# Patient Record
Sex: FEMALE | Race: BLACK OR AFRICAN AMERICAN | NOT HISPANIC OR LATINO | Employment: FULL TIME | URBAN - METROPOLITAN AREA
[De-identification: names, ages, dates, MRNs, and addresses within clinical notes are randomized per-mention and may not be internally consistent; named-entity substitution may affect disease eponyms.]

---

## 2022-10-06 ENCOUNTER — HOSPITAL ENCOUNTER (EMERGENCY)
Facility: HOSPITAL | Age: 52
Discharge: HOME | End: 2022-10-06
Attending: EMERGENCY MEDICINE
Payer: MEDICAID

## 2022-10-06 VITALS
BODY MASS INDEX: 21.71 KG/M2 | RESPIRATION RATE: 19 BRPM | WEIGHT: 115 LBS | DIASTOLIC BLOOD PRESSURE: 102 MMHG | TEMPERATURE: 98 F | HEIGHT: 61 IN | SYSTOLIC BLOOD PRESSURE: 190 MMHG | OXYGEN SATURATION: 100 % | HEART RATE: 64 BPM

## 2022-10-06 DIAGNOSIS — M54.9 BACK PAIN: ICD-10-CM

## 2022-10-06 PROCEDURE — 93010 ELECTROCARDIOGRAM REPORT: CPT | Mod: ,,, | Performed by: INTERNAL MEDICINE

## 2022-10-06 PROCEDURE — 63600175 PHARM REV CODE 636 W HCPCS: Performed by: EMERGENCY MEDICINE

## 2022-10-06 PROCEDURE — 93010 EKG 12-LEAD: ICD-10-PCS | Mod: ,,, | Performed by: INTERNAL MEDICINE

## 2022-10-06 PROCEDURE — 96372 THER/PROPH/DIAG INJ SC/IM: CPT | Performed by: EMERGENCY MEDICINE

## 2022-10-06 PROCEDURE — 99284 EMERGENCY DEPT VISIT MOD MDM: CPT | Mod: 25

## 2022-10-06 PROCEDURE — 25000003 PHARM REV CODE 250: Performed by: EMERGENCY MEDICINE

## 2022-10-06 PROCEDURE — 93005 ELECTROCARDIOGRAM TRACING: CPT

## 2022-10-06 RX ORDER — IBUPROFEN 600 MG/1
600 TABLET ORAL EVERY 6 HOURS PRN
Qty: 20 TABLET | Refills: 0 | OUTPATIENT
Start: 2022-10-06 | End: 2023-05-12

## 2022-10-06 RX ORDER — OXYCODONE AND ACETAMINOPHEN 7.5; 325 MG/1; MG/1
1 TABLET ORAL ONCE
Status: COMPLETED | OUTPATIENT
Start: 2022-10-06 | End: 2022-10-06

## 2022-10-06 RX ORDER — KETOROLAC TROMETHAMINE 30 MG/ML
30 INJECTION, SOLUTION INTRAMUSCULAR; INTRAVENOUS
Status: COMPLETED | OUTPATIENT
Start: 2022-10-06 | End: 2022-10-06

## 2022-10-06 RX ORDER — ASPIRIN/CAFFEINE 500-32.5MG
TABLET ORAL
COMMUNITY

## 2022-10-06 RX ORDER — ONDANSETRON 4 MG/1
4 TABLET, ORALLY DISINTEGRATING ORAL
Status: COMPLETED | OUTPATIENT
Start: 2022-10-06 | End: 2022-10-06

## 2022-10-06 RX ORDER — METHOCARBAMOL 750 MG/1
1500 TABLET, FILM COATED ORAL 3 TIMES DAILY
Qty: 30 TABLET | Refills: 0 | Status: SHIPPED | OUTPATIENT
Start: 2022-10-06 | End: 2022-10-11

## 2022-10-06 RX ORDER — LIDOCAINE 50 MG/G
1 PATCH TOPICAL ONCE
Status: COMPLETED | OUTPATIENT
Start: 2022-10-06 | End: 2022-10-06

## 2022-10-06 RX ADMIN — KETOROLAC TROMETHAMINE 30 MG: 30 INJECTION, SOLUTION INTRAMUSCULAR at 10:10

## 2022-10-06 RX ADMIN — LIDOCAINE 1 PATCH: 50 PATCH CUTANEOUS at 10:10

## 2022-10-06 RX ADMIN — ONDANSETRON 4 MG: 4 TABLET, ORALLY DISINTEGRATING ORAL at 10:10

## 2022-10-06 RX ADMIN — OXYCODONE AND ACETAMINOPHEN 1 TABLET: 7.5; 325 TABLET ORAL at 10:10

## 2022-10-06 NOTE — Clinical Note
"Rosette Bustamanteth"Israel was seen and treated in our emergency department on 10/6/2022.  She may return to work on 10/10/2022.       If you have any questions or concerns, please don't hesitate to call.      Blank Vallecillo RN RN    "

## 2022-10-06 NOTE — ED PROVIDER NOTES
"Encounter Date: 10/6/2022    SCRIBE #1 NOTE: I, Stephen Naik Jr, am scribing for, and in the presence of,  Artem Bautista MD. I have scribed the following portions of the note - Other sections scribed: HPI, ROS, PE, MDM.     History     Chief Complaint   Patient presents with    Back Pain     Back pain under left scapulae area that radiates down her left arm, +interminet tingling sensation. Patient noted pain started one week ago while at work lifting objects and noted a popping sesnation. +burning and stabbing pain in the back. No n/v, skin warm and dry. Hx of htn and did take bp medicaiton this am     Rosette Wood is a 51 y.o. female who has no past medical history on file.The patient presents to the emergency department due to back pain; onset one week. There are no associated symptoms.The patient reported developing left, upper-back pain that has worsened since onset; back pain described as "burning". Patient believes heavy lifting at work is the main reason for symptom development. She denies nausea and vomiting.  She has no chest pain or shortness of breath.  No fever or cough.  Patient has no known allergies to medication.     The history is provided by the patient. No  was used.   Review of patient's allergies indicates:  No Known Allergies  No past medical history on file.  No past surgical history on file.  No family history on file.     Review of Systems   Constitutional:  Negative for chills and fever.   Respiratory:  Negative for cough and shortness of breath.    Cardiovascular:  Negative for chest pain and leg swelling.   Gastrointestinal:  Negative for nausea and vomiting.   Musculoskeletal:  Positive for back pain.   Neurological:  Negative for dizziness.   All other systems reviewed and are negative.    Physical Exam     Initial Vitals [10/06/22 0914]   BP Pulse Resp Temp SpO2   (!) 201/104 90 20 98 °F (36.7 °C) 99 %      MAP       --         Physical Exam    Nursing " note and vitals reviewed.  Constitutional: No distress.   HENT:   Head: Normocephalic and atraumatic.   Eyes: Conjunctivae and EOM are normal.   Neck: Neck supple.   Normal range of motion.  Cardiovascular:  Normal rate, regular rhythm and normal heart sounds.           Pulmonary/Chest: Breath sounds normal.   Lungs clear.   Abdominal: Abdomen is soft. There is no abdominal tenderness.   Musculoskeletal:      Cervical back: Normal range of motion and neck supple.      Comments: Tenderness to the left, mid-thoracic paraspinal muscle that extends to the left scapula. Left upper-back pain exacerbated with movement of the left shoulder. No edema present.     Neurological: She is alert and oriented to person, place, and time.   Skin: Skin is warm and dry. No rash noted.   Psychiatric: Thought content normal.       ED Course   Procedures  Labs Reviewed - No data to display     ECG Results              EKG 12-lead (In process)  Result time 10/06/22 11:19:35      In process by Interface, Lab In [facility] (10/06/22 11:19:35)                   Narrative:    Test Reason : M54.9,    Vent. Rate : 088 BPM     Atrial Rate : 088 BPM     P-R Int : 144 ms          QRS Dur : 076 ms      QT Int : 360 ms       P-R-T Axes : 077 068 089 degrees     QTc Int : 435 ms    Normal sinus rhythm  Possible Left atrial enlargement  T wave abnormality, consider inferior ischemia  Abnormal ECG  No previous ECGs available    Referred By: [email or web address]   SELF           Confirmed By:                                   Imaging Results    None          Medications   LIDOcaine 5 % patch 1 patch (1 patch Transdermal Patch Applied 10/6/22 1009)   ketorolac injection 30 mg (30 mg Intramuscular Given 10/6/22 1009)   oxyCODONE-acetaminophen 7.5-325 mg per tablet 1 tablet (1 tablet Oral Given 10/6/22 1057)   ondansetron disintegrating tablet 4 mg (4 mg Oral Given 10/6/22 1057)     Medical Decision Making:   History:   Old Medical Records: I decided to obtain old  medical records.  Initial Assessment:   Rosette Wood is a 51 y.o. female who has no past medical history on file.The patient presents to the emergency department due to back pain; onset one week. There are no associated symptoms.    Differential Diagnosis:   Differential Diagnosis includes, but is not limited to:  Cauda equina syndrome, diskitis/osteomyelitis, epidural/paraspinal abscess, AAA, aortic dissection, post-op/hardware infection, trauma/vertebral fracture, spinal cord injury, disc herniation, spinal stenosis, sciatica, radiculopathy, neoplasm, lumbar muscle strain, muscle spasm, neuropathic pain, UTI/pyelonephritis, nephrolithiasis.   Clinical Tests:   Medical Tests: Ordered and Reviewed  ED Management:  Lidocaine patch was applied to the left upper back.  Patient was given an injection of Toradol as well as 1 Percocet.  Etiology of her pain seems to be muscular.  Patient reports a history of hypertension and did not take her medications this morning.  I have urged her to take these upon arrival home.  I will prescribe ibuprofen and Robaxin for her back pain.  She should follow up with her primary physician when able for recheck but also may return to the emergency department for any possible worsening.        Scribe Attestation:   Scribe #1: I performed the above scribed service and the documentation accurately describes the services I performed. I attest to the accuracy of the note.                 I, Dr. Artem Thakur, personally performed the services described in this documentation. All medical record entries made by the scribe were at my direction and in my presence. I have reviewed the chart and agree that the record reflects my personal performance and is accurate and complete. Artem Thakur MD.  6:34 AM 10/07/2022    Clinical Impression:   Final diagnoses:  [M54.9] Back pain        ED Disposition Condition    Discharge Stable          ED Prescriptions       Medication Sig Dispense Start Date  End Date Auth. Provider    ibuprofen (ADVIL,MOTRIN) 600 MG tablet Take 1 tablet (600 mg total) by mouth every 6 (six) hours as needed (pain). 20 tablet 10/6/2022 -- Artem Bautista MD    methocarbamoL (ROBAXIN) 750 MG Tab Take 2 tablets (1,500 mg total) by mouth 3 (three) times daily. for 5 days 30 tablet 10/6/2022 10/11/2022 Artem Bautista MD          Follow-up Information       Follow up With Specialties Details Why Contact Info    Your primary doctor   As needed     [place] - Emergency Dept Emergency Medicine  If symptoms worsen [address] [facility] [address] [phone]             Artem Bautista MD  10/07/22 0669

## 2022-10-06 NOTE — PHARMACY MED REC
"  [facility]           Pharmacy  Admission Medication History     The home medication history was taken by Unique Wolff.      Medication history obtained from Medications listed below were obtained from: Patient/family    Based on information gathered for medication list, you may go to "Admission" then "Reconcile Home Medications" tabs to review and/or act upon those items.     The home medication list has been updated by the Pharmacy department.   Please read ALL comments highlighted in yellow.   Please address this information as you see fit.    Feel free to contact us if you have any questions or require assistance.        No current facility-administered medications on file prior to encounter.     Current Outpatient Medications on File Prior to Encounter   Medication Sig Dispense Refill    aspirin-caffeine (DESTINEY BACK AND BODY) 500-32.5 mg Tab Take by mouth as needed.         Please address this information as you see fit.  Feel free to contact us if you have any questions or require assistance.    Unique Wolff  [phone]                .        "

## 2023-03-29 ENCOUNTER — HOSPITAL ENCOUNTER (EMERGENCY)
Facility: HOSPITAL | Age: 53
Discharge: HOME | End: 2023-03-29
Attending: EMERGENCY MEDICINE
Payer: MEDICAID

## 2023-03-29 VITALS
SYSTOLIC BLOOD PRESSURE: 174 MMHG | HEART RATE: 79 BPM | BODY MASS INDEX: 21.16 KG/M2 | RESPIRATION RATE: 18 BRPM | DIASTOLIC BLOOD PRESSURE: 84 MMHG | OXYGEN SATURATION: 100 % | WEIGHT: 112 LBS | TEMPERATURE: 98 F

## 2023-03-29 DIAGNOSIS — M25.551 RIGHT HIP PAIN: Primary | ICD-10-CM

## 2023-03-29 LAB
BILIRUB UR QL STRIP: NEGATIVE
CLARITY UR: CLEAR
COLOR UR: YELLOW
GLUCOSE UR QL STRIP: ABNORMAL
HGB UR QL STRIP: NEGATIVE
KETONES UR QL STRIP: NEGATIVE
LEUKOCYTE ESTERASE UR QL STRIP: NEGATIVE
NITRITE UR QL STRIP: NEGATIVE
PH UR STRIP: 7 [PH] (ref 5–8)
PROT UR QL STRIP: NEGATIVE
SP GR UR STRIP: 1.02 (ref 1–1.03)
URN SPEC COLLECT METH UR: ABNORMAL
UROBILINOGEN UR STRIP-ACNC: NEGATIVE EU/DL

## 2023-03-29 PROCEDURE — 96372 THER/PROPH/DIAG INJ SC/IM: CPT | Performed by: PHYSICIAN ASSISTANT

## 2023-03-29 PROCEDURE — 99284 EMERGENCY DEPT VISIT MOD MDM: CPT

## 2023-03-29 PROCEDURE — 63600175 PHARM REV CODE 636 W HCPCS: Performed by: PHYSICIAN ASSISTANT

## 2023-03-29 PROCEDURE — 81003 URINALYSIS AUTO W/O SCOPE: CPT | Performed by: PHYSICIAN ASSISTANT

## 2023-03-29 RX ORDER — LIDOCAINE 50 MG/G
1 PATCH TOPICAL DAILY
Qty: 10 PATCH | Refills: 0 | OUTPATIENT
Start: 2023-03-29 | End: 2023-12-11

## 2023-03-29 RX ORDER — KETOROLAC TROMETHAMINE 30 MG/ML
15 INJECTION, SOLUTION INTRAMUSCULAR; INTRAVENOUS
Status: COMPLETED | OUTPATIENT
Start: 2023-03-29 | End: 2023-03-29

## 2023-03-29 RX ORDER — NAPROXEN 500 MG/1
500 TABLET ORAL 2 TIMES DAILY WITH MEALS
Qty: 30 TABLET | Refills: 0 | OUTPATIENT
Start: 2023-03-29 | End: 2023-10-31

## 2023-03-29 RX ADMIN — KETOROLAC TROMETHAMINE 15 MG: 30 INJECTION, SOLUTION INTRAMUSCULAR; INTRAVENOUS at 09:03
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
## 2023-03-29 NOTE — DISCHARGE INSTRUCTIONS

## 2023-03-29 NOTE — Clinical Note
"Rosette Bustamanteth" Wood was seen and treated in our emergency department on 3/29/2023.  She may return to work on 03/30/2023.       If you have any questions or concerns, please don't hesitate to call.      Kieran Vernon PA-C"

## 2023-05-11 ENCOUNTER — HOSPITAL ENCOUNTER (EMERGENCY)
Facility: HOSPITAL | Age: 53
Discharge: HOME | End: 2023-05-12
Attending: STUDENT IN AN ORGANIZED HEALTH CARE EDUCATION/TRAINING PROGRAM
Payer: MEDICAID

## 2023-05-11 DIAGNOSIS — R07.9 CHEST PAIN: ICD-10-CM

## 2023-05-11 DIAGNOSIS — B34.9 VIRAL INFECTION: Primary | ICD-10-CM

## 2023-05-11 DIAGNOSIS — D64.9 ANEMIA, UNSPECIFIED TYPE: ICD-10-CM

## 2023-05-11 LAB
INFLUENZA A, MOLECULAR: NEGATIVE
INFLUENZA B, MOLECULAR: NEGATIVE
SARS-COV-2 RDRP RESP QL NAA+PROBE: NEGATIVE
SPECIMEN SOURCE: NORMAL

## 2023-05-11 PROCEDURE — 93005 ELECTROCARDIOGRAM TRACING: CPT

## 2023-05-11 PROCEDURE — 93010 ELECTROCARDIOGRAM REPORT: CPT | Mod: ,,, | Performed by: INTERNAL MEDICINE

## 2023-05-11 PROCEDURE — 83880 ASSAY OF NATRIURETIC PEPTIDE: CPT | Performed by: STUDENT IN AN ORGANIZED HEALTH CARE EDUCATION/TRAINING PROGRAM

## 2023-05-11 PROCEDURE — U0002 COVID-19 LAB TEST NON-CDC: HCPCS | Performed by: NURSE PRACTITIONER

## 2023-05-11 PROCEDURE — 83735 ASSAY OF MAGNESIUM: CPT | Performed by: STUDENT IN AN ORGANIZED HEALTH CARE EDUCATION/TRAINING PROGRAM

## 2023-05-11 PROCEDURE — 80053 COMPREHEN METABOLIC PANEL: CPT | Performed by: STUDENT IN AN ORGANIZED HEALTH CARE EDUCATION/TRAINING PROGRAM

## 2023-05-11 PROCEDURE — 93010 EKG 12-LEAD: ICD-10-PCS | Mod: ,,, | Performed by: INTERNAL MEDICINE

## 2023-05-11 PROCEDURE — 87502 INFLUENZA DNA AMP PROBE: CPT | Performed by: NURSE PRACTITIONER

## 2023-05-11 PROCEDURE — 84484 ASSAY OF TROPONIN QUANT: CPT | Performed by: STUDENT IN AN ORGANIZED HEALTH CARE EDUCATION/TRAINING PROGRAM

## 2023-05-11 PROCEDURE — 99285 EMERGENCY DEPT VISIT HI MDM: CPT | Mod: 25

## 2023-05-11 PROCEDURE — 85025 COMPLETE CBC W/AUTO DIFF WBC: CPT | Performed by: STUDENT IN AN ORGANIZED HEALTH CARE EDUCATION/TRAINING PROGRAM

## 2023-05-11 NOTE — Clinical Note
"Rosette Bustamanteth"Israel was seen and treated in our emergency department on 5/11/2023.  She may return to work on 05/13/2023.       If you have any questions or concerns, please don't hesitate to call.      Yury De Souza RN    "

## 2023-05-12 VITALS
DIASTOLIC BLOOD PRESSURE: 87 MMHG | TEMPERATURE: 98 F | HEART RATE: 72 BPM | SYSTOLIC BLOOD PRESSURE: 172 MMHG | OXYGEN SATURATION: 100 % | RESPIRATION RATE: 19 BRPM | HEIGHT: 61 IN | WEIGHT: 115 LBS | BODY MASS INDEX: 21.71 KG/M2

## 2023-05-12 LAB
ALBUMIN SERPL BCP-MCNC: 3.3 G/DL (ref 3.5–5.2)
ALP SERPL-CCNC: 78 U/L (ref 55–135)
ALT SERPL W/O P-5'-P-CCNC: 21 U/L (ref 10–44)
ANION GAP SERPL CALC-SCNC: 8 MMOL/L (ref 8–16)
AST SERPL-CCNC: 20 U/L (ref 10–40)
BASOPHILS # BLD AUTO: 0.11 K/UL (ref 0–0.2)
BASOPHILS NFR BLD: 1.6 % (ref 0–1.9)
BILIRUB SERPL-MCNC: 0.2 MG/DL (ref 0.1–1)
BNP SERPL-MCNC: 45 PG/ML (ref 0–99)
BUN SERPL-MCNC: 10 MG/DL (ref 6–20)
CALCIUM SERPL-MCNC: 8.4 MG/DL (ref 8.7–10.5)
CHLORIDE SERPL-SCNC: 107 MMOL/L (ref 95–110)
CO2 SERPL-SCNC: 26 MMOL/L (ref 23–29)
CREAT SERPL-MCNC: 0.7 MG/DL (ref 0.5–1.4)
DIFFERENTIAL METHOD: ABNORMAL
EOSINOPHIL # BLD AUTO: 0.3 K/UL (ref 0–0.5)
EOSINOPHIL NFR BLD: 3.9 % (ref 0–8)
ERYTHROCYTE [DISTWIDTH] IN BLOOD BY AUTOMATED COUNT: 21.1 % (ref 11.5–14.5)
EST. GFR  (NO RACE VARIABLE): >60 ML/MIN/1.73 M^2
GLUCOSE SERPL-MCNC: 101 MG/DL (ref 70–110)
HCT VFR BLD AUTO: 25.6 % (ref 37–48.5)
HGB BLD-MCNC: 7.1 G/DL (ref 12–16)
IMM GRANULOCYTES # BLD AUTO: 0.01 K/UL (ref 0–0.04)
IMM GRANULOCYTES NFR BLD AUTO: 0.1 % (ref 0–0.5)
LYMPHOCYTES # BLD AUTO: 1.9 K/UL (ref 1–4.8)
LYMPHOCYTES NFR BLD: 27.8 % (ref 18–48)
MAGNESIUM SERPL-MCNC: 1.9 MG/DL (ref 1.6–2.6)
MCH RBC QN AUTO: 16.1 PG (ref 27–31)
MCHC RBC AUTO-ENTMCNC: 27.7 G/DL (ref 32–36)
MCV RBC AUTO: 58 FL (ref 82–98)
MONOCYTES # BLD AUTO: 0.9 K/UL (ref 0.3–1)
MONOCYTES NFR BLD: 12.7 % (ref 4–15)
NEUTROPHILS # BLD AUTO: 3.6 K/UL (ref 1.8–7.7)
NEUTROPHILS NFR BLD: 53.9 % (ref 38–73)
NRBC BLD-RTO: 0 /100 WBC
PLATELET # BLD AUTO: 404 K/UL (ref 150–450)
PMV BLD AUTO: 9.4 FL (ref 9.2–12.9)
POTASSIUM SERPL-SCNC: 3.3 MMOL/L (ref 3.5–5.1)
PROT SERPL-MCNC: 6.8 G/DL (ref 6–8.4)
RBC # BLD AUTO: 4.42 M/UL (ref 4–5.4)
SODIUM SERPL-SCNC: 141 MMOL/L (ref 136–145)
TROPONIN I SERPL DL<=0.01 NG/ML-MCNC: 0.01 NG/ML (ref 0–0.03)
WBC # BLD AUTO: 6.68 K/UL (ref 3.9–12.7)

## 2023-05-12 PROCEDURE — 25000003 PHARM REV CODE 250: Performed by: STUDENT IN AN ORGANIZED HEALTH CARE EDUCATION/TRAINING PROGRAM

## 2023-05-12 RX ORDER — ACETAMINOPHEN 500 MG
500 TABLET ORAL EVERY 6 HOURS PRN
Qty: 20 TABLET | Refills: 0 | Status: SHIPPED | OUTPATIENT
Start: 2023-05-12

## 2023-05-12 RX ORDER — FERROUS SULFATE 325(65) MG
325 TABLET, DELAYED RELEASE (ENTERIC COATED) ORAL
Qty: 30 TABLET | Refills: 0 | Status: SHIPPED | OUTPATIENT
Start: 2023-05-12

## 2023-05-12 RX ADMIN — POTASSIUM BICARBONATE 25 MEQ: 978 TABLET, EFFERVESCENT ORAL at 12:05

## 2023-05-12 NOTE — DISCHARGE INSTRUCTIONS
Thank you for coming to our Emergency Department today. It is important to remember that some problems are difficult to diagnose and may not be found during your first visit. Be sure to follow up with your primary care doctor and review any labs/imaging that was performed with them. If you do not have a primary care doctor, you may contact the one listed on your discharge paperwork or you may also call the [facility] Appointment Desk at [phone] to schedule an appointment with one.     All medications may potentially have side effects and it is impossible to predict which medications may give you side effects. If you feel that you are having a negative effect of any medication you should immediately stop taking them and seek medical attention.    Return to the ER with any questions/concerns, new/concerning symptoms, worsening or failure to improve. Do not drive or make any important decisions for 24 hours if you have received any pain medications, sedatives or mood altering drugs during your ER visit.

## 2023-05-12 NOTE — ED PROVIDER NOTES
Encounter Date: 5/11/2023       History     Chief Complaint   Patient presents with    Headache    Cough    Fatigue    Chest Pain    Shortness of Breath    Chills     Headache, loss of appetite, SOB w/ exertion, cough that worsens at night, chills, fatigue and chest pain that worsens w/ coughing fits x 3 days. States boss diagnosed w/ flu recently. Taking Theraflu and warming Tylenol medicine at home without relief.     52 year old female with no significant past medical history presents with eyes fatigue, headache, cough, shortness of breath and chills that has been ongoing for the past 3 days.  Patient describes his headache as dull and diffuse in nature.  Denies any visual disturbances numbness or focal weakness.  Patient reports 3 days ago being exposed to her boss tested positive for the flu and was concerned she may have contracted the same.  She states taking Tylenol and TheraFlu without any significant improvement of her symptoms.  Chest pain described as diffuse and worsened with coughing episodes.  Cough at times productive of clear sputum.  Does reports smoking a pack every 3 days presents her onset of her symptoms has not has the desire to smoke.  Denies any abdominal pain nausea or vomiting.  Denies any lower extremity swelling.      Review of patient's allergies indicates:  No Known Allergies  No past medical history on file.  No past surgical history on file.  No family history on file.     Review of Systems   Constitutional:  Positive for chills and fatigue. Negative for fever.   HENT:  Negative for congestion and rhinorrhea.    Eyes:  Negative for pain.   Respiratory:  Positive for cough and shortness of breath.    Cardiovascular:  Positive for chest pain. Negative for leg swelling.   Gastrointestinal:  Negative for abdominal pain, nausea and vomiting.   Endocrine: Negative for polyuria.   Genitourinary:  Negative for dysuria and hematuria.   Musculoskeletal:  Negative for gait problem and neck  No protocol    Ok to fill?   Quantity? Refills?    Last filled: 12/22/22  Last seen: 9/22/22   pain.   Skin:  Negative for rash.   Allergic/Immunologic: Negative for immunocompromised state.   Neurological:  Positive for headaches. Negative for weakness.     Physical Exam     Initial Vitals [05/11/23 1935]   BP Pulse Resp Temp SpO2   (!) 191/102 99 20 98.1 °F (36.7 °C) 100 %      MAP       --         Physical Exam    Nursing note and vitals reviewed.  Constitutional: She appears well-developed and well-nourished. She is not diaphoretic. No distress.   HENT:   Head: Normocephalic and atraumatic.   Eyes: Conjunctivae and EOM are normal. Pupils are equal, round, and reactive to light.   Neck:   Normal range of motion.  Cardiovascular:  Regular rhythm.           Pulmonary/Chest: Breath sounds normal. No respiratory distress.   Abdominal: Abdomen is soft. Bowel sounds are normal. She exhibits no distension. There is no abdominal tenderness. There is no rebound and no guarding.   Musculoskeletal:         General: No tenderness. Normal range of motion.      Cervical back: Normal range of motion.     Lymphadenopathy:     She has no cervical adenopathy.   Neurological: She is alert and oriented to person, place, and time.   Moves all extremities and carries on conversation. CN- II: PERRL; III/IV/VI: EOMI w/out evidence of nystagmus; V: no deficits appreciated to light touch bilateral face; VII: no facial weakness, no facial asymmetry. Eyebrow raise symmetric. Smile symmetric; IX/X: palate midline, and raises symmetrically; XI: shoulder shrug 5/5 bilaterally; XII: tongue is midline w/out asymmetry. Strength 5/5 to bilateral upper and lower extremities, sensation intact to light touch,   Skin: Skin is warm. Capillary refill takes less than 2 seconds.   Psychiatric: She has a normal mood and affect. Her behavior is normal.       ED Course   Procedures  Labs Reviewed   CBC W/ AUTO DIFFERENTIAL - Abnormal; Notable for the following components:       Result Value    Hemoglobin 7.1 (*)     Hematocrit 25.6 (*)     MCV 58 (*)      MCH 16.1 (*)     MCHC 27.7 (*)     RDW 21.1 (*)     All other components within normal limits   COMPREHENSIVE METABOLIC PANEL - Abnormal; Notable for the following components:    Potassium 3.3 (*)     Calcium 8.4 (*)     Albumin 3.3 (*)     All other components within normal limits   INFLUENZA A & B BY MOLECULAR   SARS-COV-2 RNA AMPLIFICATION, QUAL   TROPONIN I   B-TYPE NATRIURETIC PEPTIDE   MAGNESIUM     EKG Readings: (Independently Interpreted)   Independent Interpretation of EKG:  Rhythm: Sinus   Rate: 84  QTC: 479, prolonged   No STEMI  Nonspecific ST and T-wave abnormalities.       Imaging Results              X-Ray Chest AP Portable (Final result)  Result time 05/12/23 00:21:13      Final result by Francisco Patrick MD (05/12/23 00:21:13)                   Impression:      No acute abnormality.      Electronically signed by: Francisco Patrick  Date:    05/12/2023  Time:    00:21               Narrative:    EXAMINATION:  XR CHEST AP PORTABLE    CLINICAL HISTORY:  Chest pain, unspecified    TECHNIQUE:  Single frontal view of the chest was performed.    COMPARISON:  None    FINDINGS:  The lungs are clear, with normal appearance of pulmonary vasculature and no pleural effusion or pneumothorax.    The cardiac silhouette is normal in size. The hilar and mediastinal contours are unremarkable.    Bones are intact.                                       Medications   potassium bicarbonate disintegrating tablet 25 mEq (25 mEq Oral Given 5/12/23 0044)     Medical Decision Making:   History:   Old Medical Records: I decided to obtain old medical records.  Initial Assessment:   This 52-year-old female patient presents with symptoms suspicious for likely viral upper respiratory infection. Differential includes bacterial pneumonia, sinusitis, allergic rhinitis. Do not suspect underlying cardiopulmonary process. I considered, but think unlikely, dangerous causes of this patient's symptoms to include ACS, CHF or COPD  exacerbations, pneumonia, pneumothorax. Patient is nontoxic appearing.  EKG with no STEMI.  Given patient's smoking history will obtain chest x-ray to assess for PNA. Will  obtain labs to assess for any acute cardiopulmonary pathology.  Currently patient with no focal neurological deficits.  No indication for CT imaging of the head.    Clinical Tests:   Lab Tests: Ordered and Reviewed  Radiological Study: Ordered and Reviewed  Medical Tests: Ordered and Reviewed           ED Course as of 05/12/23 0145   Fri May 12, 2023   0028 Hemoglobin(!): 7.1 [AS]   0033 Chem 14 negative for hypo-or hyper natremia, kalemia, chloridemia, or other electrolyte abnormalities; BUN and creatinine were within normal limits indicating normal kidney function, ALT and AST were within normal limits indicating normal liver function.  CBC notable for microcytic anemia with hemoglobin of 7.1.  Suspect possible iron deficiency anemia.  Discussed finding with patient and need to follow-up with primary care doctor.  Will discharge home with iron supplementation. [AS]   0035 The patient was reassessed and on subsequent re-evaluation, they were subjectively feeling better. They were resting comfortably and in no acute distress. I discussed the laboratory and diagnostic findings with the patient. Pt is currently stable for discharge. I see no indication of an emergent process beyond that addressed during our encounter but have duly counseled the patient/family regarding the need for prompt follow-up as well as the indications that should prompt immediate return to the emergency room should new or worrisome developments occur. The patient/family has been provided with verbal and printed direction regarding our final diagnosis(es) as well as instructions regarding use of OTC and/or Rx medications intended to manage the patient's aforementioned conditions. The patient/family verbalized an understanding. The patient/family is asked if there are any  questions or concerns. We discuss the case, until all issues are addressed to the patient/family's satisfaction. Patient/family understands and is agreeable to the plan.  [AS]      ED Course User Index  [AS] Chaya Fitzpatrick MD          DISCLAIMER: This note was prepared with [employer] voice recognition transcription software. Garbled syntax, mangled pronouns, and other bizarre constructions may be attributed to that software system.        Clinical Impression:   Final diagnoses:  [R07.9] Chest pain  [B34.9] Viral infection (Primary)  [D64.9] Anemia, unspecified type        ED Disposition Condition    Discharge Stable          ED Prescriptions       Medication Sig Dispense Start Date End Date Auth. Provider    ferrous sulfate 325 (65 FE) MG EC tablet Take 1 tablet (325 mg total) by mouth 3 (three) times daily with meals. 30 tablet 5/12/2023 -- Chaya Fitzpatrick MD    acetaminophen (TYLENOL) 500 MG tablet Take 1 tablet (500 mg total) by mouth every 6 (six) hours as needed for Pain. 20 tablet 5/12/2023 -- Chaya Fitzpatrick MD          Follow-up Information    None          Chaya Fitzpatrick MD  05/12/23 0145

## 2023-05-12 NOTE — FIRST PROVIDER EVALUATION
Emergency Department TeleTriage Encounter Note      CHIEF COMPLAINT    Chief Complaint   Patient presents with    Headache    Cough    Fatigue    Chest Pain    Shortness of Breath    Chills     Headache, loss of appetite, SOB w/ exertion, cough that worsens at night, chills, fatigue and chest pain that worsens w/ coughing fits x 3 days. States boss diagnosed w/ flu recently. Taking Theraflu and warming Tylenol medicine at home without relief.       VITAL SIGNS   Initial Vitals [05/11/23 1935]   BP Pulse Resp Temp SpO2   (!) 191/102 99 20 98.1 °F (36.7 °C) 100 %      MAP       --            ALLERGIES    Review of patient's allergies indicates:  No Known Allergies    PROVIDER TRIAGE NOTE  TeleTriage Note: Rosette Wood, a nontoxic/well appearing, 52 y.o. female, presented to the ED with c/o chest pain from coughing at night with associated fatigue. The smell of food is making her nauseous. Recently exposed to the flu at work.     All ED beds are full at present; patient notified of this status.  Patient seen and medically screened by Nurse Practitioner via teletriage. Orders initiated at triage to expedite care.  Patient is stable to return to the waiting room and will be placed in an ED bed when available.  Care will be transferred to an alternate provider when patient has been placed in an Exam Room from the [facility] for physical exam, additional orders, and disposition.  7:45 PM Ligia Kingston DNP, FNP-C        ORDERS  Labs Reviewed   INFLUENZA A & B BY MOLECULAR   SARS-COV-2 RNA AMPLIFICATION, QUAL       ED Orders (720h ago, onward)      Start Ordered     Status Ordering Provider    05/11/23 1946 05/11/23 1946  Influenza A & B by Molecular  STAT         Ordered LIGIA KINGSTON    05/11/23 1946 05/11/23 1946  COVID-19 Rapid Screening  STAT         Ordered LIGIA KINGSTON              Virtual Visit Note: The provider triage portion of this emergency department evaluation and documentation was performed via  [employer], a HIPAA-compliant telemedicine application, in concert with a tele-presenter in the room. A face to face patient evaluation with one of my colleagues will occur once the patient is placed in an emergency department room.      DISCLAIMER: This note was prepared with [employer] voice recognition transcription software. Garbled syntax, mangled pronouns, and other bizarre constructions may be attributed to that software system.

## 2023-08-13 ENCOUNTER — HOSPITAL ENCOUNTER (EMERGENCY)
Facility: HOSPITAL | Age: 53
Discharge: HOME | End: 2023-08-14
Attending: EMERGENCY MEDICINE
Payer: MEDICAID

## 2023-08-13 VITALS
SYSTOLIC BLOOD PRESSURE: 142 MMHG | DIASTOLIC BLOOD PRESSURE: 85 MMHG | BODY MASS INDEX: 21.73 KG/M2 | HEART RATE: 77 BPM | RESPIRATION RATE: 18 BRPM | TEMPERATURE: 99 F | OXYGEN SATURATION: 99 % | WEIGHT: 115 LBS

## 2023-08-13 DIAGNOSIS — S83.91XA SPRAIN OF RIGHT KNEE, UNSPECIFIED LIGAMENT, INITIAL ENCOUNTER: Primary | ICD-10-CM

## 2023-08-13 DIAGNOSIS — M25.562 LEFT KNEE PAIN: ICD-10-CM

## 2023-08-13 PROCEDURE — 99283 EMERGENCY DEPT VISIT LOW MDM: CPT

## 2023-08-13 RX ORDER — IBUPROFEN 600 MG/1
600 TABLET ORAL
Status: COMPLETED | OUTPATIENT
Start: 2023-08-14 | End: 2023-08-14

## 2023-08-14 PROCEDURE — 25000003 PHARM REV CODE 250: Performed by: EMERGENCY MEDICINE

## 2023-08-14 RX ORDER — IBUPROFEN 600 MG/1
600 TABLET ORAL EVERY 6 HOURS PRN
Qty: 40 TABLET | Refills: 0 | OUTPATIENT
Start: 2023-08-14 | End: 2023-12-11

## 2023-08-14 RX ADMIN — IBUPROFEN 600 MG: 600 TABLET, FILM COATED ORAL at 12:08

## 2023-08-14 NOTE — DISCHARGE INSTRUCTIONS
Recommend ibuprofen as needed for pain control.  If pain persists beyond 1 week recommend re-evaluation with primary care provider further management and possible further imaging.

## 2023-08-14 NOTE — ED PROVIDER NOTES
Encounter Date: 8/13/2023       History     Chief Complaint   Patient presents with    Ankle Pain    Knee Pain     Patient states she was walking to work yesterday morning and she fell into a hole and went forward onto the ground. She is complaining of right knee and right lateral ankle pain. She denies head injury or loss of consciousness. There is no obvious trauma or swelling to the ankle or knee.      52-year-old female presents with right knee and right ankle pain following ground level fall.  Incident occurred yesterday.  Patient reports while walking she tripped causing her to twist her right leg.  Patient reports striking her knee on the ground.  Patient reports pain from the knee to the ankle.  Patient is ambulatory.  No analgesia taken prior to arrival.  Reports the pain as constant worse with movement and palpation.      Review of patient's allergies indicates:  No Known Allergies  No past medical history on file.  No past surgical history on file.  No family history on file.     Review of Systems   Constitutional:  Negative for chills and fever.   Respiratory:  Negative for shortness of breath.    Cardiovascular:  Negative for chest pain.   Genitourinary:  Negative for dysuria.   Musculoskeletal:  Negative for back pain.        Right knee pain.   Skin:  Negative for rash and wound.   Neurological:  Negative for weakness, numbness and headaches.       Physical Exam     Initial Vitals [08/13/23 2102]   BP Pulse Resp Temp SpO2   131/80 98 18 98.8 °F (37.1 °C) 99 %      MAP       --         Physical Exam    Nursing note and vitals reviewed.  Constitutional: She appears well-developed and well-nourished. She does not appear ill. No distress.   HENT:   Head: Normocephalic and atraumatic.   Mouth/Throat: Oropharynx is clear and moist.   Eyes: Conjunctivae and EOM are normal.   Neck:   Normal range of motion.  Cardiovascular:  Regular rhythm and normal heart sounds.   Tachycardia present.         No murmur  heard.  Pulmonary/Chest: Breath sounds normal. No respiratory distress. She has no wheezes.   Abdominal: Abdomen is soft. There is no abdominal tenderness.   Musculoskeletal:         General: No edema.      Cervical back: Normal range of motion.      Comments: No deformity to the right lower extremity.  Reported tenderness over the patella.  No tenderness over the medial or lateral aspect of the knee.  No ankle tenderness.  No edema to the ankle.  Full range of motion right ankle and foot.     Neurological: She is alert. GCS score is 15.   Skin: Skin is warm.   No abrasion noted to the right leg.   Psychiatric: She has a normal mood and affect.         ED Course   Procedures  Labs Reviewed - No data to display       Imaging Results              X-Ray Knee 1 or 2 View Left (Final result)  Result time 08/14/23 01:04:54      Final result by Ernesto Ramirez DO (08/14/23 01:04:54)                   Impression:      No acute osseous abnormality.      Electronically signed by: Ernesto Ramirez  Date:    08/14/2023  Time:    01:04               Narrative:    EXAMINATION:  XR KNEE 1 OR 2 VIEW LEFT    CLINICAL HISTORY:  Pain in left knee    TECHNIQUE:  One or two views of the left knee were performed.    COMPARISON:  None    FINDINGS:  There is no acute fracture or dislocation. Alignment is normal. Joint spaces are preserved. No joint effusion.                                       Medications   ibuprofen tablet 600 mg (600 mg Oral Given 8/14/23 0008)     Medical Decision Making:   Initial Assessment:   52-year-old female presenting following ground level fall.  Patient appears to have twisted her right knee.  No open wounds noted.  Patient has been ambulatory and is ambulatory in the emergency room.  Discussed using NSAIDs as needed for pain control.  X-ray shows no fracture.  Low suspicion for ankle injury based on Zahl ankle formula.  Differential Diagnosis:   Fracture, contusion, sprain  Clinical Tests:   Radiological  Study: Ordered and Reviewed                          Clinical Impression:   Final diagnoses:  [M25.562] Left knee pain  [S83.91XA] Sprain of right knee, unspecified ligament, initial encounter (Primary)        ED Disposition Condition    Discharge Stable          ED Prescriptions       Medication Sig Dispense Start Date End Date Auth. Provider    ibuprofen (ADVIL,MOTRIN) 600 MG tablet Take 1 tablet (600 mg total) by mouth every 6 (six) hours as needed for Pain. 40 tablet 8/14/2023 -- Sam Morrow MD          Follow-up Information       Follow up With Specialties Details Why Contact Info [facility]  Schedule an appointment as soon as possible for a visit in 3 days Primary care [address] [facility] [id number]    [place] - Emergency Dept Emergency Medicine  If symptoms worsen [address], [place] [address] [phone]             Sam Morrow MD  08/14/23 0489

## 2023-08-14 NOTE — ED NOTES
Patient reports falling to the ground from a standing position on Saturday morning.  Patient states it was dark and she may have stepped in a pot hole.  Pain present to right lateral knee region, radiating down to right ankle.  Pain increases with movement and ambulation.  Minimal swelling present to lateral knee.  No obvious deformity.  CSM with cap refill < 3 seconds present to distal of RLE.  No redness.  No heat.     Pain:  Rated 8/10.     Psychosocial:  Patient is calm and cooperative.  Patients insight and judgement are appropriate to situation.  Appears clean, well maintained, with clothing appropriate to environment.  No evidence of delusions, hallucinations, or psychosis.     Neuro:  Eyes open spontaneously.  Awake, alert, oriented x 4.  Speech clear and appropriate.  Tolerating saliva secretions well.  Able to follow commands, demonstrating ability to actively and appropriately communicate within context of current conversation.  Symmetrical facial muscles.  Moving all extremities well with no noted weakness.  Adequate muscle tone present.    Movement is purposeful.        Airway:  Bilateral chest rise and fall.  RR regular and non-labored.        Circulatory:  Skin warm, dry, and pink.       Extremities:  See above notes.     Skin:  Intact.

## 2023-10-31 ENCOUNTER — HOSPITAL ENCOUNTER (EMERGENCY)
Facility: HOSPITAL | Age: 53
Discharge: HOME | End: 2023-10-31
Attending: STUDENT IN AN ORGANIZED HEALTH CARE EDUCATION/TRAINING PROGRAM
Payer: MEDICAID

## 2023-10-31 VITALS
TEMPERATURE: 98 F | HEIGHT: 62 IN | BODY MASS INDEX: 22.08 KG/M2 | OXYGEN SATURATION: 99 % | RESPIRATION RATE: 16 BRPM | SYSTOLIC BLOOD PRESSURE: 176 MMHG | WEIGHT: 120 LBS | HEART RATE: 96 BPM | DIASTOLIC BLOOD PRESSURE: 98 MMHG

## 2023-10-31 DIAGNOSIS — M79.641 RIGHT HAND PAIN: Primary | ICD-10-CM

## 2023-10-31 PROCEDURE — 25000003 PHARM REV CODE 250: Performed by: STUDENT IN AN ORGANIZED HEALTH CARE EDUCATION/TRAINING PROGRAM

## 2023-10-31 PROCEDURE — 99284 EMERGENCY DEPT VISIT MOD MDM: CPT

## 2023-10-31 RX ORDER — NAPROXEN 500 MG/1
500 TABLET ORAL 2 TIMES DAILY WITH MEALS
Qty: 60 TABLET | Refills: 0 | OUTPATIENT
Start: 2023-10-31 | End: 2024-02-08

## 2023-10-31 RX ORDER — CEPHALEXIN 500 MG/1
500 CAPSULE ORAL
Status: COMPLETED | OUTPATIENT
Start: 2023-10-31 | End: 2023-10-31

## 2023-10-31 RX ORDER — OXYCODONE HYDROCHLORIDE 5 MG/1
5 TABLET ORAL
Status: COMPLETED | OUTPATIENT
Start: 2023-10-31 | End: 2023-10-31

## 2023-10-31 RX ORDER — NAPROXEN 500 MG/1
500 TABLET ORAL
Status: COMPLETED | OUTPATIENT
Start: 2023-10-31 | End: 2023-10-31

## 2023-10-31 RX ORDER — CEPHALEXIN 500 MG/1
500 CAPSULE ORAL 4 TIMES DAILY
Qty: 20 CAPSULE | Refills: 0 | Status: SHIPPED | OUTPATIENT
Start: 2023-10-31 | End: 2023-11-05

## 2023-10-31 RX ADMIN — CEPHALEXIN 500 MG: 500 CAPSULE ORAL at 09:10

## 2023-10-31 RX ADMIN — NAPROXEN 500 MG: 500 TABLET ORAL at 09:10

## 2023-10-31 RX ADMIN — OXYCODONE HYDROCHLORIDE 5 MG: 5 TABLET ORAL at 09:10

## 2023-10-31 NOTE — DISCHARGE INSTRUCTIONS

## 2023-10-31 NOTE — ED NOTES
"Presents to ER with c/o right hand pain and swelling states having a hx of carpule tunnel in the past although has not had issues for "years". Swelling is noted to right head and is warm to tough. Radial pulse is WNL. States using right hand often due to work with increased movement of right wrist. Decreased ROM noted to right wrist and hand. Pt been to xray prior to this RN arriving at [facility]. Updated pt on progress of results.   "

## 2023-10-31 NOTE — Clinical Note
"Rosette MILLANTH" Israel was seen and treated in our emergency department on 10/31/2023.  She may return to work on 11/03/2023.       If you have any questions or concerns, please don't hesitate to call.      Watson Curtis MD"

## 2023-10-31 NOTE — ED PROVIDER NOTES
Encounter Date: 10/31/2023       History     Chief Complaint   Patient presents with    Hand Injury     Pain and swelling to the right hand. Pain started while at work yesterday around 0630. Denies recent injury.      52 year old female who presents with right hand pain for the last day. 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 She says the pain worsened to the point brushing her teeth was difficult this morning. No nausea, vomiting or fevers. No trauma, IVDA, ESRD or other instrumentation. No fevers. No redness. No prior history of an arthropathy, e.g. crystalline/RA.       Review of patient's allergies indicates:  No Known Allergies  No past medical history on file.  No past surgical history on file.  No family history on file.     Review of Systems    Physical Exam     Initial Vitals [10/31/23 0800]   BP Pulse Resp Temp SpO2   (!) 176/98 96 16 98.4 °F (36.9 °C) 99 %      MAP       --         Physical Exam    Nursing note and vitals reviewed.  Constitutional: She appears well-developed and well-nourished.   HENT:   Head: Normocephalic and atraumatic.   Eyes: EOM are normal. Pupils are equal, round, and reactive to light.   Neck: Neck supple. No crepitus.   Normal range of motion.  Cardiovascular:  Normal rate, regular rhythm, normal heart sounds and intact distal pulses.     Exam reveals no S3.       No murmur heard.  Pulmonary/Chest: Breath sounds normal. No respiratory distress.   Abdominal: Abdomen is soft. Bowel sounds are normal. She exhibits no pulsatile midline mass.   Musculoskeletal:         General: Tenderness and edema present.      Cervical back: Normal, normal range of motion and neck supple. No deformity, tenderness, bony tenderness or crepitus.      Thoracic back: Normal. No deformity, tenderness or bony tenderness.      Lumbar back: Normal. No deformity, tenderness or bony tenderness. Negative right straight leg raise test and negative left straight  leg raise test.      Comments: R hand: no obvious deformity. Hand is globally mildly swollen when compared to L. Tenderness appreciated at the dorsal wrist and towards the dorsal thumb. Able to make fist but it is painful. ROM limited 2/2 pain. No redness. No fluctuant areas.     Neurological: She is alert and oriented to person, place, and time. She has normal strength and normal reflexes. She displays normal reflexes. GCS score is 15. GCS eye subscore is 4. GCS verbal subscore is 5. GCS motor subscore is 6.   Skin: Skin is warm and dry. Capillary refill takes less than 2 seconds.   Psychiatric: She has a normal mood and affect. Thought content normal.         ED Course   Procedures  Labs Reviewed - No data to display       Imaging Results              X-Ray Hand 3 view Right (Final result)  Result time 10/31/23 08:43:14      Final result by Nelly Katz MD (10/31/23 08:43:14)                   Impression:      Age-indeterminate fracture of the distal 5th metacarpal head with mild volar angulation.      Electronically signed by: Nelly Katz MD  Date:    10/31/2023  Time:    08:43               Narrative:    EXAMINATION:  XR HAND COMPLETE 3 VIEW RIGHT    CLINICAL HISTORY:  injury;    TECHNIQUE:  PA, lateral, and oblique views of the right hand were performed.    COMPARISON:  None    FINDINGS:  The alignment and mineralization is normal.    Mild volar deformity at the distal 5th metacarpal bone suggestive of a subacute or chronic fracture.    No significant degenerative change.    The radiocarpal joints and carpal bones appear normal.    The soft tissues appear normal.                                       Medications   oxyCODONE immediate release tablet 5 mg (has no administration in time range)   naproxen tablet 500 mg (has no administration in time range)   cephALEXin capsule 500 mg (has no administration in time range)     Medical Decision Making  Hemodynamically stable. Afebrile. Phonating and  protecting the airway spontaneously. No clinical evidence for cardiovascular instability or impending airway compromise. Examination as above. Prior medical records reviewed.    Plan:  Unclear presentation. Lack of fever, redness, warmth, trauma, or instrumentation/etiology for hematogenous spread - do not clinically suspect pyogenic arthritis. Possible crystalline arthropathy however will forgo wrist tap due to minimal swelling and doubtful for a good pocket. XR reviewed - fx is old from prior boxers per pt.     Will empirically with oxycodone acutely, naproxen and keflex. Will refer to hand surgery. Very strict return precautions discussed including fever, worsening pain, redness, etc.       Amount and/or Complexity of Data Reviewed  Radiology: ordered.                               Clinical Impression:   Final diagnoses:  [M79.641] Right hand pain (Primary)        ED Disposition Condition    Discharge Stable          ED Prescriptions       Medication Sig Dispense Start Date End Date Auth. Provider    cephALEXin (KEFLEX) 500 MG capsule Take 1 capsule (500 mg total) by mouth 4 (four) times daily. for 5 days 20 capsule 10/31/2023 11/5/2023 Watson Curtis MD    naproxen (NAPROSYN) 500 MG tablet Take 1 tablet (500 mg total) by mouth 2 (two) times daily with meals. 60 tablet 10/31/2023 -- Watson Curtis MD          Follow-up Information       Follow up With Specialties Details Why Contact [facility] - Emergency Dept Emergency Medicine Go to  As needed [address] [facility] [address] [phone]             Watson Curtis MD  10/31/23 7148

## 2023-12-11 ENCOUNTER — HOSPITAL ENCOUNTER (EMERGENCY)
Facility: HOSPITAL | Age: 53
Discharge: HOME | End: 2023-12-11
Attending: EMERGENCY MEDICINE
Payer: MEDICAID

## 2023-12-11 VITALS
DIASTOLIC BLOOD PRESSURE: 96 MMHG | TEMPERATURE: 99 F | SYSTOLIC BLOOD PRESSURE: 194 MMHG | BODY MASS INDEX: 21.95 KG/M2 | OXYGEN SATURATION: 98 % | HEART RATE: 67 BPM | WEIGHT: 120 LBS | RESPIRATION RATE: 18 BRPM

## 2023-12-11 DIAGNOSIS — M54.9 BACK PAIN, UNSPECIFIED BACK LOCATION, UNSPECIFIED BACK PAIN LATERALITY, UNSPECIFIED CHRONICITY: Primary | ICD-10-CM

## 2023-12-11 DIAGNOSIS — R10.9 FLANK PAIN: ICD-10-CM

## 2023-12-11 LAB
ALBUMIN SERPL BCP-MCNC: 3.5 G/DL (ref 3.5–5.2)
ALP SERPL-CCNC: 75 U/L (ref 55–135)
ALT SERPL W/O P-5'-P-CCNC: 18 U/L (ref 10–44)
ANION GAP SERPL CALC-SCNC: 7 MMOL/L (ref 8–16)
AST SERPL-CCNC: 18 U/L (ref 10–40)
B-HCG UR QL: NEGATIVE
BASOPHILS # BLD AUTO: 0.2 K/UL (ref 0–0.2)
BASOPHILS NFR BLD: 2.1 % (ref 0–1.9)
BILIRUB SERPL-MCNC: 0.2 MG/DL (ref 0.1–1)
BILIRUB UR QL STRIP: NEGATIVE
BUN SERPL-MCNC: 10 MG/DL (ref 6–20)
CALCIUM SERPL-MCNC: 9 MG/DL (ref 8.7–10.5)
CHLORIDE SERPL-SCNC: 107 MMOL/L (ref 95–110)
CLARITY UR: CLEAR
CO2 SERPL-SCNC: 26 MMOL/L (ref 23–29)
COLOR UR: YELLOW
CREAT SERPL-MCNC: 0.8 MG/DL (ref 0.5–1.4)
CTP QC/QA: YES
DIFFERENTIAL METHOD: ABNORMAL
EOSINOPHIL # BLD AUTO: 0.4 K/UL (ref 0–0.5)
EOSINOPHIL NFR BLD: 4 % (ref 0–8)
ERYTHROCYTE [DISTWIDTH] IN BLOOD BY AUTOMATED COUNT: 21.5 % (ref 11.5–14.5)
EST. GFR  (NO RACE VARIABLE): >60 ML/MIN/1.73 M^2
GLUCOSE SERPL-MCNC: 115 MG/DL (ref 70–110)
GLUCOSE UR QL STRIP: NEGATIVE
HCT VFR BLD AUTO: 30 % (ref 37–48.5)
HGB BLD-MCNC: 9.3 G/DL (ref 12–16)
HGB UR QL STRIP: NEGATIVE
IMM GRANULOCYTES # BLD AUTO: 0.03 K/UL (ref 0–0.04)
IMM GRANULOCYTES NFR BLD AUTO: 0.3 % (ref 0–0.5)
KETONES UR QL STRIP: NEGATIVE
LEUKOCYTE ESTERASE UR QL STRIP: NEGATIVE
LYMPHOCYTES # BLD AUTO: 2.5 K/UL (ref 1–4.8)
LYMPHOCYTES NFR BLD: 26.5 % (ref 18–48)
MCH RBC QN AUTO: 19.7 PG (ref 27–31)
MCHC RBC AUTO-ENTMCNC: 31 G/DL (ref 32–36)
MCV RBC AUTO: 63 FL (ref 82–98)
MONOCYTES # BLD AUTO: 1 K/UL (ref 0.3–1)
MONOCYTES NFR BLD: 10.9 % (ref 4–15)
NEUTROPHILS # BLD AUTO: 5.3 K/UL (ref 1.8–7.7)
NEUTROPHILS NFR BLD: 56.2 % (ref 38–73)
NITRITE UR QL STRIP: NEGATIVE
NRBC BLD-RTO: 0 /100 WBC
PH UR STRIP: 7 [PH] (ref 5–8)
PLATELET # BLD AUTO: 491 K/UL (ref 150–450)
PMV BLD AUTO: 10.1 FL (ref 9.2–12.9)
POTASSIUM SERPL-SCNC: 3.7 MMOL/L (ref 3.5–5.1)
PROT SERPL-MCNC: 7.4 G/DL (ref 6–8.4)
PROT UR QL STRIP: NEGATIVE
RBC # BLD AUTO: 4.73 M/UL (ref 4–5.4)
SODIUM SERPL-SCNC: 140 MMOL/L (ref 136–145)
SP GR UR STRIP: 1.01 (ref 1–1.03)
URN SPEC COLLECT METH UR: NORMAL
UROBILINOGEN UR STRIP-ACNC: NEGATIVE EU/DL
WBC # BLD AUTO: 9.43 K/UL (ref 3.9–12.7)

## 2023-12-11 PROCEDURE — 96374 THER/PROPH/DIAG INJ IV PUSH: CPT

## 2023-12-11 PROCEDURE — 99285 EMERGENCY DEPT VISIT HI MDM: CPT | Mod: 25

## 2023-12-11 PROCEDURE — 80053 COMPREHEN METABOLIC PANEL: CPT | Performed by: NURSE PRACTITIONER

## 2023-12-11 PROCEDURE — 63600175 PHARM REV CODE 636 W HCPCS: Performed by: NURSE PRACTITIONER

## 2023-12-11 PROCEDURE — 81003 URINALYSIS AUTO W/O SCOPE: CPT | Performed by: NURSE PRACTITIONER

## 2023-12-11 PROCEDURE — 25000003 PHARM REV CODE 250: Performed by: NURSE PRACTITIONER

## 2023-12-11 PROCEDURE — 81025 URINE PREGNANCY TEST: CPT | Performed by: NURSE PRACTITIONER

## 2023-12-11 PROCEDURE — 85025 COMPLETE CBC W/AUTO DIFF WBC: CPT | Performed by: NURSE PRACTITIONER

## 2023-12-11 RX ORDER — MORPHINE SULFATE 4 MG/ML
4 INJECTION, SOLUTION INTRAMUSCULAR; INTRAVENOUS
Status: COMPLETED | OUTPATIENT
Start: 2023-12-11 | End: 2023-12-11

## 2023-12-11 RX ORDER — MORPHINE SULFATE 4 MG/ML
4 INJECTION, SOLUTION INTRAMUSCULAR; INTRAVENOUS
Status: DISCONTINUED | OUTPATIENT
Start: 2023-12-11 | End: 2023-12-11

## 2023-12-11 RX ORDER — LIDOCAINE 50 MG/G
1 PATCH TOPICAL
Status: DISCONTINUED | OUTPATIENT
Start: 2023-12-11 | End: 2023-12-11 | Stop reason: HOSPADM

## 2023-12-11 RX ORDER — LIDOCAINE 50 MG/G
1 PATCH TOPICAL DAILY
Qty: 7 PATCH | Refills: 0 | Status: SHIPPED | OUTPATIENT
Start: 2023-12-11 | End: 2023-12-18

## 2023-12-11 RX ORDER — KETOROLAC TROMETHAMINE 10 MG/1
10 TABLET, FILM COATED ORAL
Status: COMPLETED | OUTPATIENT
Start: 2023-12-11 | End: 2023-12-11

## 2023-12-11 RX ORDER — METHOCARBAMOL 750 MG/1
750 TABLET, FILM COATED ORAL
Status: COMPLETED | OUTPATIENT
Start: 2023-12-11 | End: 2023-12-11

## 2023-12-11 RX ORDER — IBUPROFEN 800 MG/1
800 TABLET ORAL EVERY 6 HOURS PRN
Qty: 20 TABLET | Refills: 0 | Status: SHIPPED | OUTPATIENT
Start: 2023-12-11

## 2023-12-11 RX ORDER — METHOCARBAMOL 500 MG/1
500 TABLET, FILM COATED ORAL 3 TIMES DAILY
Qty: 15 TABLET | Refills: 0 | Status: SHIPPED | OUTPATIENT
Start: 2023-12-11 | End: 2023-12-16

## 2023-12-11 RX ADMIN — KETOROLAC TROMETHAMINE 10 MG: 10 TABLET, FILM COATED ORAL at 03:12

## 2023-12-11 RX ADMIN — METHOCARBAMOL TABLETS 750 MG: 750 TABLET, COATED ORAL at 03:12

## 2023-12-11 RX ADMIN — LIDOCAINE 1 PATCH: 50 PATCH CUTANEOUS at 03:12

## 2023-12-11 RX ADMIN — MORPHINE SULFATE 4 MG: 4 INJECTION INTRAVENOUS at 04:12

## 2023-12-11 NOTE — Clinical Note
"Rosette MILLANTH"Israel was seen and treated in our emergency department on 12/11/2023.  She may return to work on 12/14/2023.       If you have any questions or concerns, please don't hesitate to call.      Tammy KENNEY"

## 2023-12-11 NOTE — ED TRIAGE NOTES
Left lower back pain since Saturday. States pain worse with movement and unrelieved with Naproxen and Percocet. Denies N/V, urinary symptoms, fever. Presents in no distress.

## 2023-12-11 NOTE — Clinical Note
"Rosette MILLANTH" Wood was seen and treated in our emergency department on 12/11/2023.  She may return to work on 12/13/2023.       If you have any questions or concerns, please don't hesitate to call.      Roopa Alston NP"

## 2023-12-11 NOTE — ED PROVIDER NOTES
Encounter Date: 12/11/2023       History     Chief Complaint   Patient presents with    Back Pain     Pt bent over to pick something up at work on Saturday and has had left lower back pain since then. Pt heard a pop. Pt denies dysuria as well as incontinence. Pt has a steady gait but slow to sit and stand. Pt tried naproxen and percocet.      53 yr old female presents to the ER with reports of lower back pain. No bowel or bladder loss. No numbness or tingling. No urinary symptoms. No PMH reported. Took Aleve with no improvement in condition. Denies injury. Pt states she took otc meds with no improvement.     The history is provided by the patient. No  was used.     Review of patient's allergies indicates:  No Known Allergies  No past medical history on file.  No past surgical history on file.  No family history on file.     Review of Systems   Musculoskeletal:  Positive for back pain.   All other systems reviewed and are negative.      Physical Exam     Initial Vitals [12/11/23 1347]   BP Pulse Resp Temp SpO2   (!) 195/105 90 18 98.6 °F (37 °C) 100 %      MAP       --         Physical Exam    Constitutional: She appears well-developed and well-nourished.   HENT:   Head: Normocephalic.   Right Ear: Hearing and tympanic membrane normal.   Left Ear: Hearing and tympanic membrane normal.   Nose: Nose normal.   Mouth/Throat: Oropharynx is clear and moist.   Eyes: Lids are normal. Pupils are equal, round, and reactive to light.   Neck:   Normal range of motion.  Cardiovascular:  Normal rate.           Pulmonary/Chest: Breath sounds normal. No respiratory distress. She has no wheezes. She has no rhonchi.   Abdominal: Abdomen is soft. There is no abdominal tenderness.   There is right CVA tenderness.  Musculoskeletal:         General: Normal range of motion.      Cervical back: Normal range of motion. No rigidity.      Lumbar back: Negative right straight leg raise test and negative left straight leg  raise test.      Comments: Mild lumbar paraspinal tenderness, no step offs. NO edema or erythema noted.      Neurological: She is alert and oriented to person, place, and time.   Skin: Skin is warm and dry. No rash noted.   Psychiatric: She has a normal mood and affect. Her behavior is normal. Judgment and thought content normal.         ED Course   Procedures  Labs Reviewed   CBC W/ AUTO DIFFERENTIAL - Abnormal; Notable for the following components:       Result Value    Hemoglobin 9.3 (*)     Hematocrit 30.0 (*)     MCV 63 (*)     MCH 19.7 (*)     MCHC 31.0 (*)     RDW 21.5 (*)     Platelets 491 (*)     Basophil % 2.1 (*)     All other components within normal limits   COMPREHENSIVE METABOLIC PANEL - Abnormal; Notable for the following components:    Glucose 115 (*)     Anion Gap 7 (*)     All other components within normal limits   URINALYSIS, REFLEX TO URINE CULTURE    Narrative:     Specimen Source->Urine   POCT URINE PREGNANCY          Imaging Results              CT Renal Stone Study ABD Pelvis WO (Final result)  Result time 12/11/23 17:46:16      Final result by Francisco Patrick MD (12/11/23 17:46:16)                   Impression:      No evidence of  tract stone or obstruction.      Electronically signed by: Francisco Patrick  Date:    12/11/2023  Time:    17:46               Narrative:    EXAMINATION:  CT RENAL STONE STUDY ABD PELVIS WO    CLINICAL HISTORY:  Flank pain, kidney stone suspected;    TECHNIQUE:  Low dose axial images, sagittal and coronal reformations were obtained from the lung bases to the pubic symphysis.  Contrast was not administered.    COMPARISON:  None    FINDINGS:  Heart: Normal in size. No pericardial effusion.    Lung Bases: Well aerated, without consolidation or pleural fluid.    Liver: Normal in size and attenuation, with no focal hepatic lesions.    Gallbladder: No calcified gallstones.    Bile Ducts: No evidence of dilated ducts.    Pancreas: No mass or peripancreatic fat  stranding.    Spleen: Unremarkable.    Adrenals: Unremarkable.    Kidneys/ Ureters: Unremarkable.    Bladder: No evidence of wall thickening.    Reproductive organs: Unremarkable.    GI Tract/Mesentery: No evidence of bowel obstruction or inflammation.    Peritoneal Space: No ascites. No free air.    Retroperitoneum: No significant adenopathy.    Abdominal wall: Unremarkable.    Vasculature: No significant atherosclerosis or aneurysm.    Bones: No acute fracture.                                       Medications   LIDOcaine 5 % patch 1 patch (1 patch Transdermal Patch Applied 12/11/23 1511)   ketorolac tablet 10 mg (10 mg Oral Given 12/11/23 1511)   methocarbamoL tablet 750 mg (750 mg Oral Given 12/11/23 1511)   morphine injection 4 mg (4 mg Intravenous Given 12/11/23 1604)     Medical Decision Making  Differential Diagnosis includes, but is not limited to:  Cauda equina syndrome, diskitis/osteomyelitis, epidural/paraspinal abscess, AAA, aortic dissection, post-op/hardware infection, trauma/vertebral fracture, spinal cord injury, disc herniation, spinal stenosis, sciatica, radiculopathy, neoplasm, lumbar muscle strain, muscle spasm, neuropathic pain, UTI/pyelonephritis, nephrolithiasis.     Amount and/or Complexity of Data Reviewed  Labs: ordered. Decision-making details documented in ED Course.  Radiology: ordered.    Risk  Prescription drug management.               ED Course as of 12/11/23 1803   Mon Dec 11, 2023   1546 Pt states the meds have not helped much with pain control. UA to be obtained and additional meds ordered.  [DT]   1610 POCT urine pregnancy [DT]   1610 Urinalysis, Reflex to Urine Culture Urine, Clean Catch [DT]   1610 CBC auto differential(!) [DT]   1630 Comprehensive metabolic panel(!) [DT]   1658 CT pending.  [DT]   1756 FINDINGS:  Heart: Normal in size. No pericardial effusion.     Lung Bases: Well aerated, without consolidation or pleural fluid.     Liver: Normal in size and attenuation, with  no focal hepatic lesions.     Gallbladder: No calcified gallstones.     Bile Ducts: No evidence of dilated ducts.     Pancreas: No mass or peripancreatic fat stranding.     Spleen: Unremarkable.     Adrenals: Unremarkable.     Kidneys/ Ureters: Unremarkable.     Bladder: No evidence of wall thickening.     Reproductive organs: Unremarkable.     GI Tract/Mesentery: No evidence of bowel obstruction or inflammation.     Peritoneal Space: No ascites. No free air.     Retroperitoneum: No significant adenopathy.     Abdominal wall: Unremarkable.     Vasculature: No significant atherosclerosis or aneurysm.     Bones: No acute fracture.     Impression:     No evidence of  tract stone or obstruction.      [DT]   1756 PT reported to me she is feeling better at this time. She will need close follow up with pcp for additional imaging if back pain persists/flank pain. Pt will be dcd with Robaxin, lidoderm and ibuprofen if needed. Stable for dc with strict return precautions given.  [DT]      ED Course User Index  [DT] Roopa Alston NP                             Clinical Impression:  Final diagnoses:  [M54.9] Back pain, unspecified back location, unspecified back pain laterality, unspecified chronicity (Primary)  [R10.9] Flank pain          ED Disposition Condition    Discharge Stable          ED Prescriptions       Medication Sig Dispense Start Date End Date Auth. Provider    ibuprofen (ADVIL,MOTRIN) 800 MG tablet Take 1 tablet (800 mg total) by mouth every 6 (six) hours as needed for Pain. 20 tablet 12/11/2023 -- Roopa Alston NP    methocarbamoL (ROBAXIN) 500 MG Tab Take 1 tablet (500 mg total) by mouth 3 (three) times daily. for 5 days 15 tablet 12/11/2023 12/16/2023 Roopa Alston NP    LIDOcaine (LIDODERM) 5 % Place 1 patch onto the skin once daily. Remove & Discard patch within 12 hours or as directed by MD for 7 days 7 patch 12/11/2023 12/18/2023 Roopa Alston NP          Follow-up Information        Follow up With Specialties Details Why Contact Info Additional Information    [facility] Family Medicine Schedule an appointment as soon as possible for a visit in 2 days  [address] [facility], [address]  [facility] [address] [phone] Please park in [address] or [address] and use [address]. Take Medical Office Bldg. elevators.             Roopa Alston NP  12/11/23 1802       Roopa Alston NP  12/11/23 1801

## 2023-12-12 NOTE — DISCHARGE INSTRUCTIONS

## 2024-02-08 ENCOUNTER — HOSPITAL ENCOUNTER (EMERGENCY)
Facility: HOSPITAL | Age: 54
Discharge: HOME | End: 2024-02-08
Attending: EMERGENCY MEDICINE
Payer: MEDICAID

## 2024-02-08 VITALS
OXYGEN SATURATION: 99 % | WEIGHT: 120 LBS | HEART RATE: 88 BPM | DIASTOLIC BLOOD PRESSURE: 94 MMHG | RESPIRATION RATE: 18 BRPM | TEMPERATURE: 98 F | HEIGHT: 62 IN | SYSTOLIC BLOOD PRESSURE: 181 MMHG | BODY MASS INDEX: 22.08 KG/M2

## 2024-02-08 DIAGNOSIS — L03.011 PARONYCHIA OF FINGER OF RIGHT HAND: Primary | ICD-10-CM

## 2024-02-08 PROCEDURE — 99284 EMERGENCY DEPT VISIT MOD MDM: CPT | Mod: 25

## 2024-02-08 PROCEDURE — 10060 I&D ABSCESS SIMPLE/SINGLE: CPT

## 2024-02-08 PROCEDURE — 25000003 PHARM REV CODE 250

## 2024-02-08 PROCEDURE — 25000003 PHARM REV CODE 250: Performed by: PHYSICIAN ASSISTANT

## 2024-02-08 RX ORDER — NAPROXEN 500 MG/1
500 TABLET ORAL 2 TIMES DAILY WITH MEALS
Qty: 30 TABLET | Refills: 0 | Status: SHIPPED | OUTPATIENT
Start: 2024-02-08

## 2024-02-08 RX ORDER — SULFAMETHOXAZOLE AND TRIMETHOPRIM 800; 160 MG/1; MG/1
1 TABLET ORAL 2 TIMES DAILY
Qty: 14 TABLET | Refills: 0 | Status: SHIPPED | OUTPATIENT
Start: 2024-02-08 | End: 2024-02-15

## 2024-02-08 RX ORDER — LIDOCAINE HYDROCHLORIDE 10 MG/ML
5 INJECTION, SOLUTION EPIDURAL; INFILTRATION; INTRACAUDAL; PERINEURAL
Status: DISCONTINUED | OUTPATIENT
Start: 2024-02-08 | End: 2024-02-08

## 2024-02-08 RX ORDER — ACETAMINOPHEN 500 MG
1000 TABLET ORAL
Status: COMPLETED | OUTPATIENT
Start: 2024-02-08 | End: 2024-02-08

## 2024-02-08 RX ORDER — LIDOCAINE HYDROCHLORIDE 10 MG/ML
10 INJECTION, SOLUTION EPIDURAL; INFILTRATION; INTRACAUDAL; PERINEURAL
Status: COMPLETED | OUTPATIENT
Start: 2024-02-08 | End: 2024-02-08

## 2024-02-08 RX ORDER — BACITRACIN ZINC 500 UNIT/G
OINTMENT (GRAM) TOPICAL 2 TIMES DAILY
Qty: 30 G | Refills: 0 | Status: SHIPPED | OUTPATIENT
Start: 2024-02-08

## 2024-02-08 RX ADMIN — LIDOCAINE HYDROCHLORIDE 100 MG: 10 INJECTION, SOLUTION EPIDURAL; INFILTRATION; INTRACAUDAL at 03:02

## 2024-02-08 RX ADMIN — ACETAMINOPHEN 1000 MG: 500 TABLET ORAL at 03:02

## 2024-02-08 NOTE — ED PROVIDER NOTES
"Encounter Date: 2/8/2024       History     Chief Complaint   Patient presents with    Hand Pain     Pain and swelling to the right thumb X2 weeks. Drained it herself several days ago but it got worse.      53-year-old female presents to ED with concern of pain swelling to right thumb that began 2 weeks ago.  Denies any specific injury or trauma, stating swelling began after she removed a "hangnail".  She does admit to expressing large amount of pus from wound site 3-4 days ago but states swelling has returned.  No numbness or focal weakness, fevers or chills.  No other acute complaints at this time.    The history is provided by the patient.     Review of patient's allergies indicates:  No Known Allergies  No past medical history on file.  No past surgical history on file.  No family history on file.     Review of Systems   Constitutional:  Negative for chills and fever.   Skin:  Positive for wound.   Neurological:  Negative for weakness and numbness.       Physical Exam     Initial Vitals [02/08/24 1409]   BP Pulse Resp Temp SpO2   (!) 181/94 88 18 98.2 °F (36.8 °C) 99 %      MAP       --         Physical Exam    Nursing note and vitals reviewed.  Constitutional: She appears well-developed and well-nourished. She is active. She does not have a sickly appearance. She does not appear ill. No distress.   HENT:   Head: Normocephalic and atraumatic.   Neck:   Normal range of motion.  Pulmonary/Chest: Effort normal.   Musculoskeletal:      Cervical back: Normal range of motion.      Comments: Swelling to right thumb to radial aspect of thumb nail.  Swelling does extend towards nail bed.  Denying pain to volar aspect with no obvious felon development at this time.  Distal sensation intact.  ROM of IP joint intact.  Brisk capillary refill.     Neurological: She is alert. GCS eye subscore is 4. GCS verbal subscore is 5. GCS motor subscore is 6.   Skin: Skin is warm and dry.   Psychiatric: She has a normal mood and affect. " Her speech is normal and behavior is normal.         ED Course   I & D - Incision and Drainage    Date/Time: 2/8/2024 4:52 PM  Location procedure was performed: [facility] EMERGENCY DEPARTMENT    Performed by: Kieran Vernon PA-C  Authorized by: Pablo Leong MD  Consent Done: Yes  Consent: Verbal consent obtained.  Patient identity confirmed: name  Type: abscess (Right thumb paronychia)  Body area: upper extremity  Location details: right thumb  Anesthesia: digital block    Anesthesia:  Local Anesthetic: lidocaine 1% without epinephrine    Patient sedated: no  Scalpel size: 18 gauge.  Complexity: simple  Drainage: pus  Drainage amount: scant  Wound treatment: wound left open  Patient tolerance: Patient tolerated the procedure well with no immediate complications        Labs Reviewed - No data to display       Imaging Results    None          Medications   acetaminophen tablet 1,000 mg (1,000 mg Oral Given 2/8/24 1536)   LIDOcaine (PF) 10 mg/ml (1%) injection 100 mg (100 mg Infiltration Given 2/8/24 1536)     Medical Decision Making  Patient presents with concern of pain swelling to right thumb that began 2 weeks ago.  Afebrile.  Paronychia formation noted to radial aspect of right thumb nail.  Appearing neurovascularly intact.    DDx:  Including but not limited to cellulitis, abscess, felon, paronychia    Does not appear to have felon development at this time.  Exam finding does appear consistent with paronychia.  Bedside I&D performed with purulent drainage.  Sterile dressings were applied.  Prescription written for topical and oral antibiotics.  Encouraged Epsom salt soaking, monitoring closely with close PCP follow-up.  ED return precautions were discussed.  Patient states her understanding and agrees with plan.    Risk  Prescription drug management.                                      Clinical Impression:  Final diagnoses:  [L03.011] Paronychia of finger of right hand (Primary)          ED Disposition Condition     Discharge Stable          ED Prescriptions       Medication Sig Dispense Start Date End Date Auth. Provider    naproxen (NAPROSYN) 500 MG tablet Take 1 tablet (500 mg total) by mouth 2 (two) times daily with meals. 30 tablet 2/8/2024 -- Kieran Vernon PA-C    bacitracin 500 unit/gram Oint Apply topically 2 (two) times daily. 30 g 2/8/2024 -- Kieran Vernon PA-C    sulfamethoxazole-trimethoprim 800-160mg (BACTRIM DS) 800-160 mg Tab Take 1 tablet by mouth 2 (two) times daily. for 7 days 14 tablet 2/8/2024 2/15/2024 Kieran Vernon PA-C          Follow-up Information       Follow up With Specialties Details Why Contact Info    Your Doctor                 Kieran Vernon PA-C  02/08/24 3256

## 2024-02-08 NOTE — ED TRIAGE NOTES
Paronychia right thumb x 2 weeks. Patient states she had a hangnail a couple of weeks ago that she clipped. Soon after developed pain and redness along nailbed. She drained it herself at home and expressed a lot of purulent drainage but it redeveloped a few days later. Denies fever. Presents in no distress.

## 2024-02-08 NOTE — Clinical Note
"Rosette MILLANTH" Wood was seen and treated in our emergency department on 2/8/2024.  She may return to work on 02/10/2024.       If you have any questions or concerns, please don't hesitate to call.      Kieran Vernon PA-C"

## 2024-06-17 ENCOUNTER — HOSPITAL ENCOUNTER (EMERGENCY)
Facility: HOSPITAL | Age: 54
Discharge: HOME | End: 2024-06-17
Attending: EMERGENCY MEDICINE
Payer: MEDICAID

## 2024-06-17 VITALS
RESPIRATION RATE: 17 BRPM | BODY MASS INDEX: 19.2 KG/M2 | DIASTOLIC BLOOD PRESSURE: 86 MMHG | SYSTOLIC BLOOD PRESSURE: 140 MMHG | WEIGHT: 105 LBS | OXYGEN SATURATION: 100 % | TEMPERATURE: 98 F | HEART RATE: 92 BPM

## 2024-06-17 DIAGNOSIS — R10.9 ABDOMINAL PAIN, UNSPECIFIED ABDOMINAL LOCATION: Primary | ICD-10-CM

## 2024-06-17 DIAGNOSIS — R19.7 DIARRHEA, UNSPECIFIED TYPE: ICD-10-CM

## 2024-06-17 LAB
ALBUMIN SERPL BCP-MCNC: 3.5 G/DL (ref 3.5–5.2)
ALP SERPL-CCNC: 90 U/L (ref 55–135)
ALT SERPL W/O P-5'-P-CCNC: 6 U/L (ref 10–44)
ANION GAP SERPL CALC-SCNC: 11 MMOL/L (ref 8–16)
AST SERPL-CCNC: 10 U/L (ref 10–40)
BASOPHILS # BLD AUTO: 0.16 K/UL (ref 0–0.2)
BASOPHILS NFR BLD: 1.5 % (ref 0–1.9)
BILIRUB SERPL-MCNC: 0.1 MG/DL (ref 0.1–1)
BILIRUB UR QL STRIP: NEGATIVE
BUN SERPL-MCNC: 7 MG/DL (ref 6–20)
CALCIUM SERPL-MCNC: 9.4 MG/DL (ref 8.7–10.5)
CAOX CRY URNS QL MICRO: NORMAL
CHLORIDE SERPL-SCNC: 104 MMOL/L (ref 95–110)
CK SERPL-CCNC: 67 U/L (ref 20–180)
CLARITY UR: CLEAR
CO2 SERPL-SCNC: 26 MMOL/L (ref 23–29)
COLOR UR: YELLOW
CREAT SERPL-MCNC: 0.7 MG/DL (ref 0.5–1.4)
DIFFERENTIAL METHOD BLD: ABNORMAL
EOSINOPHIL # BLD AUTO: 0.2 K/UL (ref 0–0.5)
EOSINOPHIL NFR BLD: 1.9 % (ref 0–8)
ERYTHROCYTE [DISTWIDTH] IN BLOOD BY AUTOMATED COUNT: 21.2 % (ref 11.5–14.5)
EST. GFR  (NO RACE VARIABLE): >60 ML/MIN/1.73 M^2
GLUCOSE SERPL-MCNC: 103 MG/DL (ref 70–110)
GLUCOSE UR QL STRIP: NEGATIVE
HCT VFR BLD AUTO: 26.3 % (ref 37–48.5)
HGB BLD-MCNC: 7.7 G/DL (ref 12–16)
HGB UR QL STRIP: NEGATIVE
IMM GRANULOCYTES # BLD AUTO: 0.03 K/UL (ref 0–0.04)
IMM GRANULOCYTES NFR BLD AUTO: 0.3 % (ref 0–0.5)
KETONES UR QL STRIP: NEGATIVE
LEUKOCYTE ESTERASE UR QL STRIP: ABNORMAL
LYMPHOCYTES # BLD AUTO: 1.9 K/UL (ref 1–4.8)
LYMPHOCYTES NFR BLD: 17.8 % (ref 18–48)
MAGNESIUM SERPL-MCNC: 2 MG/DL (ref 1.6–2.6)
MCH RBC QN AUTO: 17.4 PG (ref 27–31)
MCHC RBC AUTO-ENTMCNC: 29.3 G/DL (ref 32–36)
MCV RBC AUTO: 59 FL (ref 82–98)
MICROSCOPIC COMMENT: NORMAL
MONOCYTES # BLD AUTO: 1 K/UL (ref 0.3–1)
MONOCYTES NFR BLD: 9.1 % (ref 4–15)
NEUTROPHILS # BLD AUTO: 7.5 K/UL (ref 1.8–7.7)
NEUTROPHILS NFR BLD: 69.4 % (ref 38–73)
NITRITE UR QL STRIP: NEGATIVE
NRBC BLD-RTO: 0 /100 WBC
PH UR STRIP: 6 [PH] (ref 5–8)
PLATELET # BLD AUTO: 651 K/UL (ref 150–450)
PMV BLD AUTO: 9.6 FL (ref 9.2–12.9)
POTASSIUM SERPL-SCNC: 3.6 MMOL/L (ref 3.5–5.1)
PROT SERPL-MCNC: 7.4 G/DL (ref 6–8.4)
PROT UR QL STRIP: ABNORMAL
RBC # BLD AUTO: 4.43 M/UL (ref 4–5.4)
RBC #/AREA URNS HPF: 2 /HPF (ref 0–4)
SODIUM SERPL-SCNC: 141 MMOL/L (ref 136–145)
SP GR UR STRIP: 1.02 (ref 1–1.03)
SQUAMOUS #/AREA URNS HPF: 4 /HPF
URN SPEC COLLECT METH UR: ABNORMAL
UROBILINOGEN UR STRIP-ACNC: NEGATIVE EU/DL
WBC # BLD AUTO: 10.74 K/UL (ref 3.9–12.7)
WBC #/AREA URNS HPF: 4 /HPF (ref 0–5)

## 2024-06-17 PROCEDURE — 25000003 PHARM REV CODE 250

## 2024-06-17 PROCEDURE — 83735 ASSAY OF MAGNESIUM: CPT

## 2024-06-17 PROCEDURE — 81000 URINALYSIS NONAUTO W/SCOPE: CPT

## 2024-06-17 PROCEDURE — 96374 THER/PROPH/DIAG INJ IV PUSH: CPT

## 2024-06-17 PROCEDURE — 63600175 PHARM REV CODE 636 W HCPCS

## 2024-06-17 PROCEDURE — 82550 ASSAY OF CK (CPK): CPT

## 2024-06-17 PROCEDURE — 80053 COMPREHEN METABOLIC PANEL: CPT

## 2024-06-17 PROCEDURE — 85025 COMPLETE CBC W/AUTO DIFF WBC: CPT

## 2024-06-17 PROCEDURE — 99284 EMERGENCY DEPT VISIT MOD MDM: CPT | Mod: 25

## 2024-06-17 RX ORDER — ONDANSETRON HYDROCHLORIDE 2 MG/ML
4 INJECTION, SOLUTION INTRAVENOUS
Status: COMPLETED | OUTPATIENT
Start: 2024-06-17 | End: 2024-06-17

## 2024-06-17 RX ORDER — DICYCLOMINE HYDROCHLORIDE 10 MG/1
20 CAPSULE ORAL
Status: COMPLETED | OUTPATIENT
Start: 2024-06-17 | End: 2024-06-17

## 2024-06-17 RX ORDER — ONDANSETRON 4 MG/1
4 TABLET, ORALLY DISINTEGRATING ORAL EVERY 8 HOURS PRN
Qty: 15 TABLET | Refills: 0 | Status: SHIPPED | OUTPATIENT
Start: 2024-06-17 | End: 2024-06-18 | Stop reason: SDUPTHER

## 2024-06-17 RX ORDER — ORPHENADRINE CITRATE 100 MG/1
100 TABLET, EXTENDED RELEASE ORAL NIGHTLY PRN
Qty: 10 TABLET | Refills: 0 | Status: SHIPPED | OUTPATIENT
Start: 2024-06-17 | End: 2024-06-18 | Stop reason: SDUPTHER

## 2024-06-17 RX ORDER — DICYCLOMINE HYDROCHLORIDE 20 MG/1
20 TABLET ORAL 4 TIMES DAILY
Qty: 28 TABLET | Refills: 0 | Status: SHIPPED | OUTPATIENT
Start: 2024-06-17 | End: 2024-06-18 | Stop reason: SDUPTHER

## 2024-06-17 RX ADMIN — DICYCLOMINE HYDROCHLORIDE 20 MG: 10 CAPSULE ORAL at 05:06

## 2024-06-17 RX ADMIN — ONDANSETRON 4 MG: 2 INJECTION INTRAMUSCULAR; INTRAVENOUS at 05:06

## 2024-06-18 RX ORDER — ONDANSETRON 4 MG/1
4 TABLET, ORALLY DISINTEGRATING ORAL EVERY 8 HOURS PRN
Qty: 15 TABLET | Refills: 0 | Status: SHIPPED | OUTPATIENT
Start: 2024-06-18

## 2024-06-18 RX ORDER — ORPHENADRINE CITRATE 100 MG/1
100 TABLET, EXTENDED RELEASE ORAL NIGHTLY PRN
Qty: 10 TABLET | Refills: 0 | Status: SHIPPED | OUTPATIENT
Start: 2024-06-18

## 2024-06-18 RX ORDER — DICYCLOMINE HYDROCHLORIDE 20 MG/1
20 TABLET ORAL 4 TIMES DAILY
Qty: 28 TABLET | Refills: 0 | Status: SHIPPED | OUTPATIENT
Start: 2024-06-18 | End: 2024-06-25

## 2024-06-18 NOTE — ED PROVIDER NOTES
Encounter Date: 6/17/2024       History     Chief Complaint   Patient presents with    Abdominal Pain     Abdominal pain x 3 weeks, worse right after eating with diarrhea and nausea. Tried multiple medications without relief. Decreased appetite.      Patient presents generalized abdominal cramping along with diarrhea for the past 3 days.  She denies any nausea or vomiting.  She denies any hematochezia or melena.  She denies any fever or chills.  Denies any dysuria or urinary frequency.    The history is provided by the patient.     Review of patient's allergies indicates:  No Known Allergies  No past medical history on file.  No past surgical history on file.  No family history on file.     Review of Systems   Gastrointestinal:  Positive for diarrhea.        Abdominal cramping       Physical Exam     Initial Vitals [06/17/24 1359]   BP Pulse Resp Temp SpO2   (!) 163/82 104 18 98.6 °F (37 °C) 100 %      MAP       --         Physical Exam    Vitals reviewed.  Constitutional: She appears well-developed. No distress.   Cardiovascular:  Normal rate and regular rhythm.           No murmur heard.  Pulmonary/Chest: Breath sounds normal. She has no wheezes.   Abdominal:   Abdomen is soft, there is no tenderness to palpation with no rigidity and no distention   Musculoskeletal:         General: Normal range of motion.     Neurological: She is alert and oriented to person, place, and time. She has normal strength. No sensory deficit.   Skin: Skin is warm and dry. No rash noted.         ED Course   Procedures  Labs Reviewed   CBC W/ AUTO DIFFERENTIAL - Abnormal; Notable for the following components:       Result Value    Hemoglobin 7.7 (*)     Hematocrit 26.3 (*)     MCV 59 (*)     MCH 17.4 (*)     MCHC 29.3 (*)     RDW 21.2 (*)     Platelets 651 (*)     Lymph % 17.8 (*)     All other components within normal limits   COMPREHENSIVE METABOLIC PANEL - Abnormal; Notable for the following components:    ALT 6 (*)     All other  components within normal limits   URINALYSIS, REFLEX TO URINE CULTURE - Abnormal; Notable for the following components:    Protein, UA Trace (*)     Leukocytes, UA 1+ (*)     All other components within normal limits    Narrative:     Specimen Source->Urine   MAGNESIUM   CK   URINALYSIS MICROSCOPIC    Narrative:     Specimen Source->Urine          Imaging Results    None          Medications   ondansetron injection 4 mg (4 mg Intravenous Given 6/17/24 1732)   dicyclomine capsule 20 mg (20 mg Oral Given 6/17/24 1709)     Medical Decision Making  After the administration of Bentyl the patient states that she is feeling much better is hungry and wants to go home.  She is able to tolerate fluids while in the department.  She is instructed to return immediately for any new or worsening symptoms and she verbalized understanding.    Amount and/or Complexity of Data Reviewed  Labs:  Decision-making details documented in ED Course.    Risk  Prescription drug management.  Risk Details: Differential diagnosis includes but is not limited to:  GERD, gastritis, pancreatitis, gastroenteritis, electrolyte abnormality, UTI               ED Course as of 06/19/24 1119   Mon Jun 17, 2024 1818 CBC auto differential(!)  CBC relatively unremarkable.  No increase in white blood cells.  Hemoglobin decreased to 7.7.  MCV decreased to 59.  Microcytic anemia.  Platelet count elevated to 651. [BJ]   1818 Comprehensive metabolic panel(!)  CMP relatively unremarkable.  Electrolytes within normal limits.  BUN and creatinine within normal limits.  LFTs grossly unremarkable. [BJ]   1819 Magnesium  Magnesium within normal limits. [BJ]   1819 CPK  CPK within normal limits. [BJ]   1819 Urinalysis, Reflex to Urine Culture Urine, Clean Catch(!)  Urinalysis with 1+ leukocytes.  No occult blood.  No ketones. [BJ]   1819 Urinalysis Microscopic  Microscopic UA with no bacteria. No increase in red or white blood cells. [BJ]   1945 Urinalysis, Reflex to  Urine Culture Urine, Clean Catch(!)  No UTI [CD]    CBC auto differential(!)  Chronic anemia [CD]    Comprehensive metabolic panel(!)  Nonspecific findings [CD]    Magnesium  Within normal limits [CD]    CPK  Within normal limits [CD]    Patient states that after Zofran and Bentyl administration she is feeling much better.  She is tolerating p.o. solids and liquids and is ready to go home. [CD]      ED Course User Index  [BJ] Pam Queen PA-C  [CD] Tim Osorio DO                           Clinical Impression:  Final diagnoses:  [R10.9] Abdominal pain, unspecified abdominal location (Primary)  [R19.7] Diarrhea, unspecified type          ED Disposition Condition    Discharge Stable          ED Prescriptions       Medication Sig Dispense Start Date End Date Auth. Provider    dicyclomine (BENTYL) 20 mg tablet () Take 1 tablet (20 mg total) by mouth 4 (four) times daily. for 7 days 28 tablet 2024 Tim Osorio DO    ondansetron (ZOFRAN-ODT) 4 MG TbDL () Take 1 tablet (4 mg total) by mouth every 8 (eight) hours as needed. 15 tablet 2024 Tim Osorio DO    orphenadrine (NORFLEX) 100 mg tablet () Take 1 tablet (100 mg total) by mouth nightly as needed (muscle cramps). 10 tablet 2024 Tim Osorio DO          Follow-up Information       Follow up With Specialties Details Why Contact Info Additional Information    [facility] Family Medicine Schedule an appointment as soon as possible for a visit   [address], [address]  [facility] [address] [phone] Please park in [address] or [address] and use [address]. Take Medical Office Bldg. elevators.             Tim Osorio DO  [phone]

## 2024-07-19 ENCOUNTER — LAB VISIT (OUTPATIENT)
Dept: LAB | Facility: HOSPITAL | Age: 54
End: 2024-07-19
Attending: EMERGENCY MEDICINE
Payer: MEDICAID

## 2024-07-19 ENCOUNTER — OFFICE VISIT (OUTPATIENT)
Dept: FAMILY MEDICINE | Facility: HOSPITAL | Age: 54
End: 2024-07-19
Attending: EMERGENCY MEDICINE
Payer: MEDICAID

## 2024-07-19 VITALS
DIASTOLIC BLOOD PRESSURE: 90 MMHG | BODY MASS INDEX: 17.93 KG/M2 | WEIGHT: 97.44 LBS | SYSTOLIC BLOOD PRESSURE: 135 MMHG | HEART RATE: 105 BPM | HEIGHT: 62 IN

## 2024-07-19 DIAGNOSIS — R10.9 ABDOMINAL PAIN, UNSPECIFIED ABDOMINAL LOCATION: ICD-10-CM

## 2024-07-19 DIAGNOSIS — Z11.3 ROUTINE SCREENING FOR STI (SEXUALLY TRANSMITTED INFECTION): ICD-10-CM

## 2024-07-19 DIAGNOSIS — A63.0 GENITAL WARTS DUE TO HPV (HUMAN PAPILLOMAVIRUS): ICD-10-CM

## 2024-07-19 DIAGNOSIS — R10.13 EPIGASTRIC PAIN: ICD-10-CM

## 2024-07-19 DIAGNOSIS — D64.9 ANEMIA, UNSPECIFIED TYPE: ICD-10-CM

## 2024-07-19 DIAGNOSIS — R19.7 DIARRHEA, UNSPECIFIED TYPE: ICD-10-CM

## 2024-07-19 DIAGNOSIS — Z12.4 CERVICAL CANCER SCREENING: ICD-10-CM

## 2024-07-19 DIAGNOSIS — R63.4 UNINTENDED WEIGHT LOSS: ICD-10-CM

## 2024-07-19 DIAGNOSIS — K62.5 RECTAL BLEEDING: Primary | ICD-10-CM

## 2024-07-19 LAB
ALBUMIN SERPL BCP-MCNC: 3.5 G/DL (ref 3.5–5.2)
ALP SERPL-CCNC: 113 U/L (ref 55–135)
ALT SERPL W/O P-5'-P-CCNC: <5 U/L (ref 10–44)
ANION GAP SERPL CALC-SCNC: 12 MMOL/L (ref 8–16)
AST SERPL-CCNC: 10 U/L (ref 10–40)
BASOPHILS # BLD AUTO: 0.12 K/UL (ref 0–0.2)
BASOPHILS NFR BLD: 1 % (ref 0–1.9)
BILIRUB SERPL-MCNC: 0.2 MG/DL (ref 0.1–1)
BUN SERPL-MCNC: 7 MG/DL (ref 6–20)
CALCIUM SERPL-MCNC: 9.8 MG/DL (ref 8.7–10.5)
CHLORIDE SERPL-SCNC: 99 MMOL/L (ref 95–110)
CHOLEST SERPL-MCNC: 249 MG/DL (ref 120–199)
CHOLEST/HDLC SERPL: 5.9 {RATIO} (ref 2–5)
CO2 SERPL-SCNC: 29 MMOL/L (ref 23–29)
CREAT SERPL-MCNC: 0.7 MG/DL (ref 0.5–1.4)
CRP SERPL-MCNC: 16.6 MG/L (ref 0–8.2)
DIFFERENTIAL METHOD BLD: ABNORMAL
EOSINOPHIL # BLD AUTO: 0.2 K/UL (ref 0–0.5)
EOSINOPHIL NFR BLD: 1.3 % (ref 0–8)
ERYTHROCYTE [DISTWIDTH] IN BLOOD BY AUTOMATED COUNT: 22.4 % (ref 11.5–14.5)
EST. GFR  (NO RACE VARIABLE): >60 ML/MIN/1.73 M^2
ESTIMATED AVG GLUCOSE: 111 MG/DL (ref 68–131)
GLUCOSE SERPL-MCNC: 104 MG/DL (ref 70–110)
HBA1C MFR BLD: 5.5 % (ref 4–5.6)
HCT VFR BLD AUTO: 29.6 % (ref 37–48.5)
HCV AB SERPL QL IA: NORMAL
HDLC SERPL-MCNC: 42 MG/DL (ref 40–75)
HDLC SERPL: 16.9 % (ref 20–50)
HGB BLD-MCNC: 8.3 G/DL (ref 12–16)
HIV 1+2 AB+HIV1 P24 AG SERPL QL IA: NORMAL
IMM GRANULOCYTES # BLD AUTO: 0.05 K/UL (ref 0–0.04)
IMM GRANULOCYTES NFR BLD AUTO: 0.4 % (ref 0–0.5)
LDLC SERPL CALC-MCNC: 175.6 MG/DL (ref 63–159)
LYMPHOCYTES # BLD AUTO: 1.7 K/UL (ref 1–4.8)
LYMPHOCYTES NFR BLD: 13.9 % (ref 18–48)
MCH RBC QN AUTO: 16.2 PG (ref 27–31)
MCHC RBC AUTO-ENTMCNC: 28 G/DL (ref 32–36)
MCV RBC AUTO: 58 FL (ref 82–98)
MONOCYTES # BLD AUTO: 1 K/UL (ref 0.3–1)
MONOCYTES NFR BLD: 8.2 % (ref 4–15)
NEUTROPHILS # BLD AUTO: 9.1 K/UL (ref 1.8–7.7)
NEUTROPHILS NFR BLD: 75.2 % (ref 38–73)
NONHDLC SERPL-MCNC: 207 MG/DL
NRBC BLD-RTO: 0 /100 WBC
PLATELET # BLD AUTO: 681 K/UL (ref 150–450)
PMV BLD AUTO: 9.4 FL (ref 9.2–12.9)
POTASSIUM SERPL-SCNC: 3.3 MMOL/L (ref 3.5–5.1)
PROT SERPL-MCNC: 7.8 G/DL (ref 6–8.4)
RBC # BLD AUTO: 5.12 M/UL (ref 4–5.4)
SODIUM SERPL-SCNC: 140 MMOL/L (ref 136–145)
TREPONEMA PALLIDUM IGG+IGM AB [PRESENCE] IN SERUM OR PLASMA BY IMMUNOASSAY: NONREACTIVE
TRIGL SERPL-MCNC: 157 MG/DL (ref 30–150)
TSH SERPL DL<=0.005 MIU/L-ACNC: 0.63 UIU/ML (ref 0.4–4)
WBC # BLD AUTO: 12.1 K/UL (ref 3.9–12.7)

## 2024-07-19 PROCEDURE — 86140 C-REACTIVE PROTEIN: CPT

## 2024-07-19 PROCEDURE — 80061 LIPID PANEL: CPT

## 2024-07-19 PROCEDURE — 86803 HEPATITIS C AB TEST: CPT

## 2024-07-19 PROCEDURE — 36415 COLL VENOUS BLD VENIPUNCTURE: CPT

## 2024-07-19 PROCEDURE — 80053 COMPREHEN METABOLIC PANEL: CPT

## 2024-07-19 PROCEDURE — 85025 COMPLETE CBC W/AUTO DIFF WBC: CPT

## 2024-07-19 PROCEDURE — 84443 ASSAY THYROID STIM HORMONE: CPT

## 2024-07-19 PROCEDURE — 83036 HEMOGLOBIN GLYCOSYLATED A1C: CPT

## 2024-07-19 PROCEDURE — 99215 OFFICE O/P EST HI 40 MIN: CPT

## 2024-07-19 PROCEDURE — 87389 HIV-1 AG W/HIV-1&-2 AB AG IA: CPT

## 2024-07-19 PROCEDURE — 86593 SYPHILIS TEST NON-TREP QUANT: CPT
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
## 2024-07-24 ENCOUNTER — HOSPITAL ENCOUNTER (OUTPATIENT)
Dept: RADIOLOGY | Facility: HOSPITAL | Age: 54
Discharge: HOME | End: 2024-07-24
Payer: MEDICAID

## 2024-07-24 DIAGNOSIS — R10.13 EPIGASTRIC PAIN: ICD-10-CM

## 2024-07-24 PROCEDURE — 74150 CT ABDOMEN W/O CONTRAST: CPT | Mod: 26,,, | Performed by: STUDENT IN AN ORGANIZED HEALTH CARE EDUCATION/TRAINING PROGRAM

## 2024-07-24 PROCEDURE — 74150 CT ABDOMEN W/O CONTRAST: CPT | Mod: TC

## 2024-07-29 ENCOUNTER — OFFICE VISIT (OUTPATIENT)
Dept: FAMILY MEDICINE | Facility: HOSPITAL | Age: 54
End: 2024-07-29
Payer: MEDICAID

## 2024-07-29 VITALS
HEART RATE: 111 BPM | WEIGHT: 97.88 LBS | BODY MASS INDEX: 18.01 KG/M2 | HEIGHT: 62 IN | SYSTOLIC BLOOD PRESSURE: 135 MMHG | DIASTOLIC BLOOD PRESSURE: 84 MMHG

## 2024-07-29 DIAGNOSIS — R10.9 ABDOMINAL PAIN, UNSPECIFIED ABDOMINAL LOCATION: Primary | ICD-10-CM

## 2024-07-29 DIAGNOSIS — A59.9 TRICHOMONAS VAGINALIS INFECTION: ICD-10-CM

## 2024-07-29 PROCEDURE — 99213 OFFICE O/P EST LOW 20 MIN: CPT

## 2024-07-29 RX ORDER — DICYCLOMINE HYDROCHLORIDE 10 MG/1
10 CAPSULE ORAL
Qty: 120 CAPSULE | Refills: 0 | Status: ON HOLD | OUTPATIENT
Start: 2024-07-29 | End: 2024-08-28

## 2024-07-29 RX ORDER — METRONIDAZOLE 500 MG/1
500 TABLET ORAL 2 TIMES DAILY
Qty: 60 TABLET | Refills: 0 | Status: ON HOLD | OUTPATIENT
Start: 2024-07-29

## 2024-07-30 ENCOUNTER — TELEPHONE (OUTPATIENT)
Dept: SURGERY | Facility: CLINIC | Age: 54
End: 2024-07-30
Payer: MEDICAID

## 2024-07-30 ENCOUNTER — TELEPHONE (OUTPATIENT)
Dept: ENDOSCOPY | Facility: HOSPITAL | Age: 54
End: 2024-07-30
Payer: MEDICAID

## 2024-07-30 VITALS — BODY MASS INDEX: 18.31 KG/M2 | WEIGHT: 97 LBS | HEIGHT: 61 IN

## 2024-07-30 DIAGNOSIS — Z12.11 SCREEN FOR COLON CANCER: Primary | ICD-10-CM

## 2024-07-30 DIAGNOSIS — R93.3 ABNORMAL FINDING ON GI TRACT IMAGING: ICD-10-CM

## 2024-07-30 DIAGNOSIS — R63.4 WEIGHT LOSS: Primary | ICD-10-CM

## 2024-07-30 RX ORDER — SODIUM, POTASSIUM,MAG SULFATES 17.5-3.13G
1 SOLUTION, RECONSTITUTED, ORAL ORAL DAILY
Qty: 1 KIT | Refills: 0 | Status: SHIPPED | OUTPATIENT
Start: 2024-07-30 | End: 2024-08-01

## 2024-07-30 NOTE — TELEPHONE ENCOUNTER
Colonoscopy Procedure Prep Instructions      Date of procedure: 8/1/24 Arrive at: 8:00am    Location of Department:   [facility] [place] [address]   1st Floor Same Day Surgery    As soon as possible:   your prep from pharmacy and over the counter DULCOLAX LAXATIVE TABLETS     On the day before your procedure   What You CAN do:   You may have clear liquids ONLY -see below for list.     Liquids That Are OK to Drink:   Water  Sports drinks (Gatorade, Power-Aid)  Coffee or tea (no cream or nondairy creamer)  Clear juices without pulp (apple, white grape)  Gelatin desserts (no fruit or toppings)  Clear soda (sprite, coke, ginger ale)  Chicken broth (until 12 midnight the night before procedure)      What You CANNOT do:   Do not EAT solid food, drink milk or anything   colored red.  Do not drink alcohol.  Do not take oral medications within 1 hour of starting   each dose of SUPREP.  No gum chewing or candy morning of procedure.      Note:   (Please disregard the insert instructions from pharmacy).  SUPREP Bowel Prep Kit is indicated for cleansing of the colon as a preparation for colonoscopy in adults.   Be sure to tell your doctor about all the medicines you take, including prescription and non-prescription medicines, vitamins, and herbal supplements. SUPREP Bowel Prep Kit may affect how other medicines work.  Medication taken by mouth may not be absorbed properly when taken within 1 hour before the start of each dose of SUPREP Bowel Prep Kit.    It is not uncommon to experience some abdominal cramping, nausea and/or vomiting when taking the prep. If you have nausea and/or vomiting while taking the prep, stop drinking for 20 to 30 minutes then continue.    How to take prep:    SUPREP Bowel Prep Kit is a (2-day) prep.   Both 6-ounce bottles are required for a complete preparation for colonoscopy. Dilute the solution concentrate as directed prior to use. You must drink water  with each dose of SUPREP, and additional water after each dose.    DOSE 1--Day Before Colonoscopy 7/31/24    Drink at least 6 to 8 glasses of clear liquids from time you wake up until you begin your prep and then continue until bedtime to avoid dehydration.     12:00 pm (NOON) Take four (4) Dulcolax (Bisacodyl) tablets with at least 8 ounces or more of clear liquids.      6:00 pm:    You must complete Steps 1 through 4 using one (1) 6-ounce bottle before going to bed as shown below:    Step 1-Pour ONE (1) 6-ounce bottle of SUPREP liquid into the mixing container.  Step 2-Add cool drinking water to the 16-ounce line on the container and mix.  Step 3-Drink ALL the liquid in the container.  Step 4-You must drink two (2) more 16-ounce containers of water over the next 1 hour.  IMPORTANT: If you experience preparation-related symptoms (for example, nausea, bloating, or cramping), stop, or slow the rate of drinking the additional water until your symptoms decrease.    DOSE 2--Day of the Colonoscopy 8/1/24 at 2-3 AM.    For this dose, repeat Steps 1 through 4 shown above using the other 6-ounce bottle.   You may continue drinking water/clear liquids until   4 hours before your colonoscopy or as directed by the scheduling nurse  5:00am   For information about your procedure, two (2) things to view prior to colonoscopy:  Please watch this informational video. It is important to watch this animated consent video prior to your arrival. If you haven't watched the video prior to arriving, you are required to watch it during admission which can causes delays.    Options for viewing:   Using a keyboard:  press and hold the control tab (Ctrl) and left mouse click to follow links.           Colonoscopy Instructional Video                                                                                   OR    Type link address into your web browser's address bar:  [email or web address]/[email or web address]      Educational Booklet  "with pictures:      Colonoscopy Prep - Liquid      Comments:           IMPORTANT INFORMATION TO KNOW BEFORE YOUR PROCEDURE    [facility] 1st Floor     When you arrive:  1. You will see an American flag flying in the front of the hospital from urban Call as close as you can in that lot.  2. Behind the flag, you will see a covered [address], enter through those automatic double doors.  3. Immediately when you enter, you should be greeted by a team member who can assist you.  4. If not, follow the hallway immediately in front of you to the right towards the cafeteria.  5. Once you reach the cafeteria, there are only two options. You can either enter the cafeteria or continue down the hallway to your left. You will want to continue down the hallway all the way down to your left until you absolutely cannot go anymore. You will then find yourself in our Same Day Surgery lobby where you will check in behind the glass window."         If your procedure requires the administration of anesthesia, it is necessary for a responsible adult to drive you home. (Medical Transportation, Uber, Lyft, Taxi, etc. may ONLY be used if a responsible adult is present to accompany you home.  The responsible adult CAN'T be the  of the service).      person must be available to return to pick you up within 15 minutes of being notified of discharge.       Please bring a picture ID, insurance card, & copayment      Take Medications as directed below:        If you begin taking any blood thinning medications, injectable weight loss/diabetes medications (other than insulin) , or Adipex (Phentermine) please contact the endoscopy scheduling department listed below as soon as possible.    If you are diabetic see the attached instruction sheet regarding your medication.     If you take HEART, BLOOD PRESSURE, SEIZURE, PAIN, LUNG (including inhalers/nebulizers), ANTI-REJECTION (transplant patients), or " PSYCHIATRIC medications, please take at your regular times with a sip of water or as directed by the scheduling nurse.     Important contact information:    Endoscopy [phone] Hours of operation Monday-Friday 8:00-4:30pm.    Questions about insurance or financial obligations call [phone] or [phone].    If you have questions regarding the prep or need to reschedule, please call [phone]. After hours questions requiring immediate assistance, contact [facility] nurse line at [phone] or [phone].   NOTE:     On occasion, unforeseen circumstances may cause a delay in your procedure start time. We respect your time and appreciate your patience during these circumstances.      Comments:            Are you ready for your Colonoscopy?      __ If you take blood thinners,weight loss or diabetic injectable medications, have you stopped taking them according to your doctor's instructions before your procedures?  __ Have you stopped eating solid foods and followed a clear liquid diet a full day before your procedure or followed the diet       guidelines indicated in your instructions?       REMINDER: NO BROTH AFTER MIDNIGHT THE DAY BEFORE PROCEDURE.   __ Have you completed all your prep solution? PLEASE DO NOT FOLLOW the insert/or box instructions from pharmacy)  __ Have you taken your blood pressure, heart, seizure, or other essential medications the morning of your procedure?  __ Have you planned for a ride to and from procedure?  (Medical Transportation, Uber, Lyft, Taxi, etc. may ONLY be used if a responsible adult is present to accompany you home). The responsible adult CANNOT be the  of the service.  person must be available to return and pick you up within 15 minutes of being notified of discharge.           Questions or Concerns?  Please call us!  [phone] (M-F) [phone] (Nights and weekends)    Listed below are some helpful tips:    Please bring  protective cases for eyewear and hearing aids-wear comfortable clothing/shoes.  Follow prep instructions closely so you don't have to do it twice.  Bowel prep is prescription used to clean out the colon before a colonoscopy. The prep increases movement of your colon by causing you to have diarrhea (loose stools). Cleaning out stool from the colon helps your doctor to see in your colon clearly during this procedure.   It is important to stay hydrated before, during and after bowel prep to prevent loss of fluid (dehydration). You can have water and your choice of clear liquids. Reminder: these liquids you will drink in addition to the bowel prep.     Preparing the mixture:    First, mix the prep with water.  Make the taste better by adding a sugar free drink mix (Crystal Light) can improve the taste of your prep.   Use a large bore (opening) straw. Place towards the back of mouth (throat) as tolerated.  Prepare a prep mixture that is lightly chilled, but not ice-cold. Drinking a large amount of ice-cold liquid can make you feel very ill.  Avoid drinking any RED beverages or eating popsicles with this color for the 24 hours leading up to your procedure. This color can look like blood in the colon.    Consuming the prep:    Take your time. If you feel ill, take a 15-minute break from drinking the prep mixture  Combat hunger and dehydration with clear liquids. Options like JELL-O, or popsicles will help.  Settle in with good reading material. The goal is to clean out 6 feet of colon, so you can plan on spending a good deal of time in the bathroom. Have some good reading material on-hand or an iPad ready to keep yourself entertained!  Keep yourself comfortable. We recommend applying personal hygiene wipes, Tucks pads and a soothing ointment, like A&D ointment, Desitin, or Vaseline to your bottom before starting and as needed to protect your skin.   Comments:  [facility] Map for Endoscopy Procedures:

## 2024-07-30 NOTE — TELEPHONE ENCOUNTER
----- Message from XIN Adams MD sent at 7/29/2024  5:21 PM CDT -----  Regarding: RE: Appt request   I will put her in for a colonoscopy ASAP with first available.  She does not need to see me in clinic.      [place]  ----- Message -----  From: Christen Farley RN  Sent: 7/29/2024   3:02 PM CDT  To: XIN Adams MD  Subject: FW: Appt request                                 Please advise if she needs to see you or another provider.    Thanks!  ----- Message -----  From: Arabella Mills  Sent: 7/29/2024   2:19 PM CDT  To: Bryan Hunt Staff  Subject: Appt request                                     Good afternoon,     The patient has an appt scheduled with Dr. Hunt on September 26, 2024. However, her PCP would like her to be seen sooner if possible, please.     CT reading available in Epic:    Narrative & Impression  EXAMINATION:  CT ABDOMEN WITHOUT CONTRAST     CLINICAL HISTORY:  Epigastric pain;     TECHNIQUE:  Routine axial CT images of the abdomen were obtained without the use of IV contrast.   Sagittal and coronal reformatted images were also acquired.     COMPARISON:  CT renal stone study 12/11/2023     FINDINGS:  Heart is normal in size with no pericardial effusion.     4 mm right lung base nodule (axial series 2, image 23).  No consolidation, pleural effusion, or pneumothorax.     Liver is normal in size and attenuation with with a large geographic region of hypoattenuation about the left hepatic lobe measuring approximately 4.9 x 2.6 x 5.8 cm (series 2, image 34; series 601, image 22).  Evaluation is limited in the absence of contrast administration.     Gallbladder and biliary ducts are unremarkable.     The spleen, adrenal glands, and pancreas are unremarkable.     Kidneys are normal in size and location.  Few punctate densities, which may represent vascular calcifications or nonobstructing renal stones.  No hydronephrosis.  Visualized ureters are normal in course and  caliber.     Stomach and duodenum are unremarkable.  There is a prominent loop of small bowel in the right hemiabdomen without obvious evidence for obstructive or inflammatory process (axial series 2, image 88).     No abdominal ascites or free intra-abdominal air.  No obvious mesenteric or retroperitoneal lymphadenopathy.     Moderate calcific atherosclerosis about the visualized aorta.     Osseous structures demonstrate no evidence for acute fracture or osseous destructive lesion.     Impression:     Large focal region hypoattenuation about the left hepatic lobe measuring up to 5.8 cm in cranial caudal dimension.  This region is incompletely evaluated given noncontrast examination.  Further evaluation with dedicated CT or MR liver protocol is recommended.     4 mm right lung base nodule.  For a solid nodule <6 mm, Fleischner Society 2017 guidelines recommend no routine follow up for a low risk patient, or follow-up with non-contrast chest CT at 12 months in a high risk patient.     Few punctate densities about the kidneys, which may represent vascular calcifications or tiny nonobstructing renal stones.  No hydronephrosis.     Prominent loop of small bowel in the right hemiabdomen is partially visualized.  No definite evidence for obstructive or inflammatory process.  Correlation is advised.     Aortic atherosclerosis.

## 2024-07-30 NOTE — TELEPHONE ENCOUNTER
Spoke to Rosette to schedule procedure(s) Colonoscopy       Physician to perform procedure(s) Dr. XIN Hunt [place]  Date of Procedure (s) 8/1/24  Arrival Time 8:00 AM  Time of Procedure(s) 9:00 AM   Location of Procedure(s) [address]   Type of Rx Prep sent to patient: Suprep  Instructions provided to patient via Email    Patient was informed on the following information and verbalized understanding. Screening questionnaire reviewed with patient and complete. If procedure requires anesthesia, a responsible adult needs to be present to accompany the patient home, patient cannot drive after receiving anesthesia. Appointment details are tentative, especially check-in time. Patient will receive a prep-op call 7 days prior to confirm check-in time for procedure. If applicable the patient should contact their pharmacy to verify Rx for procedure prep is ready for pick-up. Patient was advised to call the scheduling department at [phone] if pharmacy states no Rx is available. Patient was advised to call the endoscopy scheduling department if any questions or concerns arise.      [facility] Endoscopy Scheduling Department

## 2024-07-30 NOTE — TELEPHONE ENCOUNTER
"----- Message from Rose Rutherford sent at 7/30/2024  8:33 AM CDT -----    ----- Message -----  From: XIN Adams MD  Sent: 7/29/2024   5:23 PM CDT  To: [facility] Patients    Procedure: Colonoscopy    Diagnosis: Abnormal finding on GI tract imaging and Weight loss    Procedure Timing: Within 4 weeks (Urgent)    #If within 4 weeks selected, please gina as high priority#    #If greater than 12 weeks, please select "5-12 weeks" and delay sending until 3 months prior to requested date#     Location: Any Site.  Any provider.  THANK YOU!    Additional Scheduling Information: No scheduling concerns    Prep Specifications:Standard prep    Is the patient taking a GLP-1 Agonist:no    Have you attached a patient to this message: yes  "

## 2024-07-30 NOTE — TELEPHONE ENCOUNTER
Spoke with patient and explained that appointment cancelled per Dr. Adams's recommendation. Explained to patient that he has placed an order for colonoscopy and that someone will be in touch with her soon to schedule with first available provider. Patient verbalizes understanding. Message sent to PCP to make aware.

## 2024-07-31 ENCOUNTER — TELEPHONE (OUTPATIENT)
Dept: ENDOSCOPY | Facility: HOSPITAL | Age: 54
End: 2024-07-31
Payer: MEDICAID

## 2024-07-31 NOTE — TELEPHONE ENCOUNTER
Pt returned call to confirm Colonoscopy. Pt has prep and instructions and confirmed ride. Verified if any GLP1's and blood thinners. Pre-call complete, Pt does not have any further questions. Arrival time:  8 AM

## 2024-07-31 NOTE — TELEPHONE ENCOUNTER
Contacted Pt for Endoscopy precall to confirm scheduled procedure(s) Colonoscopy on 8/1/24. Pt did not answer. Left voicemail for pt to call Endoscopy Scheduling to confirm.

## 2024-07-31 NOTE — TELEPHONE ENCOUNTER
Received voicemail to [facility] Endoscopy scheduling main line.     11:20 AM My name is Rosette Wood [phone]. You can call me back at anytime.

## 2024-07-31 NOTE — TELEPHONE ENCOUNTER
Returning patient's voicemail. The patient did not answer the call. Unable to leave voicemail. No option to leave voice message.

## 2024-08-01 ENCOUNTER — NURSE TRIAGE (OUTPATIENT)
Dept: ADMINISTRATIVE | Facility: CLINIC | Age: 54
End: 2024-08-01
Payer: MEDICAID

## 2024-08-01 NOTE — TELEPHONE ENCOUNTER
Pt states scheduled for colonoscopy at 8am, pt states has vomited prep all night. During call, pt vomits multiple times. Difficult to triage. Pt states too weak to stand. Per protocol, call  now. Offered to call ambulance for pt, pt declines, states she will call. Advised pt to lie down with feet elevated. Pt verbalizes understanding.    Reason for Disposition   Shock suspected (e.g., cold/pale/clammy skin, too weak to stand, low BP, rapid pulse)    Protocols used: Colonoscopy Symptoms and Asmvjeoap-J-IL    
Former smoker

## 2024-08-05 ENCOUNTER — TELEPHONE (OUTPATIENT)
Dept: SURGERY | Facility: CLINIC | Age: 54
End: 2024-08-05
Payer: MEDICAID

## 2024-08-05 ENCOUNTER — NURSE TRIAGE (OUTPATIENT)
Dept: ADMINISTRATIVE | Facility: CLINIC | Age: 54
End: 2024-08-05
Payer: MEDICAID

## 2024-08-05 ENCOUNTER — HOSPITAL ENCOUNTER (INPATIENT)
Facility: HOSPITAL | Age: 54
LOS: 7 days | Discharge: HOME | DRG: 331 | End: 2024-08-13
Attending: EMERGENCY MEDICINE | Admitting: STUDENT IN AN ORGANIZED HEALTH CARE EDUCATION/TRAINING PROGRAM
Payer: MEDICAID

## 2024-08-05 DIAGNOSIS — K63.89 COLONIC MASS: Primary | ICD-10-CM

## 2024-08-05 DIAGNOSIS — K56.1 COLONIC INTUSSUSCEPTION: ICD-10-CM

## 2024-08-05 DIAGNOSIS — K56.1 INTUSSUSCEPTION: ICD-10-CM

## 2024-08-05 LAB
ALBUMIN SERPL BCP-MCNC: 3.4 G/DL (ref 3.5–5.2)
ALP SERPL-CCNC: 105 U/L (ref 55–135)
ALT SERPL W/O P-5'-P-CCNC: 7 U/L (ref 10–44)
ANION GAP SERPL CALC-SCNC: 16 MMOL/L (ref 8–16)
AST SERPL-CCNC: 11 U/L (ref 10–40)
BACTERIA #/AREA URNS HPF: ABNORMAL /HPF
BASOPHILS # BLD AUTO: 0.14 K/UL (ref 0–0.2)
BASOPHILS NFR BLD: 0.9 % (ref 0–1.9)
BILIRUB SERPL-MCNC: 0.1 MG/DL (ref 0.1–1)
BILIRUB UR QL STRIP: NEGATIVE
BUN SERPL-MCNC: 10 MG/DL (ref 6–20)
CALCIUM SERPL-MCNC: 9.6 MG/DL (ref 8.7–10.5)
CHLORIDE SERPL-SCNC: 92 MMOL/L (ref 95–110)
CLARITY UR: ABNORMAL
CO2 SERPL-SCNC: 28 MMOL/L (ref 23–29)
COLOR UR: YELLOW
CREAT SERPL-MCNC: 0.7 MG/DL (ref 0.5–1.4)
DIFFERENTIAL METHOD BLD: ABNORMAL
EOSINOPHIL # BLD AUTO: 0.1 K/UL (ref 0–0.5)
EOSINOPHIL NFR BLD: 0.6 % (ref 0–8)
ERYTHROCYTE [DISTWIDTH] IN BLOOD BY AUTOMATED COUNT: 23.3 % (ref 11.5–14.5)
EST. GFR  (NO RACE VARIABLE): >60 ML/MIN/1.73 M^2
GLUCOSE SERPL-MCNC: 130 MG/DL (ref 70–110)
GLUCOSE UR QL STRIP: ABNORMAL
HCT VFR BLD AUTO: 29.1 % (ref 37–48.5)
HGB BLD-MCNC: 8.4 G/DL (ref 12–16)
HGB UR QL STRIP: NEGATIVE
HYALINE CASTS #/AREA URNS LPF: 0 /LPF
IMM GRANULOCYTES # BLD AUTO: 0.08 K/UL (ref 0–0.04)
IMM GRANULOCYTES NFR BLD AUTO: 0.5 % (ref 0–0.5)
KETONES UR QL STRIP: NEGATIVE
LEUKOCYTE ESTERASE UR QL STRIP: ABNORMAL
LIPASE SERPL-CCNC: 6 U/L (ref 4–60)
LYMPHOCYTES # BLD AUTO: 2 K/UL (ref 1–4.8)
LYMPHOCYTES NFR BLD: 13.2 % (ref 18–48)
MCH RBC QN AUTO: 16.2 PG (ref 27–31)
MCHC RBC AUTO-ENTMCNC: 28.9 G/DL (ref 32–36)
MCV RBC AUTO: 56 FL (ref 82–98)
MICROSCOPIC COMMENT: ABNORMAL
MONOCYTES # BLD AUTO: 1.4 K/UL (ref 0.3–1)
MONOCYTES NFR BLD: 9.2 % (ref 4–15)
NEUTROPHILS # BLD AUTO: 11.4 K/UL (ref 1.8–7.7)
NEUTROPHILS NFR BLD: 75.6 % (ref 38–73)
NITRITE UR QL STRIP: NEGATIVE
NRBC BLD-RTO: 0 /100 WBC
PH UR STRIP: 7 [PH] (ref 5–8)
PLATELET # BLD AUTO: 860 K/UL (ref 150–450)
PMV BLD AUTO: 9.3 FL (ref 9.2–12.9)
POTASSIUM SERPL-SCNC: 3.2 MMOL/L (ref 3.5–5.1)
PROT SERPL-MCNC: 7.6 G/DL (ref 6–8.4)
PROT UR QL STRIP: ABNORMAL
RBC # BLD AUTO: 5.2 M/UL (ref 4–5.4)
RBC #/AREA URNS HPF: 4 /HPF (ref 0–4)
SODIUM SERPL-SCNC: 136 MMOL/L (ref 136–145)
SP GR UR STRIP: 1.03 (ref 1–1.03)
URN SPEC COLLECT METH UR: ABNORMAL
UROBILINOGEN UR STRIP-ACNC: ABNORMAL EU/DL
WBC # BLD AUTO: 15.05 K/UL (ref 3.9–12.7)
WBC #/AREA URNS HPF: 12 /HPF (ref 0–5)

## 2024-08-05 PROCEDURE — 63600175 PHARM REV CODE 636 W HCPCS

## 2024-08-05 PROCEDURE — 25000003 PHARM REV CODE 250: Performed by: PHYSICIAN ASSISTANT

## 2024-08-05 PROCEDURE — 87086 URINE CULTURE/COLONY COUNT: CPT | Performed by: PHYSICIAN ASSISTANT

## 2024-08-05 PROCEDURE — 25000003 PHARM REV CODE 250: Performed by: EMERGENCY MEDICINE

## 2024-08-05 PROCEDURE — G0378 HOSPITAL OBSERVATION PER HR: HCPCS

## 2024-08-05 PROCEDURE — 96374 THER/PROPH/DIAG INJ IV PUSH: CPT

## 2024-08-05 PROCEDURE — 96361 HYDRATE IV INFUSION ADD-ON: CPT

## 2024-08-05 PROCEDURE — 83690 ASSAY OF LIPASE: CPT | Performed by: PHYSICIAN ASSISTANT

## 2024-08-05 PROCEDURE — 96375 TX/PRO/DX INJ NEW DRUG ADDON: CPT

## 2024-08-05 PROCEDURE — 63600175 PHARM REV CODE 636 W HCPCS: Performed by: PHYSICIAN ASSISTANT

## 2024-08-05 PROCEDURE — 81000 URINALYSIS NONAUTO W/SCOPE: CPT | Performed by: PHYSICIAN ASSISTANT

## 2024-08-05 PROCEDURE — 25500020 PHARM REV CODE 255: Performed by: EMERGENCY MEDICINE

## 2024-08-05 PROCEDURE — 85025 COMPLETE CBC W/AUTO DIFF WBC: CPT | Performed by: PHYSICIAN ASSISTANT

## 2024-08-05 PROCEDURE — 80053 COMPREHEN METABOLIC PANEL: CPT | Performed by: PHYSICIAN ASSISTANT

## 2024-08-05 PROCEDURE — 99285 EMERGENCY DEPT VISIT HI MDM: CPT | Mod: 25

## 2024-08-05 PROCEDURE — 63600175 PHARM REV CODE 636 W HCPCS: Performed by: EMERGENCY MEDICINE

## 2024-08-05 RX ORDER — METOCLOPRAMIDE HYDROCHLORIDE 5 MG/ML
10 INJECTION INTRAMUSCULAR; INTRAVENOUS
Status: COMPLETED | OUTPATIENT
Start: 2024-08-05 | End: 2024-08-05

## 2024-08-05 RX ORDER — POTASSIUM CHLORIDE 7.45 MG/ML
10 INJECTION INTRAVENOUS
Status: COMPLETED | OUTPATIENT
Start: 2024-08-05 | End: 2024-08-06

## 2024-08-05 RX ORDER — TALC
6 POWDER (GRAM) TOPICAL NIGHTLY PRN
Status: DISCONTINUED | OUTPATIENT
Start: 2024-08-05 | End: 2024-08-13 | Stop reason: HOSPADM

## 2024-08-05 RX ORDER — LIDOCAINE HYDROCHLORIDE 20 MG/ML
15 SOLUTION OROPHARYNGEAL ONCE
Status: COMPLETED | OUTPATIENT
Start: 2024-08-05 | End: 2024-08-05

## 2024-08-05 RX ORDER — ONDANSETRON HYDROCHLORIDE 2 MG/ML
4 INJECTION, SOLUTION INTRAVENOUS EVERY 6 HOURS PRN
Status: DISCONTINUED | OUTPATIENT
Start: 2024-08-05 | End: 2024-08-13 | Stop reason: HOSPADM

## 2024-08-05 RX ORDER — SODIUM CHLORIDE 0.9 % (FLUSH) 0.9 %
10 SYRINGE (ML) INJECTION
Status: DISCONTINUED | OUTPATIENT
Start: 2024-08-05 | End: 2024-08-13 | Stop reason: HOSPADM

## 2024-08-05 RX ORDER — MORPHINE SULFATE 2 MG/ML
2 INJECTION, SOLUTION INTRAMUSCULAR; INTRAVENOUS EVERY 6 HOURS PRN
Status: DISCONTINUED | OUTPATIENT
Start: 2024-08-05 | End: 2024-08-06

## 2024-08-05 RX ORDER — DEXTROSE MONOHYDRATE AND SODIUM CHLORIDE 5; .9 G/100ML; G/100ML
INJECTION, SOLUTION INTRAVENOUS CONTINUOUS
Status: DISCONTINUED | OUTPATIENT
Start: 2024-08-05 | End: 2024-08-05

## 2024-08-05 RX ORDER — FAMOTIDINE 10 MG/ML
40 INJECTION INTRAVENOUS
Status: COMPLETED | OUTPATIENT
Start: 2024-08-05 | End: 2024-08-05

## 2024-08-05 RX ORDER — DEXTROSE MONOHYDRATE, SODIUM CHLORIDE, AND POTASSIUM CHLORIDE 50; 1.49; 4.5 G/1000ML; G/1000ML; G/1000ML
INJECTION, SOLUTION INTRAVENOUS CONTINUOUS
Status: DISCONTINUED | OUTPATIENT
Start: 2024-08-05 | End: 2024-08-08

## 2024-08-05 RX ORDER — ALUMINUM HYDROXIDE, MAGNESIUM HYDROXIDE, AND SIMETHICONE 1200; 120; 1200 MG/30ML; MG/30ML; MG/30ML
30 SUSPENSION ORAL ONCE
Status: COMPLETED | OUTPATIENT
Start: 2024-08-05 | End: 2024-08-05

## 2024-08-05 RX ORDER — ONDANSETRON HYDROCHLORIDE 2 MG/ML
4 INJECTION, SOLUTION INTRAVENOUS
Status: COMPLETED | OUTPATIENT
Start: 2024-08-05 | End: 2024-08-05

## 2024-08-05 RX ADMIN — DEXTROSE, SODIUM CHLORIDE, AND POTASSIUM CHLORIDE: 5; .45; .15 INJECTION INTRAVENOUS at 11:08

## 2024-08-05 RX ADMIN — ALUMINUM HYDROXIDE, MAGNESIUM HYDROXIDE, AND SIMETHICONE 30 ML: 1200; 120; 1200 SUSPENSION ORAL at 05:08

## 2024-08-05 RX ADMIN — METOCLOPRAMIDE 10 MG: 5 INJECTION, SOLUTION INTRAMUSCULAR; INTRAVENOUS at 05:08

## 2024-08-05 RX ADMIN — ONDANSETRON 4 MG: 2 INJECTION INTRAMUSCULAR; INTRAVENOUS at 05:08

## 2024-08-05 RX ADMIN — SODIUM CHLORIDE 1000 ML: 9 INJECTION, SOLUTION INTRAVENOUS at 03:08

## 2024-08-05 RX ADMIN — FAMOTIDINE 40 MG: 10 INJECTION, SOLUTION INTRAVENOUS at 05:08

## 2024-08-05 RX ADMIN — POTASSIUM CHLORIDE 10 MEQ: 7.46 INJECTION, SOLUTION INTRAVENOUS at 11:08

## 2024-08-05 RX ADMIN — POTASSIUM CHLORIDE 10 MEQ: 7.46 INJECTION, SOLUTION INTRAVENOUS at 10:08

## 2024-08-05 RX ADMIN — LIDOCAINE HYDROCHLORIDE 15 ML: 20 SOLUTION ORAL at 05:08

## 2024-08-05 RX ADMIN — MELATONIN TAB 3 MG 6 MG: 3 TAB at 11:08

## 2024-08-05 RX ADMIN — MORPHINE SULFATE 2 MG: 2 INJECTION, SOLUTION INTRAMUSCULAR; INTRAVENOUS at 11:08

## 2024-08-05 RX ADMIN — IOHEXOL 75 ML: 350 INJECTION, SOLUTION INTRAVENOUS at 06:08

## 2024-08-05 NOTE — ED NOTES
"Pt arrived from home w/ C/O abd pain for >1M. Pt states that she has intermittent nausea, vomiting, and constant diarrhea. Pt states that she "can not sleep lying down" and has to "sit straight up" d/t the pain. Pt states that she feels like "something is moving around inside her belly." Pt endorses trying to drink soup, but has been unable to fully keep anything down. Pt denies vomiting today, but also states she has not tried to eat anything today.  Pt appears uncomfortable and unwell.   "

## 2024-08-05 NOTE — ED PROVIDER NOTES
Encounter Date: 8/5/2024       History     Chief Complaint   Patient presents with    Fatigue     Pt c/o fatigue w/ N/VD. Pt states had recent CT and found spots on liver. Pt had colonoscopy scheduled for last Thursday and was unable to make due to sx. Pt denies other complaints.      Rosette Wood is a 53 y.o. female who  has no past medical history on file.    The patient presents to the ED due to increased fatigue over the past couple of days.  Patient has been having intermittent abdominal pain for the past couple of months which she reports is present today.  She was meant to get a colonoscopy a couple of days ago but due to transportation issues in inability to tolerate the bowel regimen she did not get her colonoscopy.  She on a previous CT scan was found to have a liver mass.  And is followed by family medicine.  She denies any fever chest pain shortness of breath or new symptoms.  She reports diarrhea today but no vomiting.    The history is provided by the patient.     Review of patient's allergies indicates:  No Known Allergies  No past medical history on file.  No past surgical history on file.  No family history on file.  Social History     Tobacco Use    Smoking status: Never     Review of Systems   Constitutional:  Positive for fatigue. Negative for chills and fever.   HENT:  Negative for sore throat.    Respiratory:  Negative for cough and shortness of breath.    Cardiovascular:  Negative for chest pain.   Gastrointestinal:  Positive for abdominal pain and diarrhea. Negative for nausea and vomiting.   Genitourinary:  Negative for dysuria, frequency and urgency.   Musculoskeletal:  Negative for back pain, neck pain and neck stiffness.   Skin:  Negative for rash and wound.   Neurological:  Negative for syncope and weakness.   Hematological:  Does not bruise/bleed easily.   Psychiatric/Behavioral:  Negative for agitation, behavioral problems and confusion.        Physical Exam     Initial Vitals  [08/05/24 1353]   BP Pulse Resp Temp SpO2   138/84 (!) 123 18 98.2 °F (36.8 °C) 98 %      MAP       --         Physical Exam    Nursing note and vitals reviewed.  Constitutional: She appears well-developed and well-nourished. She is not diaphoretic. No distress.   HENT:   Head: Normocephalic and atraumatic.   Mouth/Throat: Oropharynx is clear and moist.   Eyes: EOM are normal. Pupils are equal, round, and reactive to light.   Neck: No tracheal deviation present.   Cardiovascular:  Normal rate, regular rhythm, normal heart sounds and intact distal pulses.           Pulmonary/Chest: Breath sounds normal. No stridor. No respiratory distress. She has no wheezes.   Abdominal: Abdomen is soft. Bowel sounds are normal. She exhibits no distension and no mass. There is no abdominal tenderness.   Musculoskeletal:         General: No edema. Normal range of motion.     Neurological: She is alert and oriented to person, place, and time. No cranial nerve deficit or sensory deficit.   Skin: Skin is warm and dry. Capillary refill takes less than 2 seconds. No rash noted.   Psychiatric: She has a normal mood and affect.         ED Course   Procedures  Labs Reviewed   CBC W/ AUTO DIFFERENTIAL - Abnormal       Result Value    WBC 15.05 (*)     RBC 5.20      Hemoglobin 8.4 (*)     Hematocrit 29.1 (*)     MCV 56 (*)     MCH 16.2 (*)     MCHC 28.9 (*)     RDW 23.3 (*)     Platelets 860 (*)     MPV 9.3      Immature Granulocytes 0.5      Gran # (ANC) 11.4 (*)     Immature Grans (Abs) 0.08 (*)     Lymph # 2.0      Mono # 1.4 (*)     Eos # 0.1      Baso # 0.14      nRBC 0      Gran % 75.6 (*)     Lymph % 13.2 (*)     Mono % 9.2      Eosinophil % 0.6      Basophil % 0.9      Differential Method Automated     COMPREHENSIVE METABOLIC PANEL - Abnormal    Sodium 136      Potassium 3.2 (*)     Chloride 92 (*)     CO2 28      Glucose 130 (*)     BUN 10      Creatinine 0.7      Calcium 9.6      Total Protein 7.6      Albumin 3.4 (*)     Total  Bilirubin 0.1      Alkaline Phosphatase 105      AST 11      ALT 7 (*)     eGFR >60      Anion Gap 16     URINALYSIS, REFLEX TO URINE CULTURE - Abnormal    Specimen UA Urine, Clean Catch      Color, UA Yellow      Appearance, UA Hazy (*)     pH, UA 7.0      Specific Gravity, UA 1.030      Protein, UA 1+ (*)     Glucose, UA Trace (*)     Ketones, UA Negative      Bilirubin (UA) Negative      Occult Blood UA Negative      Nitrite, UA Negative      Urobilinogen, UA 2.0-3.0 (*)     Leukocytes, UA Trace (*)     Narrative:     Specimen Source->Urine   URINALYSIS MICROSCOPIC - Abnormal    RBC, UA 4      WBC, UA 12 (*)     Bacteria None      Hyaline Casts, UA 0      Microscopic Comment SEE COMMENT      Narrative:     Specimen Source->Urine   CULTURE, URINE   LIPASE    Lipase 6            Imaging Results               CT Abdomen Pelvis With IV Contrast NO Oral Contrast (Final result)  Result time 08/05/24 19:55:08      Final result by Gianna Patrick MD (08/05/24 19:55:08)                   Impression:      There is a large colon-colon intussusception in the right upper quadrant involving the distal ascending and transverse colon.  There is nodular thickening of the central intussusception concerning for a colonic malignancy.  There is no evidence of extraluminal free air however note is made of the presence of acute ascites on this exam moderate in degree.  Prior CT was through the abdomen only with limited visualization of the bowel noting there is no evidence of significant bowel obstruction presently several small bowel loops of borderline normal caliber and are fluid-filled.  No convincing evidence of ischemic bowel.  Surgical consultation and metastatic workup with PET CT is recommended.    Additionally there is an enlarged left lobe of the liver mass compared to the prior exam 07/24/2024 concerning for metastatic disease now measuring 4.9 x 3.2 x 7 cm previously 4.9 x 2.6 x 5.8 cm on 07/24/2024 noting lack of IV  contrast limited evaluation.    A 2 x 2 cm right adrenal mass with heterogeneous enhancement is also noted which may represent metastatic lesion.    Enlarged right lower lobe pulmonary nodule now measuring 7.2 mm previously 4 mm on 07/24/2024 concerning for metastatic lesion    This report was flagged in Epic as abnormal.      Electronically signed by: Gianna Krishnatri  Date:    08/05/2024  Time:    19:55               Narrative:    EXAMINATION:  CT ABDOMEN PELVIS WITH IV CONTRAST    CLINICAL HISTORY:  Abdominal abscess/infection suspected;    TECHNIQUE:  Low dose axial images, sagittal and coronal reformations were obtained from the lung bases to the pubic symphysis following the IV administration of 75 mL of Omnipaque 350 .  Oral contrast was not administered.    COMPARISON:  07/24/2024 and 12/11/2023 CT abdomen pelvis    FINDINGS:  Abdomen:    - Lower thorax:Imaged heart is not significantly enlarged.    - Lung bases: There is a enlarged right lower lobe nodule now measuring 7.2 mm previously 4.2 mm on 07/24/2024.    - Liver: There is a 4.9 x 3.2 x 7 cm left lobe of the liver mass (axial image 33 series 3, coronal image 18 series 601) suspected to be increased in size as compared to the prior exam 7/24 (4.9 x 2.6 x 5.8 cm) noting this mass is better defined on this exam with contrast.    - Gallbladder: There is no evidence of cholelithiasis or cholecystitis by CT.    - Bile Ducts: No evidence of intra or extra hepatic biliary ductal dilation.    - Spleen: Negative.    - Kidneys: Renal cortices enhance symmetrically and homogeneously.  There is no evidence of renal calculi or hydronephrosis.    - Adrenals: There is a heterogeneously enhancing 2 0.1 x 2.1 cm right adrenal mass possibly metastatic lesion.  No associated calcification.  Left adrenal is unremarkable.    - Pancreas: No mass or peripancreatic fat stranding.    - Retroperitoneum:  No convincing adenopathy noting lack of intra-abdominal fat, ascites and  hazy appearance of the mesentery limits evaluation of mesentery for adenopathy.    - Vascular: No abdominal aortic aneurysm.  Mild scattered atherosclerotic calcifications noted    - Abdominal wall:  Unremarkable.    Bowel/Mesentery:    There is a large ascending colon into colon intussusception with thickened walls of the intussusception and enhancing nodularity compatible with malignancy.  The intussusception loop and vascular pedicle demonstrated enhancement.  The hepatic flexure appears involved.  There is note of several small bowel loops filled with fluid in the pelvis and several air-fluid levels.  No convincing significant obstruction is seen at this time noting ascites is now present and distal colon is unremarkable as imaged.  Appendix is normal.    Pelvis:    There is fluid in the pelvis.  Uterus and adnexa are unremarkable as imaged.  The bladder is relatively collapsed and unremarkable as imaged.    Bones:  No acute osseous abnormality and no suspicious lytic or blastic lesion.  There are degenerative changes involving the imaged spine without subluxation or compression fracture.  No convincing lytic or sclerotic osseous lesions are seen involving the imaged osseous structures.                                       Medications   sodium chloride 0.9% flush 10 mL (has no administration in time range)   melatonin tablet 6 mg (has no administration in time range)   morphine injection 2 mg (has no administration in time range)   ondansetron injection 4 mg (has no administration in time range)   dextrose 5 % and 0.45 % NaCl with KCl 20 mEq infusion (has no administration in time range)   potassium chloride 10 mEq in 100 mL IVPB (has no administration in time range)   sodium chloride 0.9% bolus 1,000 mL 1,000 mL (0 mLs Intravenous Stopped 8/5/24 1648)   ondansetron injection 4 mg (4 mg Intravenous Given 8/5/24 1722)   famotidine (PF) injection 40 mg (40 mg Intravenous Given 8/5/24 1721)   metoclopramide  injection 10 mg (10 mg Intravenous Given 8/5/24 1740)   aluminum-magnesium hydroxide-simethicone 200-200-20 mg/5 mL suspension 30 mL (30 mLs Oral Given 8/5/24 1740)     And   LIDOcaine viscous HCl 2% oral solution 15 mL (15 mLs Oral Given 8/5/24 1740)   iohexoL (OMNIPAQUE 350) injection 75 mL (75 mLs Intravenous Given 8/5/24 1840)     Medical Decision Making  Differential Diagnosis includes, but is not limited to:  AAA, aortic dissection, mesenteric ischemia, perforated viscous, MI/ACS, SBO/volvulus, incarcerated/strangulated hernia, intussusception, ileus, appendicitis, cholecystitis, cholangitis, diverticulitis, esophagitis, hepatitis, nephrolithiasis, pancreatitis, gastroenteritis, colitis, IBD/IBS, biliary colic, GERD, PUD, constipation, UTI/pyelonephritis,  disorder.      Amount and/or Complexity of Data Reviewed  Labs:  Decision-making details documented in ED Course.  Radiology: ordered.  Discussion of management or test interpretation with external provider(s): Discussed with Dr. Saxena, who will admit the patient to his service.  Recommends keeping NPO after midnight.     Risk  OTC drugs.  Prescription drug management.  Risk Details: Patient is a 53-year-old female presenting today with chronic abdominal pain fatigue and diarrhea.  No vomiting today.  Patient was being evaluated for liver mass and had a colonoscopy scheduled which she was unable to get.  Unfortunately her CT scan shows increasing size of her liver mass with mild ascites, adrenal mass, a mass to her R lung as well as intussusception with suspected colon malignancy.  Patient was informed of these findings.  She was informed of recommendation for admission and verbalizes understanding.               ED Course as of 08/05/24 2157   Mon Aug 05, 2024   1740 Comp. Metabolic Panel(!) [RN]   1740 Urinalysis, Reflex to Urine Culture Urine, Clean Catch(!) [RN]   1740 Urinalysis Microscopic(!) [RN]   1740 Lipase [RN]   1742 CBC W/ AUTO  DIFFERENTIAL(!)  Leukocytosis and thrombocytosis [RN]      ED Course User Index  [RN] Roverto Zamorano Jr., MD                             Clinical Impression:  Final diagnoses:  [K56.1] Intussusception (Primary)          ED Disposition Condition    Observation Stable               Portions of this note were dictated using voice recognition software and may contain dictation related errors in spelling/grammar/syntax not found on text review       Roverto Zamorano Jr., MD  08/05/24 1254

## 2024-08-06 ENCOUNTER — ANESTHESIA (OUTPATIENT)
Dept: SURGERY | Facility: HOSPITAL | Age: 54
End: 2024-08-06
Payer: MEDICAID

## 2024-08-06 ENCOUNTER — ANESTHESIA EVENT (OUTPATIENT)
Dept: SURGERY | Facility: HOSPITAL | Age: 54
End: 2024-08-06
Payer: MEDICAID

## 2024-08-06 PROBLEM — K56.1 COLONIC INTUSSUSCEPTION: Status: ACTIVE | Noted: 2024-08-06

## 2024-08-06 LAB
ABO + RH BLD: NORMAL
ALBUMIN SERPL BCP-MCNC: 2.7 G/DL (ref 3.5–5.2)
ANION GAP SERPL CALC-SCNC: 10 MMOL/L (ref 8–16)
B-HCG UR QL: NEGATIVE
BACTERIA UR CULT: NO GROWTH
BASOPHILS # BLD AUTO: 0.08 K/UL (ref 0–0.2)
BASOPHILS NFR BLD: 0.6 % (ref 0–1.9)
BLD GP AB SCN CELLS X3 SERPL QL: NORMAL
BLD PROD TYP BPU: NORMAL
BLOOD UNIT EXPIRATION DATE: NORMAL
BLOOD UNIT TYPE CODE: 6200
BLOOD UNIT TYPE: NORMAL
BUN SERPL-MCNC: 7 MG/DL (ref 6–20)
CALCIUM SERPL-MCNC: 8.7 MG/DL (ref 8.7–10.5)
CEA SERPL-MCNC: <1.7 NG/ML (ref 0–5)
CHLORIDE SERPL-SCNC: 101 MMOL/L (ref 95–110)
CO2 SERPL-SCNC: 25 MMOL/L (ref 23–29)
CODING SYSTEM: NORMAL
CREAT SERPL-MCNC: 0.6 MG/DL (ref 0.5–1.4)
CROSSMATCH INTERPRETATION: NORMAL
CTP QC/QA: YES
DIFFERENTIAL METHOD BLD: ABNORMAL
DISPENSE STATUS: NORMAL
EOSINOPHIL # BLD AUTO: 0.1 K/UL (ref 0–0.5)
EOSINOPHIL NFR BLD: 0.9 % (ref 0–8)
ERYTHROCYTE [DISTWIDTH] IN BLOOD BY AUTOMATED COUNT: 22.5 % (ref 11.5–14.5)
EST. GFR  (NO RACE VARIABLE): >60 ML/MIN/1.73 M^2
GLUCOSE SERPL-MCNC: 116 MG/DL (ref 70–110)
HCT VFR BLD AUTO: 24.9 % (ref 37–48.5)
HGB BLD-MCNC: 7.2 G/DL (ref 12–16)
IMM GRANULOCYTES # BLD AUTO: 0.08 K/UL (ref 0–0.04)
IMM GRANULOCYTES NFR BLD AUTO: 0.6 % (ref 0–0.5)
LYMPHOCYTES # BLD AUTO: 1.2 K/UL (ref 1–4.8)
LYMPHOCYTES NFR BLD: 8.7 % (ref 18–48)
MAGNESIUM SERPL-MCNC: 1.8 MG/DL (ref 1.6–2.6)
MCH RBC QN AUTO: 16.3 PG (ref 27–31)
MCHC RBC AUTO-ENTMCNC: 28.9 G/DL (ref 32–36)
MCV RBC AUTO: 57 FL (ref 82–98)
MONOCYTES # BLD AUTO: 1.5 K/UL (ref 0.3–1)
MONOCYTES NFR BLD: 10.7 % (ref 4–15)
NEUTROPHILS # BLD AUTO: 11 K/UL (ref 1.8–7.7)
NEUTROPHILS NFR BLD: 78.5 % (ref 38–73)
NRBC BLD-RTO: 0 /100 WBC
PHOSPHATE SERPL-MCNC: 2.4 MG/DL (ref 2.7–4.5)
PLATELET # BLD AUTO: 630 K/UL (ref 150–450)
PMV BLD AUTO: 9.5 FL (ref 9.2–12.9)
POTASSIUM SERPL-SCNC: 3.6 MMOL/L (ref 3.5–5.1)
PREALB SERPL-MCNC: 10 MG/DL (ref 20–43)
RBC # BLD AUTO: 4.41 M/UL (ref 4–5.4)
SODIUM SERPL-SCNC: 136 MMOL/L (ref 136–145)
SPECIMEN OUTDATE: NORMAL
TRANS ERYTHROCYTES VOL PATIENT: NORMAL ML
WBC # BLD AUTO: 14.05 K/UL (ref 3.9–12.7)

## 2024-08-06 PROCEDURE — C9290 INJ, BUPIVACAINE LIPOSOME: HCPCS | Performed by: STUDENT IN AN ORGANIZED HEALTH CARE EDUCATION/TRAINING PROGRAM

## 2024-08-06 PROCEDURE — 99900035 HC TECH TIME PER 15 MIN (STAT)

## 2024-08-06 PROCEDURE — 25000003 PHARM REV CODE 250: Performed by: NURSE ANESTHETIST, CERTIFIED REGISTERED

## 2024-08-06 PROCEDURE — 85025 COMPLETE CBC W/AUTO DIFF WBC: CPT

## 2024-08-06 PROCEDURE — 11000001 HC ACUTE MED/SURG PRIVATE ROOM

## 2024-08-06 PROCEDURE — 37000008 HC ANESTHESIA 1ST 15 MINUTES: Performed by: STUDENT IN AN ORGANIZED HEALTH CARE EDUCATION/TRAINING PROGRAM

## 2024-08-06 PROCEDURE — 0FB20ZX EXCISION OF LEFT LOBE LIVER, OPEN APPROACH, DIAGNOSTIC: ICD-10-PCS | Performed by: STUDENT IN AN ORGANIZED HEALTH CARE EDUCATION/TRAINING PROGRAM

## 2024-08-06 PROCEDURE — 0DBE0ZZ EXCISION OF LARGE INTESTINE, OPEN APPROACH: ICD-10-PCS | Performed by: STUDENT IN AN ORGANIZED HEALTH CARE EDUCATION/TRAINING PROGRAM

## 2024-08-06 PROCEDURE — 36415 COLL VENOUS BLD VENIPUNCTURE: CPT | Performed by: ANESTHESIOLOGY

## 2024-08-06 PROCEDURE — 36415 COLL VENOUS BLD VENIPUNCTURE: CPT

## 2024-08-06 PROCEDURE — 25000003 PHARM REV CODE 250: Performed by: STUDENT IN AN ORGANIZED HEALTH CARE EDUCATION/TRAINING PROGRAM

## 2024-08-06 PROCEDURE — 63600175 PHARM REV CODE 636 W HCPCS: Performed by: NURSE ANESTHETIST, CERTIFIED REGISTERED

## 2024-08-06 PROCEDURE — 86901 BLOOD TYPING SEROLOGIC RH(D): CPT | Performed by: ANESTHESIOLOGY

## 2024-08-06 PROCEDURE — 82378 CARCINOEMBRYONIC ANTIGEN: CPT

## 2024-08-06 PROCEDURE — 36000708 HC OR TIME LEV III 1ST 15 MIN: Performed by: STUDENT IN AN ORGANIZED HEALTH CARE EDUCATION/TRAINING PROGRAM

## 2024-08-06 PROCEDURE — 86900 BLOOD TYPING SEROLOGIC ABO: CPT | Performed by: ANESTHESIOLOGY

## 2024-08-06 PROCEDURE — 27201423 OPTIME MED/SURG SUP & DEVICES STERILE SUPPLY: Performed by: STUDENT IN AN ORGANIZED HEALTH CARE EDUCATION/TRAINING PROGRAM

## 2024-08-06 PROCEDURE — 63600175 PHARM REV CODE 636 W HCPCS

## 2024-08-06 PROCEDURE — 37000009 HC ANESTHESIA EA ADD 15 MINS: Performed by: STUDENT IN AN ORGANIZED HEALTH CARE EDUCATION/TRAINING PROGRAM

## 2024-08-06 PROCEDURE — 80069 RENAL FUNCTION PANEL: CPT

## 2024-08-06 PROCEDURE — 25000003 PHARM REV CODE 250: Performed by: EMERGENCY MEDICINE

## 2024-08-06 PROCEDURE — 94761 N-INVAS EAR/PLS OXIMETRY MLT: CPT

## 2024-08-06 PROCEDURE — 81025 URINE PREGNANCY TEST: CPT | Performed by: STUDENT IN AN ORGANIZED HEALTH CARE EDUCATION/TRAINING PROGRAM

## 2024-08-06 PROCEDURE — P9021 RED BLOOD CELLS UNIT: HCPCS | Performed by: ANESTHESIOLOGY

## 2024-08-06 PROCEDURE — 71000033 HC RECOVERY, INTIAL HOUR: Performed by: STUDENT IN AN ORGANIZED HEALTH CARE EDUCATION/TRAINING PROGRAM

## 2024-08-06 PROCEDURE — 0D1B0Z4 BYPASS ILEUM TO CUTANEOUS, OPEN APPROACH: ICD-10-PCS | Performed by: STUDENT IN AN ORGANIZED HEALTH CARE EDUCATION/TRAINING PROGRAM

## 2024-08-06 PROCEDURE — 71000039 HC RECOVERY, EACH ADD'L HOUR: Performed by: STUDENT IN AN ORGANIZED HEALTH CARE EDUCATION/TRAINING PROGRAM

## 2024-08-06 PROCEDURE — 86920 COMPATIBILITY TEST SPIN: CPT | Performed by: ANESTHESIOLOGY

## 2024-08-06 PROCEDURE — 99222 1ST HOSP IP/OBS MODERATE 55: CPT | Mod: ,,, | Performed by: STUDENT IN AN ORGANIZED HEALTH CARE EDUCATION/TRAINING PROGRAM

## 2024-08-06 PROCEDURE — 84134 ASSAY OF PREALBUMIN: CPT

## 2024-08-06 PROCEDURE — 83735 ASSAY OF MAGNESIUM: CPT

## 2024-08-06 PROCEDURE — 86920 COMPATIBILITY TEST SPIN: CPT | Performed by: STUDENT IN AN ORGANIZED HEALTH CARE EDUCATION/TRAINING PROGRAM

## 2024-08-06 PROCEDURE — 63600175 PHARM REV CODE 636 W HCPCS: Mod: JG | Performed by: STUDENT IN AN ORGANIZED HEALTH CARE EDUCATION/TRAINING PROGRAM

## 2024-08-06 PROCEDURE — 36000709 HC OR TIME LEV III EA ADD 15 MIN: Performed by: STUDENT IN AN ORGANIZED HEALTH CARE EDUCATION/TRAINING PROGRAM

## 2024-08-06 RX ORDER — DIPHENHYDRAMINE HYDROCHLORIDE 50 MG/ML
INJECTION INTRAMUSCULAR; INTRAVENOUS
Status: DISCONTINUED | OUTPATIENT
Start: 2024-08-06 | End: 2024-08-06

## 2024-08-06 RX ORDER — ONDANSETRON HYDROCHLORIDE 2 MG/ML
4 INJECTION, SOLUTION INTRAVENOUS DAILY PRN
Status: DISCONTINUED | OUTPATIENT
Start: 2024-08-06 | End: 2024-08-06

## 2024-08-06 RX ORDER — HYDROCODONE BITARTRATE AND ACETAMINOPHEN 5; 325 MG/1; MG/1
1 TABLET ORAL EVERY 4 HOURS PRN
Status: DISCONTINUED | OUTPATIENT
Start: 2024-08-06 | End: 2024-08-07

## 2024-08-06 RX ORDER — HYDROMORPHONE HYDROCHLORIDE 2 MG/ML
0.2 INJECTION, SOLUTION INTRAMUSCULAR; INTRAVENOUS; SUBCUTANEOUS EVERY 5 MIN PRN
Status: DISCONTINUED | OUTPATIENT
Start: 2024-08-06 | End: 2024-08-06

## 2024-08-06 RX ORDER — PROCHLORPERAZINE EDISYLATE 5 MG/ML
5 INJECTION INTRAMUSCULAR; INTRAVENOUS EVERY 30 MIN PRN
Status: DISCONTINUED | OUTPATIENT
Start: 2024-08-06 | End: 2024-08-06

## 2024-08-06 RX ORDER — HYDROCODONE BITARTRATE AND ACETAMINOPHEN 500; 5 MG/1; MG/1
TABLET ORAL
Status: DISCONTINUED | OUTPATIENT
Start: 2024-08-06 | End: 2024-08-07

## 2024-08-06 RX ORDER — ACETAMINOPHEN 10 MG/ML
INJECTION, SOLUTION INTRAVENOUS
Status: DISCONTINUED | OUTPATIENT
Start: 2024-08-06 | End: 2024-08-06

## 2024-08-06 RX ORDER — ROCURONIUM BROMIDE 10 MG/ML
INJECTION, SOLUTION INTRAVENOUS
Status: DISCONTINUED | OUTPATIENT
Start: 2024-08-06 | End: 2024-08-06

## 2024-08-06 RX ORDER — ONDANSETRON HYDROCHLORIDE 2 MG/ML
INJECTION, SOLUTION INTRAVENOUS
Status: DISCONTINUED | OUTPATIENT
Start: 2024-08-06 | End: 2024-08-06

## 2024-08-06 RX ORDER — PROPOFOL 10 MG/ML
VIAL (ML) INTRAVENOUS
Status: DISCONTINUED | OUTPATIENT
Start: 2024-08-06 | End: 2024-08-06

## 2024-08-06 RX ORDER — PHENYLEPHRINE HYDROCHLORIDE 10 MG/ML
INJECTION INTRAVENOUS
Status: DISCONTINUED | OUTPATIENT
Start: 2024-08-06 | End: 2024-08-06

## 2024-08-06 RX ORDER — OXYCODONE HYDROCHLORIDE 5 MG/1
10 TABLET ORAL EVERY 4 HOURS PRN
Status: DISCONTINUED | OUTPATIENT
Start: 2024-08-06 | End: 2024-08-06

## 2024-08-06 RX ORDER — HYDROMORPHONE HYDROCHLORIDE 2 MG/ML
INJECTION, SOLUTION INTRAMUSCULAR; INTRAVENOUS; SUBCUTANEOUS
Status: DISCONTINUED | OUTPATIENT
Start: 2024-08-06 | End: 2024-08-06

## 2024-08-06 RX ORDER — BUPIVACAINE HYDROCHLORIDE 5 MG/ML
INJECTION, SOLUTION PERINEURAL
Status: DISCONTINUED | OUTPATIENT
Start: 2024-08-06 | End: 2024-08-06 | Stop reason: HOSPADM

## 2024-08-06 RX ORDER — MIDAZOLAM HYDROCHLORIDE 1 MG/ML
INJECTION INTRAMUSCULAR; INTRAVENOUS
Status: DISCONTINUED | OUTPATIENT
Start: 2024-08-06 | End: 2024-08-06

## 2024-08-06 RX ORDER — DEXAMETHASONE SODIUM PHOSPHATE 4 MG/ML
INJECTION, SOLUTION INTRA-ARTICULAR; INTRALESIONAL; INTRAMUSCULAR; INTRAVENOUS; SOFT TISSUE
Status: DISCONTINUED | OUTPATIENT
Start: 2024-08-06 | End: 2024-08-06

## 2024-08-06 RX ORDER — FENTANYL CITRATE 50 UG/ML
INJECTION, SOLUTION INTRAMUSCULAR; INTRAVENOUS
Status: DISCONTINUED | OUTPATIENT
Start: 2024-08-06 | End: 2024-08-06

## 2024-08-06 RX ORDER — LIDOCAINE HYDROCHLORIDE 20 MG/ML
INJECTION INTRAVENOUS
Status: DISCONTINUED | OUTPATIENT
Start: 2024-08-06 | End: 2024-08-06

## 2024-08-06 RX ORDER — LABETALOL HYDROCHLORIDE 5 MG/ML
10 INJECTION, SOLUTION INTRAVENOUS EVERY 6 HOURS PRN
Status: DISCONTINUED | OUTPATIENT
Start: 2024-08-06 | End: 2024-08-13 | Stop reason: HOSPADM

## 2024-08-06 RX ORDER — SUCCINYLCHOLINE CHLORIDE 20 MG/ML
INJECTION INTRAMUSCULAR; INTRAVENOUS
Status: DISCONTINUED | OUTPATIENT
Start: 2024-08-06 | End: 2024-08-06

## 2024-08-06 RX ORDER — MORPHINE SULFATE 2 MG/ML
2 INJECTION, SOLUTION INTRAMUSCULAR; INTRAVENOUS EVERY 4 HOURS PRN
Status: DISCONTINUED | OUTPATIENT
Start: 2024-08-06 | End: 2024-08-13 | Stop reason: HOSPADM

## 2024-08-06 RX ORDER — GLUCAGON 1 MG
1 KIT INJECTION
Status: DISCONTINUED | OUTPATIENT
Start: 2024-08-06 | End: 2024-08-06

## 2024-08-06 RX ORDER — HYDRALAZINE HYDROCHLORIDE 20 MG/ML
10 INJECTION INTRAMUSCULAR; INTRAVENOUS EVERY 6 HOURS PRN
Status: DISCONTINUED | OUTPATIENT
Start: 2024-08-06 | End: 2024-08-13 | Stop reason: HOSPADM

## 2024-08-06 RX ORDER — OXYCODONE HYDROCHLORIDE 5 MG/1
5 TABLET ORAL
Status: DISCONTINUED | OUTPATIENT
Start: 2024-08-06 | End: 2024-08-06

## 2024-08-06 RX ORDER — KETAMINE HCL IN 0.9 % NACL 50 MG/5 ML
SYRINGE (ML) INTRAVENOUS
Status: DISCONTINUED | OUTPATIENT
Start: 2024-08-06 | End: 2024-08-06

## 2024-08-06 RX ADMIN — ROCURONIUM BROMIDE 50 MG: 10 INJECTION, SOLUTION INTRAVENOUS at 02:08

## 2024-08-06 RX ADMIN — PHENYLEPHRINE HYDROCHLORIDE 100 MCG: 10 INJECTION INTRAVENOUS at 02:08

## 2024-08-06 RX ADMIN — HYDRALAZINE HYDROCHLORIDE 10 MG: 20 INJECTION, SOLUTION INTRAMUSCULAR; INTRAVENOUS at 08:08

## 2024-08-06 RX ADMIN — SUGAMMADEX 200 MG: 100 INJECTION, SOLUTION INTRAVENOUS at 04:08

## 2024-08-06 RX ADMIN — SODIUM CHLORIDE: 0.9 INJECTION, SOLUTION INTRAVENOUS at 03:08

## 2024-08-06 RX ADMIN — DIPHENHYDRAMINE HYDROCHLORIDE 6.25 MG: 50 INJECTION, SOLUTION INTRAMUSCULAR; INTRAVENOUS at 02:08

## 2024-08-06 RX ADMIN — POTASSIUM CHLORIDE 10 MEQ: 7.46 INJECTION, SOLUTION INTRAVENOUS at 01:08

## 2024-08-06 RX ADMIN — GLYCOPYRROLATE 0.1 MG: 0.2 INJECTION, SOLUTION INTRAMUSCULAR; INTRAVITREAL at 02:08

## 2024-08-06 RX ADMIN — HYPROMELLOSE 2910 2 DROP: 5 SOLUTION OPHTHALMIC at 02:08

## 2024-08-06 RX ADMIN — POTASSIUM CHLORIDE 10 MEQ: 7.46 INJECTION, SOLUTION INTRAVENOUS at 12:08

## 2024-08-06 RX ADMIN — SODIUM CHLORIDE: 0.9 INJECTION, SOLUTION INTRAVENOUS at 04:08

## 2024-08-06 RX ADMIN — HYDROMORPHONE HYDROCHLORIDE 1 MG: 2 INJECTION INTRAMUSCULAR; INTRAVENOUS; SUBCUTANEOUS at 04:08

## 2024-08-06 RX ADMIN — Medication 25 MG: at 02:08

## 2024-08-06 RX ADMIN — HYDROMORPHONE HYDROCHLORIDE 0.4 MG: 2 INJECTION INTRAMUSCULAR; INTRAVENOUS; SUBCUTANEOUS at 05:08

## 2024-08-06 RX ADMIN — PROPOFOL 100 MG: 10 INJECTION, EMULSION INTRAVENOUS at 02:08

## 2024-08-06 RX ADMIN — FENTANYL CITRATE 50 MCG: 50 INJECTION INTRAMUSCULAR; INTRAVENOUS at 04:08

## 2024-08-06 RX ADMIN — ONDANSETRON 4 MG: 2 INJECTION, SOLUTION INTRAMUSCULAR; INTRAVENOUS at 04:08

## 2024-08-06 RX ADMIN — SUCCINYLCHOLINE CHLORIDE 120 MG: 20 INJECTION, SOLUTION INTRAMUSCULAR; INTRAVENOUS at 02:08

## 2024-08-06 RX ADMIN — DEXTROSE, SODIUM CHLORIDE, AND POTASSIUM CHLORIDE: 5; .45; .15 INJECTION INTRAVENOUS at 06:08

## 2024-08-06 RX ADMIN — HYDROMORPHONE HYDROCHLORIDE 0.6 MG: 2 INJECTION INTRAMUSCULAR; INTRAVENOUS; SUBCUTANEOUS at 04:08

## 2024-08-06 RX ADMIN — LIDOCAINE HYDROCHLORIDE 100 MG: 20 INJECTION, SOLUTION INTRAVENOUS at 02:08

## 2024-08-06 RX ADMIN — Medication 25 MG: at 03:08

## 2024-08-06 RX ADMIN — ONDANSETRON 4 MG: 2 INJECTION, SOLUTION INTRAMUSCULAR; INTRAVENOUS at 02:08

## 2024-08-06 RX ADMIN — FENTANYL CITRATE 50 MCG: 50 INJECTION INTRAMUSCULAR; INTRAVENOUS at 03:08

## 2024-08-06 RX ADMIN — MELATONIN TAB 3 MG 6 MG: 3 TAB at 10:08

## 2024-08-06 RX ADMIN — MIDAZOLAM HYDROCHLORIDE 2 MG: 1 INJECTION, SOLUTION INTRAMUSCULAR; INTRAVENOUS at 02:08

## 2024-08-06 RX ADMIN — DEXAMETHASONE SODIUM PHOSPHATE 8 MG: 4 INJECTION, SOLUTION INTRA-ARTICULAR; INTRALESIONAL; INTRAMUSCULAR; INTRAVENOUS; SOFT TISSUE at 02:08

## 2024-08-06 RX ADMIN — SODIUM CHLORIDE: 0.9 INJECTION, SOLUTION INTRAVENOUS at 02:08

## 2024-08-06 RX ADMIN — HYDROCODONE BITARTRATE AND ACETAMINOPHEN 1 TABLET: 5; 325 TABLET ORAL at 10:08

## 2024-08-06 RX ADMIN — ACETAMINOPHEN 1000 MG: 10 INJECTION, SOLUTION INTRAVENOUS at 03:08

## 2024-08-06 RX ADMIN — FENTANYL CITRATE 50 MCG: 50 INJECTION INTRAMUSCULAR; INTRAVENOUS at 02:08

## 2024-08-06 RX ADMIN — CEFTRIAXONE SODIUM 1 G: 1 INJECTION, POWDER, FOR SOLUTION INTRAMUSCULAR; INTRAVENOUS at 02:08

## 2024-08-06 NOTE — PLAN OF CARE
SOCIAL WORK DISCHARGE PLANNING ASSESSMENT    SW completed discharge planning assessment with pt. Pt was easily engaged and education on the role of  was provided. Pt's daughter was on speaker phone during the assessment. Pt reported she lives alone but has good support from family and friends. Pt stated she has no DME and is not current with  services. Pt's friends drive pt to doctor appointments and pt's friends will provide transportation home following discharge. No needs for community resources were reported. Pt was encouraged to call with any questions or concerns. Pt verbalized understanding.     Future Appointments   Date Time Provider Department Center   8/21/2024  1:20 PM Pedro Saxena MD [facility]   10/8/2024  2:30 PM Sam Langford MD [facility]        Patient Active Problem List   Diagnosis    Colonic intussusception      08/06/24 1047   Discharge Assessment   Assessment Type Discharge Planning Assessment   Confirmed/corrected address, phone number and insurance Yes   Confirmed Demographics Correct on Facesheet   Source of Information patient   Does patient/caregiver understand observation status Yes   Communicated HANK with patient/caregiver Date not available/Unable to determine   Reason For Admission intussusception   People in Home alone   Facility Arrived From: home   Do you expect to return to your current living situation? Yes   Do you have help at home or someone to help you manage your care at home? Yes   Who are your caregiver(s) and their phone number(s)? pt's daughter   Prior to hospitilization cognitive status: Alert/Oriented   Current cognitive status: Alert/Oriented   Walking or Climbing Stairs Difficulty no   Dressing/Bathing Difficulty no   Equipment Currently Used at Home none   Readmission within 30 days? No   Patient currently being followed by outpatient case management? No   Do you currently have service(s) that help you manage  your care at home? No   Do you have prescription coverage? Yes   Coverage Medicaid   Do you have any problems affording any of your prescribed medications? No   Is the patient taking medications as prescribed? yes   Who is going to help you get home at discharge? pt's daughter   How do you get to doctors appointments? family or friend will provide   Are you on dialysis? No   Do you take coumadin? No   Discharge Plan A Home   Discharge Plan B Home Health   DME Needed Upon Discharge    (TBD)   Discharge Plan discussed with: Patient   Transition of Care Barriers None   Physical Activity   On average, how many days per week do you engage in moderate to strenuous exercise (like a brisk walk)? 5 days   On average, how many minutes do you engage in exercise at this level? 60 min   Financial Resource Strain   How hard is it for you to pay for the very basics like food, housing, medical care, and heating? Not hard   Housing Stability   In the last 12 months, was there a time when you were not able to pay the mortgage or rent on time? N   At any time in the past 12 months, were you homeless or living in a shelter (including now)? N   Transportation Needs   Has the lack of transportation kept you from medical appointments, meetings, work or from getting things needed for daily living? No   Food Insecurity   Within the past 12 months, you worried that your food would run out before you got the money to buy more. Never true   Within the past 12 months, the food you bought just didn't last and you didn't have money to get more. Never true   Stress   Do you feel stress - tense, restless, nervous, or anxious, or unable to sleep at night because your mind is troubled all the time - these days? Only a littl   Social Isolation   How often do you feel lonely or isolated from those around you?  Never   Alcohol Use   Q1: How often do you have a drink containing alcohol? Never   Q2: How many drinks containing alcohol do you have on a  typical day when you are drinking? None   Q3: How often do you have six or more drinks on one occasion? Never   Utilities   In the past 12 months has the electric, gas, oil, or water company threatened to shut off services in your home? No   Health Literacy   How often do you need to have someone help you when you read instructions, pamphlets, or other written material from your doctor or pharmacy? Never

## 2024-08-06 NOTE — ED NOTES
Report given to SARKIS Merrill. All questions and concerns addressed. Pt to be transported upstairs by tech. RN okay with visitor.

## 2024-08-06 NOTE — H&P
[facility]  General Surgery  History & Physical    Patient Name: Rosette Wood  MRN: [id number]  Admission Date: 8/5/2024  Attending Physician: Pedro Saxena MD   Primary Care Provider: Ninfa Hurley MD    Patient information was obtained from patient, past medical records, and ER records.     Subjective:     Chief Complaint/Reason for Admission: Abdominal pain    History of Present Illness: Rosette Wood is a 53 y.o. female with no known medical history who presented to the ED with abdominal pain, nausea, vomiting, and diarrhea. Her pain has been diffuse and started mid July. She does endorse rare hematochezia with diarrhea during this time. Most of the problems with vomiting began after starting colonoscopy prep last week. She is able to tolerate PO at this time, but this is limited by nausea and episodic emesis. Imaging obtained showed colonic intussucption at the hepatic flexure. Additionally there was a liver mass measuring 7 cm in largest dimension in the left liver and an enlarging pulmonary nodule in the right lung. She has never had a prior colonoscopy. Surgical history notable for removal of an ectopic pregnancy. She is not on any blood thinners.         No current facility-administered medications on file prior to encounter.     Current Outpatient Medications on File Prior to Encounter   Medication Sig    AMOXICILLIN ORAL Take by mouth 2 (two) times a day.    dicyclomine (BENTYL) 10 MG capsule Take 1 capsule (10 mg total) by mouth 4 (four) times daily before meals and nightly.    metroNIDAZOLE (FLAGYL) 500 MG tablet Take 1 tablet (500 mg total) by mouth 2 (two) times a day.    ondansetron (ZOFRAN-ODT) 4 MG TbDL Take 1 tablet (4 mg total) by mouth every 8 (eight) hours as needed (Nausea).    orphenadrine (NORFLEX) 100 mg tablet Take 1 tablet (100 mg total) by mouth nightly as needed (muscle cramps).    acetaminophen (TYLENOL) 500 MG tablet Take 1 tablet (500 mg total) by mouth every 6  (six) hours as needed for Pain. (Patient not taking: Reported on 7/19/2024)    bacitracin 500 unit/gram Oint Apply topically 2 (two) times daily. (Patient not taking: Reported on 7/29/2024)    ferrous sulfate 325 (65 FE) MG EC tablet Take 1 tablet (325 mg total) by mouth 3 (three) times daily with meals. (Patient not taking: Reported on 7/19/2024)    ibuprofen (ADVIL,MOTRIN) 800 MG tablet Take 1 tablet (800 mg total) by mouth every 6 (six) hours as needed for Pain. (Patient not taking: Reported on 7/19/2024)    naproxen (NAPROSYN) 500 MG tablet Take 1 tablet (500 mg total) by mouth 2 (two) times daily with meals. (Patient not taking: Reported on 7/19/2024)       Review of patient's allergies indicates:  No Known Allergies    No past medical history on file.  No past surgical history on file.  Family History    None       Tobacco Use    Smoking status: Never    Smokeless tobacco: Not on file   Substance and Sexual Activity    Alcohol use: Not on file    Drug use: Not on file    Sexual activity: Not on file     Review of Systems   Constitutional:  Negative for chills and fever.   Respiratory:  Negative for shortness of breath.    Cardiovascular:  Negative for chest pain.   Gastrointestinal:  Positive for abdominal pain, diarrhea, nausea and vomiting. Negative for abdominal distention.   Genitourinary:  Negative for difficulty urinating and dysuria.     Objective:     Vital Signs (Most Recent):  Temp: 98.2 °F (36.8 °C) (08/06/24 0726)  Pulse: 91 (08/06/24 0726)  Resp: 14 (08/06/24 0726)  BP: (!) 182/92 (08/06/24 0726)  SpO2: 97 % (08/06/24 0759) Vital Signs (24h Range):  Temp:  [98.2 °F (36.8 °C)-99.5 °F (37.5 °C)] 98.2 °F (36.8 °C)  Pulse:  [] 91  Resp:  [14-19] 14  SpO2:  [80 %-100 %] 97 %  BP: (138-182)/() 182/92     Weight: 42.5 kg (93 lb 11.1 oz)  Body mass index is 17.7 kg/m².     Physical Exam  Vitals and nursing note reviewed.   Constitutional:       General: She is not in acute distress.      Appearance: She is not toxic-appearing.   HENT:      Head: Normocephalic and atraumatic.   Eyes:      General: No scleral icterus.     Extraocular Movements: Extraocular movements intact.   Cardiovascular:      Rate and Rhythm: Normal rate and regular rhythm.   Pulmonary:      Effort: Pulmonary effort is normal. No respiratory distress.   Abdominal:      Palpations: Abdomen is soft.      Tenderness: There is no guarding or rebound.      Comments: Palpable mass in the RUQ.   Diffuse, mild tenderness to palpation.    Skin:     General: Skin is warm and dry.   Neurological:      General: No focal deficit present.      Mental Status: She is alert and oriented to person, place, and time.            I have reviewed all pertinent lab results within the past 24 hours.    Significant Diagnostics:  I have reviewed all pertinent imaging results/findings within the past 24 hours.    Assessment/Plan:     * Colonic intussusception  Rosette Wood is a 53 y.o. female who presents with abdominal pain with associated diarrhea that has now evolved into PO intolerance with frequent emesis. Imaging demonstrated colonic intussusception at the hepatic flexure which also appears to be present on a non-con scan ordered two weeks ago but was not commented on in the final read. She clearly has one large lesion in the liver and in the context of colonic intussuception is likely reflective of a primary colon cancer leading to the intussusception with hepatic metastasis and possibly pulmonary metastasis as well. On my review there appear to be at least two other hepatic lesions. Ms. Wood is at least partially obstructed at present and will likely need an operation.     -Will discuss with radiology for further characterization of the other hepatic lesions  -Continue NPO  -mIVF  -Follow up CEA  -Daily labs  -DVT ppx  -Tentatively planning for exploratory laparotomy this afternoon pending discussion with radiology      VTE Risk Mitigation (From  admission, onward)           Ordered     IP VTE HIGH RISK PATIENT  Once         08/05/24 2048     Place sequential compression device  Until discontinued         08/05/24 2048                    Fahad Mcdonald MD  General Surgery  [facility]

## 2024-08-06 NOTE — OP NOTE
[facility]  General Surgery  Operative Note    SUMMARY     Date of Procedure: 8/6/2024     Procedure: Procedure(s) (LRB):  LAPAROTOMY, EXPLORATORY (N/A)  BIOPSY, LIVER (Left)  COLECTOMY, RIGHT (Right)  CREATION, ILEOSTOMY       Surgeons and Role:     * Pedro Saxena MD - Primary    Assisting Surgeon: Fahad ward PGY4    Pre-Operative Diagnosis: Intussusception [K56.1]    Post-Operative Diagnosis: Post-Op Diagnosis Codes:     * Intussusception [K56.1]    Anesthesia: General    Operative Findings (including complications, if any):   Large left liver mass - biopsied  Large hepatic flexure colon mass with short intussusecption  Right hemicolectomy  Creation of end ileostomy  No peritoneal or omental masses.  Small bowel normal  Remainder of colon without apparent mass  Exparel valeriy TAP plane      Description of Technical Procedures: Brought into the OR and placed supine. She was given GETA. A vertical mildine incision was made. Cautery was used to dissect down to the fascia. The abdomen was entered. There was some serous fluid noted. No blood or bowel contents were noted in the abdomen. The colon was inspected. There was a large mobile mass a the hepatic flexure with short intussusception. No evidence of necrosis or perforation. The remainder of the colon was without apparent mass. The appendix was normal. The stomach and small bowel was normal.   There was a large left liver mass. This was biopsied several times. No other palpable liver nodules were noted. No peritoneal or omental lesions were noted. The right colon was reflected medially by taking down the line of toldt. The hepatic flexure was taken down and the tranverse colon was mobilized to about the midline, well distal to the mass. The ileum was divided about 10cm proximal to the IC valve using a LOLITA stapler. The right ureter was identified. The right colic and ileocolic vessels were identified and ligated using silk sutures. The right branch of  the middle colic vessels were identified and highly ligated using silk sutures. The colon was divided at about the midportion of the transverse colon. The remaining medial attachments were taken down using ligasure and the right colon was sent for pathology.  Given her nutritional status she was deemed high risk for anastomotic breakdown and the decision was made to perform an end ileostomy.   Exparel was injected in the bilateral TAP plane.   A circular incision was made in the skin in the right abdomen. A cruciate incision was made through the rectus sheath and the ileum brought through. The fascia was closed using running 1 PDS. Skin was closed using staples. The ileostomy was matured using 3-0 vicryl sutures and appliance was attached. She tolerated the procedure well.     Significant Surgical Tasks Conducted by the Assistant(s), if Applicable:     Estimated Blood Loss (EBL): 200 mL           Implants: * No implants in log *    Specimens:   Specimen (24h ago, onward)       Start     Ordered    08/06/24 1542  Specimen to Pathology, Surgery General Surgery  Once        Comments: Pre-op Diagnosis: Intussusception [K56.1]Procedure(s):LAPAROTOMY, EXPLORATORY Number of specimens: 2Name of specimens: #1 left liver mass biopsy; #2 right colon     References:    Click here for ordering Quick Tip   Question Answer Comment   Procedure Type: General Surgery    Release to patient Immediate        08/06/24 1633                            Condition: Stable    Disposition: PACU - hemodynamically stable.    Attestation: I was present and scrubbed for the entire procedure.

## 2024-08-06 NOTE — HPI
Rosette Wood is a 53 y.o. female with no known medical history who presented to the ED with abdominal pain, nausea, vomiting, and diarrhea. Her pain has been diffuse and started mid July. She does endorse rare hematochezia with diarrhea during this time. Most of the problems with vomiting began after starting colonoscopy prep last week. She is able to tolerate PO at this time, but this is limited by nausea and episodic emesis. Imaging obtained showed colonic intussucption at the hepatic flexure. Additionally there was a liver mass measuring 7 cm in largest dimension in the left liver and an enlarging pulmonary nodule in the right lung. She has never had a prior colonoscopy. Surgical history notable for removal of an ectopic pregnancy. She is not on any blood thinners.

## 2024-08-06 NOTE — SUBJECTIVE & OBJECTIVE
No current facility-administered medications on file prior to encounter.     Current Outpatient Medications on File Prior to Encounter   Medication Sig    AMOXICILLIN ORAL Take by mouth 2 (two) times a day.    dicyclomine (BENTYL) 10 MG capsule Take 1 capsule (10 mg total) by mouth 4 (four) times daily before meals and nightly.    metroNIDAZOLE (FLAGYL) 500 MG tablet Take 1 tablet (500 mg total) by mouth 2 (two) times a day.    ondansetron (ZOFRAN-ODT) 4 MG TbDL Take 1 tablet (4 mg total) by mouth every 8 (eight) hours as needed (Nausea).    orphenadrine (NORFLEX) 100 mg tablet Take 1 tablet (100 mg total) by mouth nightly as needed (muscle cramps).    acetaminophen (TYLENOL) 500 MG tablet Take 1 tablet (500 mg total) by mouth every 6 (six) hours as needed for Pain. (Patient not taking: Reported on 7/19/2024)    bacitracin 500 unit/gram Oint Apply topically 2 (two) times daily. (Patient not taking: Reported on 7/29/2024)    ferrous sulfate 325 (65 FE) MG EC tablet Take 1 tablet (325 mg total) by mouth 3 (three) times daily with meals. (Patient not taking: Reported on 7/19/2024)    ibuprofen (ADVIL,MOTRIN) 800 MG tablet Take 1 tablet (800 mg total) by mouth every 6 (six) hours as needed for Pain. (Patient not taking: Reported on 7/19/2024)    naproxen (NAPROSYN) 500 MG tablet Take 1 tablet (500 mg total) by mouth 2 (two) times daily with meals. (Patient not taking: Reported on 7/19/2024)       Review of patient's allergies indicates:  No Known Allergies    No past medical history on file.  No past surgical history on file.  Family History    None       Tobacco Use    Smoking status: Never    Smokeless tobacco: Not on file   Substance and Sexual Activity    Alcohol use: Not on file    Drug use: Not on file    Sexual activity: Not on file     Review of Systems   Constitutional:  Negative for chills and fever.   Respiratory:  Negative for shortness of breath.    Cardiovascular:  Negative for chest pain.    Gastrointestinal:  Positive for abdominal pain, diarrhea, nausea and vomiting. Negative for abdominal distention.   Genitourinary:  Negative for difficulty urinating and dysuria.     Objective:     Vital Signs (Most Recent):  Temp: 98.2 °F (36.8 °C) (08/06/24 0726)  Pulse: 91 (08/06/24 0726)  Resp: 14 (08/06/24 0726)  BP: (!) 182/92 (08/06/24 0726)  SpO2: 97 % (08/06/24 0759) Vital Signs (24h Range):  Temp:  [98.2 °F (36.8 °C)-99.5 °F (37.5 °C)] 98.2 °F (36.8 °C)  Pulse:  [] 91  Resp:  [14-19] 14  SpO2:  [80 %-100 %] 97 %  BP: (138-182)/() 182/92     Weight: 42.5 kg (93 lb 11.1 oz)  Body mass index is 17.7 kg/m².     Physical Exam  Vitals and nursing note reviewed.   Constitutional:       General: She is not in acute distress.     Appearance: She is not toxic-appearing.   HENT:      Head: Normocephalic and atraumatic.   Eyes:      General: No scleral icterus.     Extraocular Movements: Extraocular movements intact.   Cardiovascular:      Rate and Rhythm: Normal rate and regular rhythm.   Pulmonary:      Effort: Pulmonary effort is normal. No respiratory distress.   Abdominal:      Palpations: Abdomen is soft.      Tenderness: There is no guarding or rebound.      Comments: Palpable mass in the RUQ.   Diffuse, mild tenderness to palpation.    Skin:     General: Skin is warm and dry.   Neurological:      General: No focal deficit present.      Mental Status: She is alert and oriented to person, place, and time.            I have reviewed all pertinent lab results within the past 24 hours.    Significant Diagnostics:  I have reviewed all pertinent imaging results/findings within the past 24 hours.

## 2024-08-06 NOTE — ASSESSMENT & PLAN NOTE
Rosette Wood is a 53 y.o. female who presents with abdominal pain with associated diarrhea that has now evolved into PO intolerance with frequent emesis. Imaging demonstrated colonic intussusception at the hepatic flexure which also appears to be present on a non-con scan ordered two weeks ago but was not commented on in the final read. She clearly has one large lesion in the liver and in the context of colonic intussuception is likely reflective of a primary colon cancer leading to the intussusception with hepatic metastasis and possibly pulmonary metastasis as well. On my review there appear to be at least two other hepatic lesions. Ms. Wood is at least partially obstructed at present and will likely need an operation.     -Will discuss with radiology for further characterization of the other hepatic lesions  -Continue NPO  -mIVF  -Follow up CEA  -Daily labs  -DVT ppx  -Tentatively planning for exploratory laparotomy this afternoon pending discussion with radiology

## 2024-08-06 NOTE — ED NOTES
Pt appears resting on stretcher. No needs identified at this time. VSS. Side rails up x2. Call bell within reach.

## 2024-08-06 NOTE — PROGRESS NOTES
08/06/24 0000   Admission   Initial VN Admission Questions Complete   Shift   Pain Management Interventions pain management plan reviewed with patient/caregiver   Virtual Nurse - Patient Verbalized Approval Of Camera Use;VN Rounding   Safety/Activity   Patient Rounds bed in low position;call light in patient/parent reach;clutter free environment maintained;visualized patient;placement of personal items at bedside   Safety Promotion/Fall Prevention assistive device/personal item within reach;Fall Risk reviewed with patient/family;side rails raised x 2   Positioning   Body Position supine   Head of Bed (HOB) Positioning HOB at 30-45 degrees     VN cued in to pt's room with permission. Admission questions completed. Plan of care reviewed with pt. Pt denies any questions or needs at this time. Call bell w/in reach. Instructed to call for needs/assist oob.

## 2024-08-07 LAB
ALBUMIN SERPL BCP-MCNC: 2.6 G/DL (ref 3.5–5.2)
ANION GAP SERPL CALC-SCNC: 10 MMOL/L (ref 8–16)
ANISOCYTOSIS BLD QL SMEAR: SLIGHT
BASOPHILS # BLD AUTO: 0.07 K/UL (ref 0–0.2)
BASOPHILS NFR BLD: 0.3 % (ref 0–1.9)
BUN SERPL-MCNC: 5 MG/DL (ref 6–20)
BURR CELLS BLD QL SMEAR: ABNORMAL
CALCIUM SERPL-MCNC: 8.6 MG/DL (ref 8.7–10.5)
CHLORIDE SERPL-SCNC: 102 MMOL/L (ref 95–110)
CO2 SERPL-SCNC: 24 MMOL/L (ref 23–29)
CREAT SERPL-MCNC: 0.6 MG/DL (ref 0.5–1.4)
DIFFERENTIAL METHOD BLD: ABNORMAL
EOSINOPHIL # BLD AUTO: 0 K/UL (ref 0–0.5)
EOSINOPHIL NFR BLD: 0 % (ref 0–8)
ERYTHROCYTE [DISTWIDTH] IN BLOOD BY AUTOMATED COUNT: 29.3 % (ref 11.5–14.5)
EST. GFR  (NO RACE VARIABLE): >60 ML/MIN/1.73 M^2
GLUCOSE SERPL-MCNC: 123 MG/DL (ref 70–110)
HCT VFR BLD AUTO: 32.4 % (ref 37–48.5)
HGB BLD-MCNC: 10 G/DL (ref 12–16)
HYPOCHROMIA BLD QL SMEAR: ABNORMAL
IMM GRANULOCYTES # BLD AUTO: 0.17 K/UL (ref 0–0.04)
IMM GRANULOCYTES NFR BLD AUTO: 0.7 % (ref 0–0.5)
LYMPHOCYTES # BLD AUTO: 1.6 K/UL (ref 1–4.8)
LYMPHOCYTES NFR BLD: 6.6 % (ref 18–48)
MAGNESIUM SERPL-MCNC: 1.6 MG/DL (ref 1.6–2.6)
MCH RBC QN AUTO: 18.8 PG (ref 27–31)
MCHC RBC AUTO-ENTMCNC: 30.9 G/DL (ref 32–36)
MCV RBC AUTO: 61 FL (ref 82–98)
MONOCYTES # BLD AUTO: 1.4 K/UL (ref 0.3–1)
MONOCYTES NFR BLD: 5.6 % (ref 4–15)
NEUTROPHILS # BLD AUTO: 21.6 K/UL (ref 1.8–7.7)
NEUTROPHILS NFR BLD: 86.8 % (ref 38–73)
NRBC BLD-RTO: 0 /100 WBC
PHOSPHATE SERPL-MCNC: 3.2 MG/DL (ref 2.7–4.5)
PLATELET # BLD AUTO: 747 K/UL (ref 150–450)
PLATELET BLD QL SMEAR: ABNORMAL
PMV BLD AUTO: 9.3 FL (ref 9.2–12.9)
POIKILOCYTOSIS BLD QL SMEAR: ABNORMAL
POLYCHROMASIA BLD QL SMEAR: ABNORMAL
POTASSIUM SERPL-SCNC: 3.5 MMOL/L (ref 3.5–5.1)
RBC # BLD AUTO: 5.33 M/UL (ref 4–5.4)
SODIUM SERPL-SCNC: 136 MMOL/L (ref 136–145)
TARGETS BLD QL SMEAR: ABNORMAL
WBC # BLD AUTO: 24.88 K/UL (ref 3.9–12.7)

## 2024-08-07 PROCEDURE — 11000001 HC ACUTE MED/SURG PRIVATE ROOM

## 2024-08-07 PROCEDURE — 25000003 PHARM REV CODE 250: Performed by: EMERGENCY MEDICINE

## 2024-08-07 PROCEDURE — 97161 PT EVAL LOW COMPLEX 20 MIN: CPT

## 2024-08-07 PROCEDURE — 25000003 PHARM REV CODE 250

## 2024-08-07 PROCEDURE — 63600175 PHARM REV CODE 636 W HCPCS

## 2024-08-07 PROCEDURE — 63600175 PHARM REV CODE 636 W HCPCS: Performed by: STUDENT IN AN ORGANIZED HEALTH CARE EDUCATION/TRAINING PROGRAM

## 2024-08-07 PROCEDURE — 25000003 PHARM REV CODE 250: Performed by: STUDENT IN AN ORGANIZED HEALTH CARE EDUCATION/TRAINING PROGRAM

## 2024-08-07 PROCEDURE — 36415 COLL VENOUS BLD VENIPUNCTURE: CPT | Performed by: STUDENT IN AN ORGANIZED HEALTH CARE EDUCATION/TRAINING PROGRAM

## 2024-08-07 PROCEDURE — 97530 THERAPEUTIC ACTIVITIES: CPT

## 2024-08-07 PROCEDURE — 85025 COMPLETE CBC W/AUTO DIFF WBC: CPT | Performed by: STUDENT IN AN ORGANIZED HEALTH CARE EDUCATION/TRAINING PROGRAM

## 2024-08-07 PROCEDURE — 36415 COLL VENOUS BLD VENIPUNCTURE: CPT

## 2024-08-07 PROCEDURE — 83735 ASSAY OF MAGNESIUM: CPT

## 2024-08-07 PROCEDURE — 80069 RENAL FUNCTION PANEL: CPT

## 2024-08-07 PROCEDURE — 97535 SELF CARE MNGMENT TRAINING: CPT

## 2024-08-07 RX ORDER — ACETAMINOPHEN 325 MG/1
650 TABLET ORAL EVERY 8 HOURS
Status: DISCONTINUED | OUTPATIENT
Start: 2024-08-07 | End: 2024-08-13 | Stop reason: HOSPADM

## 2024-08-07 RX ORDER — MAGNESIUM SULFATE HEPTAHYDRATE 40 MG/ML
2 INJECTION, SOLUTION INTRAVENOUS ONCE
Status: COMPLETED | OUTPATIENT
Start: 2024-08-07 | End: 2024-08-07

## 2024-08-07 RX ORDER — OXYCODONE HYDROCHLORIDE 5 MG/1
5 TABLET ORAL EVERY 4 HOURS PRN
Status: DISCONTINUED | OUTPATIENT
Start: 2024-08-07 | End: 2024-08-13 | Stop reason: HOSPADM

## 2024-08-07 RX ADMIN — MORPHINE SULFATE 2 MG: 2 INJECTION, SOLUTION INTRAMUSCULAR; INTRAVENOUS at 02:08

## 2024-08-07 RX ADMIN — ACETAMINOPHEN 650 MG: 325 TABLET ORAL at 12:08

## 2024-08-07 RX ADMIN — HYDRALAZINE HYDROCHLORIDE 10 MG: 20 INJECTION, SOLUTION INTRAMUSCULAR; INTRAVENOUS at 06:08

## 2024-08-07 RX ADMIN — HYDRALAZINE HYDROCHLORIDE 10 MG: 20 INJECTION, SOLUTION INTRAMUSCULAR; INTRAVENOUS at 08:08

## 2024-08-07 RX ADMIN — POTASSIUM BICARBONATE 25 MEQ: 978 TABLET, EFFERVESCENT ORAL at 12:08

## 2024-08-07 RX ADMIN — LABETALOL HYDROCHLORIDE 10 MG: 5 INJECTION INTRAVENOUS at 07:08

## 2024-08-07 RX ADMIN — OXYCODONE 5 MG: 5 TABLET ORAL at 07:08

## 2024-08-07 RX ADMIN — OXYCODONE 5 MG: 5 TABLET ORAL at 11:08

## 2024-08-07 RX ADMIN — MORPHINE SULFATE 2 MG: 2 INJECTION, SOLUTION INTRAMUSCULAR; INTRAVENOUS at 12:08

## 2024-08-07 RX ADMIN — MORPHINE SULFATE 2 MG: 2 INJECTION, SOLUTION INTRAMUSCULAR; INTRAVENOUS at 10:08

## 2024-08-07 RX ADMIN — HYDROCODONE BITARTRATE AND ACETAMINOPHEN 1 TABLET: 5; 325 TABLET ORAL at 06:08

## 2024-08-07 RX ADMIN — MELATONIN TAB 3 MG 6 MG: 3 TAB at 10:08

## 2024-08-07 RX ADMIN — DEXTROSE, SODIUM CHLORIDE, AND POTASSIUM CHLORIDE: 5; .45; .15 INJECTION INTRAVENOUS at 05:08

## 2024-08-07 RX ADMIN — ACETAMINOPHEN 650 MG: 325 TABLET ORAL at 10:08

## 2024-08-07 RX ADMIN — MAGNESIUM SULFATE IN WATER 2 G: 40 INJECTION, SOLUTION INTRAVENOUS at 12:08

## 2024-08-07 RX ADMIN — MORPHINE SULFATE 2 MG: 2 INJECTION, SOLUTION INTRAMUSCULAR; INTRAVENOUS at 08:08

## 2024-08-07 RX ADMIN — MORPHINE SULFATE 2 MG: 2 INJECTION, SOLUTION INTRAMUSCULAR; INTRAVENOUS at 06:08

## 2024-08-07 RX ADMIN — DEXTROSE, SODIUM CHLORIDE, AND POTASSIUM CHLORIDE: 5; .45; .15 INJECTION INTRAVENOUS at 07:08

## 2024-08-07 NOTE — CONSULTS
[facility] Surg  Wound Care    Patient Name:  Rosette Wood   MRN:  [id number]  Date: 8/7/2024  Diagnosis: Colonic intussusception    History:     No past medical history on file.    Social History     Socioeconomic History    Marital status: Single   Tobacco Use    Smoking status: Never     Social Determinants of Health     Financial Resource Strain: Low Risk  (8/6/2024)    Overall Financial Resource Strain (CARDIA)     Difficulty of Paying Living Expenses: Not hard at all   Food Insecurity: No Food Insecurity (8/6/2024)    Hunger Vital Sign     Worried About Running Out of Food in the Last Year: Never true     Ran Out of Food in the Last Year: Never true   Transportation Needs: No Transportation Needs (8/6/2024)    TRANSPORTATION NEEDS     Transportation : No   Physical Activity: Sufficiently Active (8/6/2024)    Exercise Vital Sign     Days of Exercise per Week: 5 days     Minutes of Exercise per Session: 60 min   Stress: No Stress Concern Present (8/6/2024)    [employer] - Occupational Stress Questionnaire     Feeling of Stress : Only a little   Housing Stability: Low Risk  (8/6/2024)    Housing Stability Vital Sign     Unable to Pay for Housing in the Last Year: No     Homeless in the Last Year: No       Precautions:     Allergies as of 08/05/2024    (No Known Allergies)       WO Assessment Details/Treatment     New ostomy consult- Met with pt and daughter at bedside- discussed ostomy care:  Pouch emptying, sizing/cuting pouch, and applying pouch  Stoma and nano-stomal care  Food choices and hydration  Obtaining supplies  Discussed and demonstrated cutting ostomy pouch and emptying pouch of stool and gas.  Patient returned demonstration of opening and closing pouch without difficulty. Discussed meal planning with particular foods to avoid- will request RD consult.  Discussed importance of hydration and increasing fluid intake.  Explained process of ordering supplies to be  delivered to patients home and pt plans for home health.  Pt in agreement to begin participating in her care during her admission- notified nurse and plan to have pt change pouch tomorrow. Ordered starter kits from [employer] [employer] [facility].    08/07/2024

## 2024-08-07 NOTE — PT/OT/SLP PROGRESS
Occupational Therapy      Patient Name:  Rosette Wood   MRN:  [id number]    Patient not seen today secondary to Pain (Pt declined citing pain. She stated that she just got pain meds and wanted to rest. Also stated she worked with PT just prior to OT visit.).pt agreeable to working with OT tomorrow. Will follow-up 8/8/2024.    8/7/2024

## 2024-08-07 NOTE — PT/OT/SLP EVAL
Physical Therapy Evaluation/Treatment    Patient Name:  Rosette Wood   MRN:  [id number]    Recommendations:     Discharge Recommendations: Low Intensity Therapy   Discharge Equipment Recommendations: walker, rolling (may assist patient in her recovery to provide support, decrease pain)   Barriers to discharge: home accessibility challenges and Decreased caregiver support    Assessment:     Rosette Wood is a 53 y.o. female admitted with a medical diagnosis of Colonic intussusception.  She presents with the following impairments/functional limitations: impaired endurance, impaired self care skills, impaired functional mobility, gait instability, impaired balance, pain, impaired skin Patient evaluated to patient tolerance as initially, patient did not want to participate; fatigued following being up all day visiting with family; assisted patient from bed to bathroom and back; pt with 8/10 pain in abdomen; received pain meds just prior to therapy; education in role of PT/POC, increasing mobility with healing, posture and restrictions; patient is at a CG/Georges level with bed mobility, CGA with amb; will benefit from cont acute PT services to advance pt to Sue level. .    Rehab Prognosis: Good; patient would benefit from acute skilled PT services to address these deficits and reach maximum level of function.    Recent Surgery: Procedure(s) (LRB):  LAPAROTOMY, EXPLORATORY (N/A)  BIOPSY, LIVER (Left)  COLECTOMY, RIGHT (Right)  CREATION, ILEOSTOMY 1 Day Post-Op    Plan:     During this hospitalization, patient to be seen 5 x/week to address the identified rehab impairments via gait training, therapeutic activities, therapeutic exercises, neuromuscular re-education and progress toward the following goals:    Plan of Care Expires:  09/07/24    Subjective     Chief Complaint: tired  Patient/Family Comments/goals: to return to PLOF  Pain/Comfort:  Pain Rating 1: 8/10  Location - Orientation 1: generalized  Location 1:  abdomen  Pain Addressed 1: Pre-medicate for activity, Reposition, Distraction, Cessation of Activity, Nurse notified  Pain Rating Post-Intervention 1: 8/10    Patients cultural, spiritual, Mu-ism conflicts given the current situation: no    Living Environment:  Patient lives alone in a 2nd floor apartment with flight of stairs to enter with L handrail; tub/shower combo  Prior to admission, patients level of function was independent, working; does not drive; friend  her where she needs to go.  Equipment used at home: none.  Upon discharge, patient will have limited assistance from neighbors, friend, family as able (dtr lives 3 hours away)    Objective:     Communicated with nurse prior to session.  Patient found with bed in chair position with peripheral IV  upon PT entry to room.    General Precautions: Standard, fall  Orthopedic Precautions:N/A (abdominal sx- no lifting >10lbs)   Braces: N/A  Respiratory Status: Room air    Exams:  Cognitive Exam:  Patient is oriented to Person, Place, Time, Situation, and follows multistep commands  Gross Motor Coordination:  reduced speed  Postural Exam:  Patient presented with the following abnormalities:    -       Rounded shoulders  -       Flexed posture  Sensation:    -       Intact  Skin Integrity/Edema:      -       Skin integrity: midline incision bandaged; R colectomy , ileostomy present  RLE ROM: WFL  RLE Strength: WFL  LLE ROM: WFL  LLE Strength: WFL    Functional Mobility:  Bed Mobility:     Rolling Left:  contact guard assistance and VCs for technique  Scooting: stand by assistance to EOB  Supine to Sit: stand by assistance, contact guard assistance, and increased time/effort and VCs for technique  Sit to Supine: contact guard assistance and minimal assistance and VCs for technique  Transfers:     Sit to Stand:  contact guard assistance with no AD  Toilet Transfer: contact guard assistance with  no AD  using  Step Transfer  Gait: Patient amb bed to toilet  with CGA, slow gait with short step length, decreased fluidity of movement, minimally flexed posture with therapist pushing IV pole; from toilet to bed, pt amb while pushing the IV pole herself and using it for support with SBA/CGA; VCs given for more erect posture  Balance: sit static/dynamic~good; stand static~fair+, stand dynamic/amb~fair      AM-PAC 6 CLICK MOBILITY  Total Score:18       Treatment & Education:  -Patient educated on role of PT/POC, on log roll technique for performing sup to sit, in not lifting >10lbs, healing of incision and need for erect posture in keeping anterior muscles at optimal length  -Patient performed activities as described above and returned to supine in bed  -Patient educated on APs for DVT prophylaxis, heel slides with patient demonstrating ability; also educated in UE reaches to above head as able; therex to be done intermittently 5-10 reps, APs 20 every hour    Patient left HOB elevated with all lines intact, call button in reach, bed alarm on, and nurse notified.    GOALS:   Multidisciplinary Problems       Physical Therapy Goals          Problem: Physical Therapy    Goal Priority Disciplines Outcome Goal Variances Interventions   Physical Therapy Goal     PT, PT/OT Progressing     Description: Goals to be met by: 2024     Patient will increase functional independence with mobility by performin. Supine to sit with Modified [place]  2. Sit to supine with Modified [place]  3. Rolling with Modified [place].  4. Sit to stand transfer with Modified [place] with Rolling Walker as needed  5. Bed to chair transfer with Modified [place] using Rolling Walker as needed  6. Gait  x 200 feet with Modified [place] using Rolling Walker as needed   7. Ascend/descend 1 flight of stairs with left Handrails Modified Harvey   8. Lower extremity exercise program x10 reps with independence                         History:     No past medical history on  file.    Past Surgical History:   Procedure Laterality Date    COLECTOMY, RIGHT Right 8/6/2024    Procedure: COLECTOMY, RIGHT;  Surgeon: Pedro Saxena MD;  Location: [facility] OR;  Service: General;  Laterality: Right;    ILEOSTOMY  8/6/2024    Procedure: CREATION, ILEOSTOMY;  Surgeon: Pedro Saxena MD;  Location: [facility] OR;  Service: General;;    LAPAROTOMY, EXPLORATORY N/A 8/6/2024    Procedure: LAPAROTOMY, EXPLORATORY;  Surgeon: Pedro Saxena MD;  Location: [facility] OR;  Service: General;  Laterality: N/A;    LIVER BIOPSY Left 8/6/2024    Procedure: BIOPSY, LIVER;  Surgeon: Pedro Saxena MD;  Location: [facility];  Service: General;  Laterality: Left;  left liver mass biopsy       Time Tracking:     PT Received On: 08/07/24  PT Start Time: 1452     PT Stop Time: 1530  PT Total Time (min): 38 min     Billable Minutes: Evaluation 12 and Therapeutic Activity 13  Self Care 13      08/07/2024

## 2024-08-07 NOTE — NURSING
Pt placed on continuous cardiac monitoring. PRN hydralazine administered per MAR. BP obtained and recorded. Pt tolerated well.

## 2024-08-07 NOTE — ASSESSMENT & PLAN NOTE
Rosette Wood is a 53 y.o. female who presents with abdominal pain with associated diarrhea that has now evolved into PO intolerance with frequent emesis. Imaging demonstrated colonic intussusception at the hepatic flexure which also appears to be present on a non-con scan ordered two weeks ago but was not commented on in the final read. She clearly has one large lesion in the liver and in the context of colonic intussuception is likely reflective of a primary colon cancer leading to the intussusception with hepatic metastasis and possibly pulmonary metastasis as well. On my review there appear to be at least two other hepatic lesions. Ms. Wood presenting at least partially obstructed and required operation. S/p open extended R colectomy with end ileostomy and biopsy of L liver lesion on 8/6    -Advance to FLD  -mIVF decrease to 50ml/hr  -Follow up liver biopsies  -CEA <1.7  -PT/OT  -MMPC  -Daily labs  -DVT ppx

## 2024-08-07 NOTE — PLAN OF CARE
Problem: Physical Therapy  Goal: Physical Therapy Goal  Description: Goals to be met by: 2024     Patient will increase functional independence with mobility by performin. Supine to sit with Modified [place]  2. Sit to supine with Modified [place]  3. Rolling with Modified [place].  4. Sit to stand transfer with Modified [place] with Rolling Walker as needed  5. Bed to chair transfer with Modified [place] using Rolling Walker as needed  6. Gait  x 200 feet with Modified [place] using Rolling Walker as needed   7. Ascend/descend 1 flight of stairs with left Handrails Modified [place]   8. Lower extremity exercise program x10 reps with independence    Outcome: Progressing   Patient evaluated to patient tolerance as initially, patient did not want to participate; fatigued following being up all day visiting with family; assisted patient from bed to bathroom and back; pt with 8/10 pain in abdomen; received pain meds just prior to therapy; education in role of PT/POC, increasing mobility with healing, posture and restrictions; patient is at a CG/Georges level with bed mobility, CGA with amb; will benefit from cont acute PT services to advance pt to Sue level.

## 2024-08-07 NOTE — NURSING
Pt c/o incisional pain 8/10 after ambulating to bathroom. Pt requesting pain medication, medication administered per MAR.

## 2024-08-07 NOTE — PLAN OF CARE
Plan of care reviewed with patient and family. Both verbalized understanding. Medicated per MAR. Colostomy care taught by wound care nurse. Instructed to use call light for assistance. Call light in reach.

## 2024-08-07 NOTE — PROGRESS NOTES
[facility] Surg  General Surgery  Progress Note    Subjective:     History of Present Illness:  Rosette Wood is a 53 y.o. female with no known medical history who presented to the ED with abdominal pain, nausea, vomiting, and diarrhea. Her pain has been diffuse and started mid July. She does endorse rare hematochezia with diarrhea during this time. Most of the problems with vomiting began after starting colonoscopy prep last week. She is able to tolerate PO at this time, but this is limited by nausea and episodic emesis. Imaging obtained showed colonic intussucption at the hepatic flexure. Additionally there was a liver mass measuring 7 cm in largest dimension in the left liver and an enlarging pulmonary nodule in the right lung. She has never had a prior colonoscopy. Surgical history notable for removal of an ectopic pregnancy. She is not on any blood thinners.         Post-Op Info:  Procedure(s) (LRB):  LAPAROTOMY, EXPLORATORY (N/A)  BIOPSY, LIVER (Left)  COLECTOMY, RIGHT (Right)  CREATION, ILEOSTOMY   1 Day Post-Op     Interval History: NAEON. Pain well controlled. Tolerating CLD without issue. Ostomy with minimal output    Medications:  Continuous Infusions:   dextrose 5 % and 0.45 % NaCl with KCl 20 mEq   Intravenous Continuous 100 mL/hr at 08/07/24 0649 Rate Verify at 08/07/24 0649     Scheduled Meds:  PRN Meds:  Current Facility-Administered Medications:     0.9%  NaCl infusion (for blood administration), , Intravenous, Q24H PRN    0.9%  NaCl infusion (for blood administration), , Intravenous, Q24H PRN    dextrose 10%, 12.5 g, Intravenous, PRN    dextrose 10%, 25 g, Intravenous, PRN    hydrALAZINE, 10 mg, Intravenous, Q6H PRN    HYDROcodone-acetaminophen, 1 tablet, Oral, Q4H PRN    labetalol, 10 mg, Intravenous, Q6H PRN    melatonin, 6 mg, Oral, Nightly PRN    morphine, 2 mg, Intravenous, Q4H PRN    ondansetron, 4 mg, Intravenous, Q6H PRN    sodium chloride 0.9%, 10 mL, Intravenous, PRN     Review of  patient's allergies indicates:  No Known Allergies  Objective:     Vital Signs (Most Recent):  Temp: 98.4 °F (36.9 °C) (08/07/24 0745)  Pulse: 102 (08/07/24 0745)  Resp: 18 (08/07/24 0840)  BP: 138/83 (08/07/24 0915)  SpO2: 97 % (08/07/24 0745) Vital Signs (24h Range):  Temp:  [97.5 °F (36.4 °C)-99.1 °F (37.3 °C)] 98.4 °F (36.9 °C)  Pulse:  [] 102  Resp:  [10-20] 18  SpO2:  [96 %-100 %] 97 %  BP: (110-193)/() 138/83     Weight: 50 kg (110 lb 3.7 oz)  Body mass index is 20.83 kg/m².    Intake/Output - Last 3 Shifts         08/05 0700 08/06 0659 08/06 0700 08/07 0659 08/07 0700 08/08 0659    P.O. 240      I.V. (mL/kg) 685.2 (16.1) 1453.4 (29.1)     Blood  200     IV Piggyback 303.5 2000     Total Intake(mL/kg) 1228.6 (28.9) 3653.4 (73.1)     Urine (mL/kg/hr)  550 (0.5)     Blood  200     Total Output  750     Net +1228.6 +2903.4            Urine Occurrence 2 x 3 x     Stool Occurrence 0 x 0 x              Physical Exam  Vitals and nursing note reviewed.   Constitutional:       General: She is not in acute distress.     Appearance: She is not toxic-appearing.   HENT:      Head: Normocephalic and atraumatic.   Eyes:      General: No scleral icterus.     Extraocular Movements: Extraocular movements intact.   Cardiovascular:      Rate and Rhythm: Normal rate and regular rhythm.   Pulmonary:      Effort: Pulmonary effort is normal. No respiratory distress.   Abdominal:      General: There is no distension.      Palpations: Abdomen is soft.      Tenderness: There is abdominal tenderness (appropriately). There is no guarding or rebound.      Comments: Midline incision with dressing in place, cdi  Ileostomy pink, patent, with gas and bowel sweat in bag   Skin:     General: Skin is warm and dry.   Neurological:      General: No focal deficit present.      Mental Status: She is alert and oriented to person, place, and time.          Significant Labs:  I have reviewed all pertinent lab results within the past 24  hours.    Significant Diagnostics:  I have reviewed all pertinent imaging results/findings within the past 24 hours.  Assessment/Plan:     * Colonic intussusception  Rosette Wood is a 53 y.o. female who presents with abdominal pain with associated diarrhea that has now evolved into PO intolerance with frequent emesis. Imaging demonstrated colonic intussusception at the hepatic flexure which also appears to be present on a non-con scan ordered two weeks ago but was not commented on in the final read. She clearly has one large lesion in the liver and in the context of colonic intussuception is likely reflective of a primary colon cancer leading to the intussusception with hepatic metastasis and possibly pulmonary metastasis as well. On my review there appear to be at least two other hepatic lesions. Ms. Wood presenting at least partially obstructed and required operation. S/p open extended R colectomy with end ileostomy and biopsy of L liver lesion on 8/6    -Advance to FLD  -mIVF decrease to 50ml/hr  -Follow up liver biopsies  -CEA <1.7  -PT/OT  -MMPC  -Daily labs  -DVT ppx        Edwar Singh MD  General Surgery  [facility]

## 2024-08-07 NOTE — PLAN OF CARE
Rounded at bedside. Family present. Pt is agreeable with HH services when medically ready for discharge. HH referrals sent via care port. CM will continue to follow pt throughout this hospital admit and continue to provide any discharge needs.   Future Appointments   Date Time Provider Department Center   8/21/2024  1:20 PM Pedro Saxena MD [facility]   10/8/2024  2:30 PM Sam Langford MD [facility]      08/07/24 1257   Post-Acute Status   Post-Acute Authorization Home Health   Home Health Status Referrals Sent   Coverage Medicaid   Discharge Plan   Discharge Plan A Home;Home with family;Home Health

## 2024-08-07 NOTE — SUBJECTIVE & OBJECTIVE
Interval History: NAEON. Pain well controlled. Tolerating CLD without issue. Ostomy with minimal output    Medications:  Continuous Infusions:   dextrose 5 % and 0.45 % NaCl with KCl 20 mEq   Intravenous Continuous 100 mL/hr at 08/07/24 0649 Rate Verify at 08/07/24 0649     Scheduled Meds:  PRN Meds:  Current Facility-Administered Medications:     0.9%  NaCl infusion (for blood administration), , Intravenous, Q24H PRN    0.9%  NaCl infusion (for blood administration), , Intravenous, Q24H PRN    dextrose 10%, 12.5 g, Intravenous, PRN    dextrose 10%, 25 g, Intravenous, PRN    hydrALAZINE, 10 mg, Intravenous, Q6H PRN    HYDROcodone-acetaminophen, 1 tablet, Oral, Q4H PRN    labetalol, 10 mg, Intravenous, Q6H PRN    melatonin, 6 mg, Oral, Nightly PRN    morphine, 2 mg, Intravenous, Q4H PRN    ondansetron, 4 mg, Intravenous, Q6H PRN    sodium chloride 0.9%, 10 mL, Intravenous, PRN     Review of patient's allergies indicates:  No Known Allergies  Objective:     Vital Signs (Most Recent):  Temp: 98.4 °F (36.9 °C) (08/07/24 0745)  Pulse: 102 (08/07/24 0745)  Resp: 18 (08/07/24 0840)  BP: 138/83 (08/07/24 0915)  SpO2: 97 % (08/07/24 0745) Vital Signs (24h Range):  Temp:  [97.5 °F (36.4 °C)-99.1 °F (37.3 °C)] 98.4 °F (36.9 °C)  Pulse:  [] 102  Resp:  [10-20] 18  SpO2:  [96 %-100 %] 97 %  BP: (110-193)/() 138/83     Weight: 50 kg (110 lb 3.7 oz)  Body mass index is 20.83 kg/m².    Intake/Output - Last 3 Shifts         08/05 0700 08/06 0659 08/06 0700 08/07 0659 08/07 0700 08/08 0659    P.O. 240      I.V. (mL/kg) 685.2 (16.1) 1453.4 (29.1)     Blood  200     IV Piggyback 303.5 2000     Total Intake(mL/kg) 1228.6 (28.9) 3653.4 (73.1)     Urine (mL/kg/hr)  550 (0.5)     Blood  200     Total Output  750     Net +1228.6 +2903.4            Urine Occurrence 2 x 3 x     Stool Occurrence 0 x 0 x              Physical Exam  Vitals and nursing note reviewed.   Constitutional:       General: She is not in acute distress.      Appearance: She is not toxic-appearing.   HENT:      Head: Normocephalic and atraumatic.   Eyes:      General: No scleral icterus.     Extraocular Movements: Extraocular movements intact.   Cardiovascular:      Rate and Rhythm: Normal rate and regular rhythm.   Pulmonary:      Effort: Pulmonary effort is normal. No respiratory distress.   Abdominal:      General: There is no distension.      Palpations: Abdomen is soft.      Tenderness: There is abdominal tenderness (appropriately). There is no guarding or rebound.      Comments: Midline incision with dressing in place, cdi  Ileostomy pink, patent, with gas and bowel sweat in bag   Skin:     General: Skin is warm and dry.   Neurological:      General: No focal deficit present.      Mental Status: She is alert and oriented to person, place, and time.          Significant Labs:  I have reviewed all pertinent lab results within the past 24 hours.    Significant Diagnostics:  I have reviewed all pertinent imaging results/findings within the past 24 hours.

## 2024-08-07 NOTE — PLAN OF CARE
Problem: Adult Inpatient Plan of Care  Goal: Plan of Care Review  Outcome: Progressing  Goal: Patient-Specific Goal (Individualized)  Outcome: Progressing  Goal: Absence of Hospital-Acquired Illness or Injury  Outcome: Progressing     Problem: Nausea and Vomiting  Goal: Nausea and Vomiting Relief  Outcome: Progressing     Problem: Wound  Goal: Optimal Coping  Outcome: Progressing  Goal: Optimal Functional Ability  Outcome: Progressing  Goal: Absence of Infection Signs and Symptoms  Outcome: Progressing  Goal: Optimal Pain Control and Function  Outcome: Progressing     Problem: Fall Injury Risk  Goal: Absence of Fall and Fall-Related Injury  Outcome: Progressing

## 2024-08-07 NOTE — NURSING
Pt safety maintained. IVF infusing. Medication administered per MAR. Surgical dressing CDI. Ostomy bag intact. Pt OOB, up to toilet w/staff assistance. Pt instructed to call w/any needs, verbalized understanding. Bed in lowest position, locked and bed alarms on. Call light w/in pt's reach.

## 2024-08-07 NOTE — NURSING
"Pt w/elevated BP. Pt denies ha, incisional pain. Pt states that she takes BP medication at home, but does not know the exact name of the medication. Pt states that she only takes it when her bp :"gets too high". Pt reports last taking this medication Saturday.    notified of elevated BP. Orders received for PRN medication.   "

## 2024-08-08 LAB
ALBUMIN SERPL BCP-MCNC: 2.5 G/DL (ref 3.5–5.2)
ALBUMIN SERPL BCP-MCNC: 2.5 G/DL (ref 3.5–5.2)
ALP SERPL-CCNC: 97 U/L (ref 55–135)
ALT SERPL W/O P-5'-P-CCNC: 10 U/L (ref 10–44)
ANION GAP SERPL CALC-SCNC: 12 MMOL/L (ref 8–16)
AST SERPL-CCNC: 23 U/L (ref 10–40)
BASOPHILS # BLD AUTO: 0.08 K/UL (ref 0–0.2)
BASOPHILS NFR BLD: 0.5 % (ref 0–1.9)
BILIRUB DIRECT SERPL-MCNC: 0.1 MG/DL (ref 0.1–0.3)
BILIRUB SERPL-MCNC: <0.1 MG/DL (ref 0.1–1)
BUN SERPL-MCNC: 6 MG/DL (ref 6–20)
CALCIUM SERPL-MCNC: 9.1 MG/DL (ref 8.7–10.5)
CHLORIDE SERPL-SCNC: 101 MMOL/L (ref 95–110)
CO2 SERPL-SCNC: 23 MMOL/L (ref 23–29)
CREAT SERPL-MCNC: 0.5 MG/DL (ref 0.5–1.4)
DIFFERENTIAL METHOD BLD: ABNORMAL
EOSINOPHIL # BLD AUTO: 0.1 K/UL (ref 0–0.5)
EOSINOPHIL NFR BLD: 0.8 % (ref 0–8)
ERYTHROCYTE [DISTWIDTH] IN BLOOD BY AUTOMATED COUNT: 29.8 % (ref 11.5–14.5)
EST. GFR  (NO RACE VARIABLE): >60 ML/MIN/1.73 M^2
GLUCOSE SERPL-MCNC: 106 MG/DL (ref 70–110)
HCT VFR BLD AUTO: 32 % (ref 37–48.5)
HGB BLD-MCNC: 9.8 G/DL (ref 12–16)
IMM GRANULOCYTES # BLD AUTO: 0.09 K/UL (ref 0–0.04)
IMM GRANULOCYTES NFR BLD AUTO: 0.6 % (ref 0–0.5)
LYMPHOCYTES # BLD AUTO: 1.2 K/UL (ref 1–4.8)
LYMPHOCYTES NFR BLD: 8.1 % (ref 18–48)
MAGNESIUM SERPL-MCNC: 1.9 MG/DL (ref 1.6–2.6)
MCH RBC QN AUTO: 18.6 PG (ref 27–31)
MCHC RBC AUTO-ENTMCNC: 30.6 G/DL (ref 32–36)
MCV RBC AUTO: 61 FL (ref 82–98)
MONOCYTES # BLD AUTO: 1.2 K/UL (ref 0.3–1)
MONOCYTES NFR BLD: 7.8 % (ref 4–15)
NEUTROPHILS # BLD AUTO: 12.5 K/UL (ref 1.8–7.7)
NEUTROPHILS NFR BLD: 82.2 % (ref 38–73)
NRBC BLD-RTO: 0 /100 WBC
PHOSPHATE SERPL-MCNC: 3.5 MG/DL (ref 2.7–4.5)
PLATELET # BLD AUTO: 720 K/UL (ref 150–450)
PMV BLD AUTO: 9.9 FL (ref 9.2–12.9)
POTASSIUM SERPL-SCNC: 3.8 MMOL/L (ref 3.5–5.1)
PROT SERPL-MCNC: 6.1 G/DL (ref 6–8.4)
RBC # BLD AUTO: 5.26 M/UL (ref 4–5.4)
SODIUM SERPL-SCNC: 136 MMOL/L (ref 136–145)
WBC # BLD AUTO: 15.24 K/UL (ref 3.9–12.7)

## 2024-08-08 PROCEDURE — 97165 OT EVAL LOW COMPLEX 30 MIN: CPT

## 2024-08-08 PROCEDURE — 80069 RENAL FUNCTION PANEL: CPT

## 2024-08-08 PROCEDURE — 97535 SELF CARE MNGMENT TRAINING: CPT

## 2024-08-08 PROCEDURE — 25000003 PHARM REV CODE 250: Performed by: EMERGENCY MEDICINE

## 2024-08-08 PROCEDURE — 36415 COLL VENOUS BLD VENIPUNCTURE: CPT

## 2024-08-08 PROCEDURE — 83735 ASSAY OF MAGNESIUM: CPT

## 2024-08-08 PROCEDURE — 84075 ASSAY ALKALINE PHOSPHATASE: CPT

## 2024-08-08 PROCEDURE — 21400001 HC TELEMETRY ROOM

## 2024-08-08 PROCEDURE — 25000003 PHARM REV CODE 250

## 2024-08-08 PROCEDURE — 63600175 PHARM REV CODE 636 W HCPCS

## 2024-08-08 PROCEDURE — 63600175 PHARM REV CODE 636 W HCPCS: Performed by: STUDENT IN AN ORGANIZED HEALTH CARE EDUCATION/TRAINING PROGRAM

## 2024-08-08 PROCEDURE — 85025 COMPLETE CBC W/AUTO DIFF WBC: CPT

## 2024-08-08 PROCEDURE — 97116 GAIT TRAINING THERAPY: CPT

## 2024-08-08 PROCEDURE — 97530 THERAPEUTIC ACTIVITIES: CPT

## 2024-08-08 RX ORDER — AMLODIPINE BESYLATE 5 MG/1
5 TABLET ORAL DAILY
Status: DISCONTINUED | OUTPATIENT
Start: 2024-08-08 | End: 2024-08-09

## 2024-08-08 RX ADMIN — OXYCODONE 5 MG: 5 TABLET ORAL at 10:08

## 2024-08-08 RX ADMIN — LABETALOL HYDROCHLORIDE 10 MG: 5 INJECTION INTRAVENOUS at 05:08

## 2024-08-08 RX ADMIN — AMLODIPINE BESYLATE 5 MG: 5 TABLET ORAL at 10:08

## 2024-08-08 RX ADMIN — ACETAMINOPHEN 650 MG: 325 TABLET ORAL at 08:08

## 2024-08-08 RX ADMIN — ACETAMINOPHEN 650 MG: 325 TABLET ORAL at 05:08

## 2024-08-08 RX ADMIN — OXYCODONE 5 MG: 5 TABLET ORAL at 05:08

## 2024-08-08 RX ADMIN — ACETAMINOPHEN 650 MG: 325 TABLET ORAL at 01:08

## 2024-08-08 RX ADMIN — OXYCODONE 5 MG: 5 TABLET ORAL at 04:08

## 2024-08-08 RX ADMIN — HYDRALAZINE HYDROCHLORIDE 10 MG: 20 INJECTION, SOLUTION INTRAMUSCULAR; INTRAVENOUS at 04:08

## 2024-08-08 RX ADMIN — HYDRALAZINE HYDROCHLORIDE 10 MG: 20 INJECTION, SOLUTION INTRAMUSCULAR; INTRAVENOUS at 05:08

## 2024-08-08 RX ADMIN — MELATONIN TAB 3 MG 6 MG: 3 TAB at 08:08

## 2024-08-08 RX ADMIN — MORPHINE SULFATE 2 MG: 2 INJECTION, SOLUTION INTRAMUSCULAR; INTRAVENOUS at 08:08

## 2024-08-08 RX ADMIN — MORPHINE SULFATE 2 MG: 2 INJECTION, SOLUTION INTRAMUSCULAR; INTRAVENOUS at 03:08

## 2024-08-08 NOTE — SUBJECTIVE & OBJECTIVE
Interval History: Prns for hypertension overnight, no home anti-HTN meds. Tolerating full liquid diet without issues. Still gas in ostomy bag, but no appreciable output.    Medications:  Continuous Infusions:   dextrose 5 % and 0.45 % NaCl with KCl 20 mEq   Intravenous Continuous 50 mL/hr at 08/08/24 0317 Rate Verify at 08/08/24 0317     Scheduled Meds:   acetaminophen  650 mg Oral Q8H    amLODIPine  5 mg Oral Daily     PRN Meds:  Current Facility-Administered Medications:     dextrose 10%, 12.5 g, Intravenous, PRN    dextrose 10%, 25 g, Intravenous, PRN    hydrALAZINE, 10 mg, Intravenous, Q6H PRN    labetalol, 10 mg, Intravenous, Q6H PRN    melatonin, 6 mg, Oral, Nightly PRN    morphine, 2 mg, Intravenous, Q4H PRN    ondansetron, 4 mg, Intravenous, Q6H PRN    oxyCODONE, 5 mg, Oral, Q4H PRN    sodium chloride 0.9%, 10 mL, Intravenous, PRN     Review of patient's allergies indicates:  No Known Allergies  Objective:     Vital Signs (Most Recent):  Temp: 97.9 °F (36.6 °C) (08/08/24 0746)  Pulse: 90 (08/08/24 0746)  Resp: 18 (08/08/24 0746)  BP: 134/88 (08/08/24 0746)  SpO2: 99 % (08/08/24 0746) Vital Signs (24h Range):  Temp:  [97.9 °F (36.6 °C)-98.2 °F (36.8 °C)] 97.9 °F (36.6 °C)  Pulse:  [] 90  Resp:  [16-20] 18  SpO2:  [95 %-99 %] 99 %  BP: (134-190)/() 134/88     Weight: 50 kg (110 lb 3.7 oz)  Body mass index is 20.83 kg/m².    Intake/Output - Last 3 Shifts         08/06 0700 08/07 0659 08/07 0700 08/08 0659 08/08 0700 08/09 0659    P.O.       I.V. (mL/kg) 1453.4 (29.1) 1279.6 (25.6)     Blood 200      IV Piggyback 2000      Total Intake(mL/kg) 3653.4 (73.1) 1279.6 (25.6)     Urine (mL/kg/hr) 550 (0.5)      Blood 200      Total Output 750      Net +2903.4 +1279.6            Urine Occurrence 3 x 2 x     Stool Occurrence 0 x               Physical Exam  Vitals and nursing note reviewed.   Constitutional:       General: She is not in acute distress.     Appearance: She is not toxic-appearing.   HENT:       Head: Normocephalic and atraumatic.   Eyes:      General: No scleral icterus.     Extraocular Movements: Extraocular movements intact.   Cardiovascular:      Rate and Rhythm: Normal rate and regular rhythm.   Pulmonary:      Effort: Pulmonary effort is normal. No respiratory distress.   Abdominal:      General: There is no distension.      Palpations: Abdomen is soft.      Tenderness: There is abdominal tenderness (appropriately). There is no guarding or rebound.      Comments: Midline incision dressing removed, cdi  Ileostomy pink, patent, with gas and bowel sweat in bag   Skin:     General: Skin is warm and dry.   Neurological:      General: No focal deficit present.      Mental Status: She is alert and oriented to person, place, and time.          Significant Labs:  I have reviewed all pertinent lab results within the past 24 hours.  CBC:   Recent Labs   Lab 08/08/24  0516   WBC 15.24*   RBC 5.26   HGB 9.8*   HCT 32.0*   *   MCV 61*   MCH 18.6*   MCHC 30.6*     CMP:   Recent Labs   Lab 08/05/24  1547 08/06/24  0513 08/08/24  0516   *   < > 106   CALCIUM 9.6   < > 9.1   ALBUMIN 3.4*   < > 2.5*   PROT 7.6  --   --       < > 136   K 3.2*   < > 3.8   CO2 28   < > 23   CL 92*   < > 101   BUN 10   < > 6   CREATININE 0.7   < > 0.5   ALKPHOS 105  --   --    ALT 7*  --   --    AST 11  --   --    BILITOT 0.1  --   --     < > = values in this interval not displayed.       Significant Diagnostics:  I have reviewed all pertinent imaging results/findings within the past 24 hours.

## 2024-08-08 NOTE — ASSESSMENT & PLAN NOTE
Rosette Wood is a 53 y.o. female who presents with abdominal pain with associated diarrhea that has now evolved into PO intolerance with frequent emesis. Imaging demonstrated colonic intussusception at the hepatic flexure which also appears to be present on a non-con scan ordered two weeks ago but was not commented on in the final read. She clearly has one large lesion in the liver and in the context of colonic intussuception is likely reflective of a primary colon cancer leading to the intussusception with hepatic metastasis and possibly pulmonary metastasis as well. On my review there appear to be at least two other hepatic lesions. Ms. Wood presenting at least partially obstructed and required operation. S/p open extended R colectomy with end ileostomy and biopsy of L liver lesion on 8/6    -Continue FLD, potential advancement this afternoon  -DC mIVF  -Amlodipine 5mg added for hypertension  -Follow up liver biopsies  -CEA <1.7  -PT/OT  -MMPC  -Daily labs  -DVT ppx

## 2024-08-08 NOTE — PT/OT/SLP PROGRESS
Physical Therapy Treatment    Patient Name:  Rosette Wood   MRN:  [id number]    Recommendations:     Discharge Recommendations: Low Intensity Therapy  Discharge Equipment Recommendations: walker, rolling (may assist pt for recovery - support, balance, and decrease pain during mobility)  Barriers to discharge:  Pt has a flight of stairs to enter home and currently req increased assistance w mobility compared to premorbid levels    Assessment:     Rosette Wood is a 53 y.o. female admitted with a medical diagnosis of Colonic intussusception.  She presents with the following impairments/functional limitations: weakness, impaired endurance, impaired self care skills, impaired functional mobility, gait instability, impaired balance, pain, impaired skin Pt cooperative and motivated throughout session. Pt progressing toward goal achievement. Rec continued acute therapy participation with LIT upon discharge to home setting. .    Rehab Prognosis: Good; patient would benefit from acute skilled PT services to address these deficits and reach maximum level of function.    Recent Surgery: Pt cooperative and motivated throughout session. Pt progressing toward goal achievement. Rec continued acute therapy participation with LIT upon discharge to home setting.  2 Days Post-Op    Plan:     During this hospitalization, patient to be seen 5 x/week to address the identified rehab impairments via gait training, therapeutic activities, therapeutic exercises, neuromuscular re-education and progress toward the following goals:    Plan of Care Expires:  09/07/24    Subjective     Chief Complaint: c/o pain at sx site of abd;  pt pleasant and cooperative throughout session  Patient/Family Comments/goals: PLOF   independent  Pain/Comfort:  Pain Rating 1: 8/10  Location - Side 1: Bilateral  Location - Orientation 1: generalized  Location 1: abdomen  Pain Addressed 1: Pre-medicate for activity, Reposition, Distraction, Cessation of Activity,  Nurse notified  Pain Rating Post-Intervention 1:  (no numeric value provided)      Objective:     Communicated with nsg prior to session.  Patient found supine with peripheral IV upon PT entry to room.     General Precautions: Standard, fall  Orthopedic Precautions:  (abd sx = no lifting > 10 #)  Braces: N/A  Respiratory Status: Room air     Functional Mobility:  Bed mobility:  mod ind with slow careful movements but declining assistance wanting to perf independently  Txfs sup<>sit mod ind with slow mvments  Txfs sit<>stand CGA from EOB without AD;  txfs toilet<>stand Sup using side rail;  txfs low surface chair<>stand close SBA with VC technique and hand placement  Pre gait and gait:  amb without AD with assistance with IV maneuvering ~3' x 2 reps, amb ~12' using bed frame once for touch point and then 12' again without UE use without AD - declined RW use this session;  balance Good-/Fair+   but no true LOB demo'd;  amb slightly flexed posture with slow caleb and decreased arm swing but increased ease of movement compared to initial gait trial      AM-PAC 6 CLICK MOBILITY  Turning over in bed (including adjusting bedclothes, sheets and blankets)?: 4  Sitting down on and standing up from a chair with arms (e.g., wheelchair, bedside commode, etc.): 4  Moving from lying on back to sitting on the side of the bed?: 4  Moving to and from a bed to a chair (including a wheelchair)?: 3  Need to walk in hospital room?: 3  Climbing 3-5 steps with a railing?: 3  Basic Mobility Total Score: 21       Treatment & Education:  Provided ed for PT POC, safety w mobility, bed mob training, txfs training from variety of surfaces, toileting with self care hygiene set up/Sup without LOB, static stance balance and endurance act; gait training; left pt OOB chair upon completion of activity;  pt perf x 10 reps AP and LAQ with ed for ind performance of these ex during the day     Patient left up in chair with all lines intact, call button  in reach, chair alarm on, and nsg notified..    GOALS:   Multidisciplinary Problems       Physical Therapy Goals          Problem: Physical Therapy    Goal Priority Disciplines Outcome Goal Variances Interventions   Physical Therapy Goal     PT, PT/OT Progressing     Description: Goals to be met by: 2024     Patient will increase functional independence with mobility by performin. Supine to sit with Modified [place]  2. Sit to supine with Modified [place]  3. Rolling with Modified [place].  4. Sit to stand transfer with Modified [place] with Rolling Walker as needed  5. Bed to chair transfer with Modified [place] using Rolling Walker as needed  6. Gait  x 200 feet with Modified [place] using Rolling Walker as needed   7. Ascend/descend 1 flight of stairs with left Handrails Modified Gregg   8. Lower extremity exercise program x10 reps with independence                         Time Tracking:     PT Received On: 24  PT Start Time: 0832     PT Stop Time: 0900  PT Total Time (min): 28 min     Billable Minutes: Gait Training 15 and Therapeutic Activity 13    Treatment Type: Treatment  PT/PTA: PT     Number of PTA visits since last PT visit: 0     2024

## 2024-08-08 NOTE — PLAN OF CARE
Problem: Physical Therapy  Goal: Physical Therapy Goal  Description: Goals to be met by: 2024     Patient will increase functional independence with mobility by performin. Supine to sit with Modified [place]  2. Sit to supine with Modified [place]  3. Rolling with Modified [place].  4. Sit to stand transfer with Modified [place] with Rolling Walker as needed  5. Bed to chair transfer with Modified [place] using Rolling Walker as needed  6. Gait  x 200 feet with Modified [place] using Rolling Walker as needed   7. Ascend/descend 1 flight of stairs with left Handrails Modified [place]   8. Lower extremity exercise program x10 reps with independence    Outcome: Progressing     Pt cooperative and motivated throughout session.  Pt progressing toward goal achievement.  Rec continued acute therapy participation with LIT upon discharge to home setting.

## 2024-08-08 NOTE — PLAN OF CARE
SW was informed that pt is not medically ready for discharge.     Pt's discharge plan is to discharge home with HH services. Pt and pt's family are agreeable for pt to discharge home with HH services.     Pt has had no HH agency acceptance. SW expanded HH services referral via [facility].     SW will continue to follow.     Future Appointments   Date Time Provider Department Center   8/21/2024  1:20 PM Pedro Saxena MD [facility]   10/8/2024  2:30 PM Sam Langford MD [facility]         08/08/24 1009   Post-Acute Status   Post-Acute Authorization Home Health   Home Health Status Referrals Sent   Coverage Medicaid   Discharge Delays None known at this time   Discharge Plan   Discharge Plan A Home Health

## 2024-08-08 NOTE — PLAN OF CARE
Recommendation:  1. Advance diet to [place]/low residue when medically acceptable.   2. Encourage intake at meals as tolerated. 3. Monitor weight/labs.   4. RD to follow to monitor diert tolerance    Goals:  Pt will tolerate diet with at least 50% intake at meals by RD follow up  Nutrition Goal Status: new

## 2024-08-08 NOTE — PROGRESS NOTES
[facility] Surg  General Surgery  Progress Note    Subjective:     History of Present Illness:  Rosette Wood is a 53 y.o. female with no known medical history who presented to the ED with abdominal pain, nausea, vomiting, and diarrhea. Her pain has been diffuse and started mid July. She does endorse rare hematochezia with diarrhea during this time. Most of the problems with vomiting began after starting colonoscopy prep last week. She is able to tolerate PO at this time, but this is limited by nausea and episodic emesis. Imaging obtained showed colonic intussucption at the hepatic flexure. Additionally there was a liver mass measuring 7 cm in largest dimension in the left liver and an enlarging pulmonary nodule in the right lung. She has never had a prior colonoscopy. Surgical history notable for removal of an ectopic pregnancy. She is not on any blood thinners.         Post-Op Info:  Procedure(s) (LRB):  LAPAROTOMY, EXPLORATORY (N/A)  BIOPSY, LIVER (Left)  COLECTOMY, RIGHT (Right)  CREATION, ILEOSTOMY   2 Days Post-Op     Interval History: Prns for hypertension overnight, no home anti-HTN meds. Tolerating full liquid diet without issues. Still gas in ostomy bag, but no appreciable output.    Medications:  Continuous Infusions:   dextrose 5 % and 0.45 % NaCl with KCl 20 mEq   Intravenous Continuous 50 mL/hr at 08/08/24 0317 Rate Verify at 08/08/24 0317     Scheduled Meds:   acetaminophen  650 mg Oral Q8H    amLODIPine  5 mg Oral Daily     PRN Meds:  Current Facility-Administered Medications:     dextrose 10%, 12.5 g, Intravenous, PRN    dextrose 10%, 25 g, Intravenous, PRN    hydrALAZINE, 10 mg, Intravenous, Q6H PRN    labetalol, 10 mg, Intravenous, Q6H PRN    melatonin, 6 mg, Oral, Nightly PRN    morphine, 2 mg, Intravenous, Q4H PRN    ondansetron, 4 mg, Intravenous, Q6H PRN    oxyCODONE, 5 mg, Oral, Q4H PRN    sodium chloride 0.9%, 10 mL, Intravenous, PRN     Review of patient's allergies indicates:  No Known  Allergies  Objective:     Vital Signs (Most Recent):  Temp: 97.9 °F (36.6 °C) (08/08/24 0746)  Pulse: 90 (08/08/24 0746)  Resp: 18 (08/08/24 0746)  BP: 134/88 (08/08/24 0746)  SpO2: 99 % (08/08/24 0746) Vital Signs (24h Range):  Temp:  [97.9 °F (36.6 °C)-98.2 °F (36.8 °C)] 97.9 °F (36.6 °C)  Pulse:  [] 90  Resp:  [16-20] 18  SpO2:  [95 %-99 %] 99 %  BP: (134-190)/() 134/88     Weight: 50 kg (110 lb 3.7 oz)  Body mass index is 20.83 kg/m².    Intake/Output - Last 3 Shifts         08/06 0700  08/07 0659 08/07 0700 08/08 0659 08/08 0700 08/09 0659    P.O.       I.V. (mL/kg) 1453.4 (29.1) 1279.6 (25.6)     Blood 200      IV Piggyback 2000      Total Intake(mL/kg) 3653.4 (73.1) 1279.6 (25.6)     Urine (mL/kg/hr) 550 (0.5)      Blood 200      Total Output 750      Net +2903.4 +1279.6            Urine Occurrence 3 x 2 x     Stool Occurrence 0 x               Physical Exam  Vitals and nursing note reviewed.   Constitutional:       General: She is not in acute distress.     Appearance: She is not toxic-appearing.   HENT:      Head: Normocephalic and atraumatic.   Eyes:      General: No scleral icterus.     Extraocular Movements: Extraocular movements intact.   Cardiovascular:      Rate and Rhythm: Normal rate and regular rhythm.   Pulmonary:      Effort: Pulmonary effort is normal. No respiratory distress.   Abdominal:      General: There is no distension.      Palpations: Abdomen is soft.      Tenderness: There is abdominal tenderness (appropriately). There is no guarding or rebound.      Comments: Midline incision dressing removed, cdi  Ileostomy pink, patent, with gas and bowel sweat in bag   Skin:     General: Skin is warm and dry.   Neurological:      General: No focal deficit present.      Mental Status: She is alert and oriented to person, place, and time.          Significant Labs:  I have reviewed all pertinent lab results within the past 24 hours.  CBC:   Recent Labs   Lab 08/08/24  0516   WBC 15.24*    RBC 5.26   HGB 9.8*   HCT 32.0*   *   MCV 61*   MCH 18.6*   MCHC 30.6*     CMP:   Recent Labs   Lab 08/05/24  1547 08/06/24  0513 08/08/24  0516   *   < > 106   CALCIUM 9.6   < > 9.1   ALBUMIN 3.4*   < > 2.5*   PROT 7.6  --   --       < > 136   K 3.2*   < > 3.8   CO2 28   < > 23   CL 92*   < > 101   BUN 10   < > 6   CREATININE 0.7   < > 0.5   ALKPHOS 105  --   --    ALT 7*  --   --    AST 11  --   --    BILITOT 0.1  --   --     < > = values in this interval not displayed.       Significant Diagnostics:  I have reviewed all pertinent imaging results/findings within the past 24 hours.  Assessment/Plan:     * Colonic intussusception  Rosette Wood is a 53 y.o. female who presents with abdominal pain with associated diarrhea that has now evolved into PO intolerance with frequent emesis. Imaging demonstrated colonic intussusception at the hepatic flexure which also appears to be present on a non-con scan ordered two weeks ago but was not commented on in the final read. She clearly has one large lesion in the liver and in the context of colonic intussuception is likely reflective of a primary colon cancer leading to the intussusception with hepatic metastasis and possibly pulmonary metastasis as well. On my review there appear to be at least two other hepatic lesions. Ms. Wood presenting at least partially obstructed and required operation. S/p open extended R colectomy with end ileostomy and biopsy of L liver lesion on 8/6    -Continue FLD, potential advancement this afternoon  -DC mIVF  -Amlodipine 5mg added for hypertension  -Follow up liver biopsies  -CEA <1.7  -PT/OT  -MMPC  -Daily labs  -DVT ppx        Edwar Singh MD  General Surgery  [place] [facility]

## 2024-08-08 NOTE — NURSING
Pt safety maintained. IVF infusing. Pt on continuous cardiac monitoring. Mediation administered per MAR. Ostomy w/minimal output overnight. Pt OOB, up to bathroom w/ staff assistance. Pt instructed to call w/any needs, verbalized understanding. Bed in lowest position, locked and bed alarms on. Call light w/in pt's reach.

## 2024-08-08 NOTE — PLAN OF CARE
Problem: Adult Inpatient Plan of Care  Goal: Plan of Care Review  Outcome: Progressing  Goal: Patient-Specific Goal (Individualized)  Outcome: Progressing  Goal: Absence of Hospital-Acquired Illness or Injury  Outcome: Progressing  Goal: Optimal Comfort and Wellbeing  Outcome: Progressing  Goal: Readiness for Transition of Care  Outcome: Progressing     Problem: Nausea and Vomiting  Goal: Nausea and Vomiting Relief  Outcome: Progressing     Problem: Wound  Goal: Optimal Coping  Outcome: Progressing  Goal: Optimal Functional Ability  Outcome: Progressing

## 2024-08-08 NOTE — PT/OT/SLP EVAL
Occupational Therapy   Evaluation, tx    Name: Rosette Wood  MRN: [id number]  Admitting Diagnosis: Colonic intussusception  Recent Surgery: Procedure(s) (LRB):  LAPAROTOMY, EXPLORATORY (N/A)  BIOPSY, LIVER (Left)  COLECTOMY, RIGHT (Right)  CREATION, ILEOSTOMY 2 Days Post-Op  The primary encounter diagnosis was Colonic mass. A diagnosis of Intussusception was also pertinent to this visit.    Recommendations:     Discharge Recommendations:    Discharge Equipment Recommendations:  shower chair, hip kit  Barriers to discharge:  None    Assessment:     Rosette Wood is a 53 y.o. female with a medical diagnosis of Colonic intussusception.  She presents with pain. Performance deficits affecting function: weakness, impaired endurance, impaired functional mobility, gait instability, impaired balance, decreased lower extremity function, pain, decreased safety awareness, impaired skin.      Pt reporting abd incision pain 8/10 despite being premedicated. She does report pain is better than yesterday and getting better.  Pt ambulated SBA in room no AD for short distance w/ slow pace 2/2 guarded in pain. G/hygiene supervision standing at sink was simulated as pt reported having already completed prior. LE dressing total A needed to don socks. LB ADLS is where pt is most limited in performing due to  decreased ability bend down and lift legs due to pain. Pt performed toileting in bathroom with SBA prior to OT visit. Bed mobility performed SBA with extra time due to pain. Pt would benefit from Low Intensity Therapy at [place]. She lived alone and was Indep without a device. Continue with OT POC to address goals and maximize Indep for safe transition to home.     Rehab Prognosis: Good; patient would benefit from acute skilled OT services to address these deficits and reach maximum level of function.       Plan:     Patient to be seen 3 x/week to address the above listed problems via self-care/home management, therapeutic activities,  therapeutic exercises  Plan of Care Expires:    Plan of Care Reviewed with: patient    Subjective     Chief Complaint: abd incision pain 8/10  Patient/Family Comments/goals: pt agreeable by guarded as she reported she just got back in bed after working with PT and doing her morning ADL routine.     Occupational Profile:  Living Environment: pt lives alone in a 2nd floor apartment with 2 flights of stairs ~12 steps each flight with LHR ascending and wall on opposite side; t/\s combo.   Previous level of function: Indep ADLs, ambulation without a device, does not drives; pays friends to take her on errands; cooks,. Cleans, unemployed. Pt looking to work again, said she was fired and she needs to work to pay her bills.   Roles and Routines: active  Equipment Used at Home: none  Assistance upon Discharge: friends, neighbors    Pain/Comfort:  Pain Rating 1: 8/10  Location - Side 1: Bilateral  Location - Orientation 1: generalized  Location 1: abdomen (incision)  Pain Addressed 1: Pre-medicate for activity, Reposition, Distraction  Pain Rating Post-Intervention 1: 8/10    Patients cultural, spiritual, Episcopal conflicts given the current situation: no    Objective:     Communicated with: nurse prior to session.  Patient found HOB elevated with peripheral IV, colostomy upon OT entry to room.    General Precautions: Standard, fall  Orthopedic Precautions:  (abdominal precautions: no lifting>10 lbs)  Braces: N/A  Respiratory Status: Room air    Occupational Performance:    Bed Mobility:    Patient completed Scooting/Bridging with stand by assistance  Patient completed Supine to Sit with stand by assistance  Patient completed Sit to Supine with stand by assistance    Functional Mobility/Transfers:  Patient completed Sit <> Stand Transfer with stand by assistance  with  no assistive device   Functional Mobility: ambulated 5 ft to sink and back, slow, guarded due to pan, no LOB. Declined farther distance ambulation due to  pain and already recently finished with PT    Activities of Daily Living:  Feeding:  independence liquid diet  Grooming: supervision standing at sink  Lower Body Dressing: total assistance socks seated EOB, unable to lift legs and cross legs due to pain  Toileting: stand by assistance simulated 2/2 already done prior;     Cognitive/Visual Perceptual:  Cognitive/Psychosocial Skills:     -       Oriented to: Person, Place, Time, and Situation   -       Follows Commands/attention:Follows multistep  commands  -       Communication: clear/fluent  -       Memory: No Deficits noted  -       Safety awareness/insight to disability: impaired   -       Mood/Affect/Coping skills/emotional control: Cooperative  Visual/Perceptual:      -Intact .    Physical Exam:  Balance:    -       sitting: good  dynamic: fair  standing; good-  dynamic: fair plus  Postural examination/scapula alignment:    -       No postural abnormalities identified  Skin integrity: R ileostomy, staples midline abdomen  Dominant hand:    -       right  Upper Extremity Range of Motion:     -       Right Upper Extremity: WFL  -       Left Upper Extremity: WFL  Upper Extremity Strength:    -       Right Upper Extremity: WFL  -       Left Upper Extremity: WFL   Strength:    -       Right Upper Extremity: WFL  -       Left Upper Extremity: WFL    AMPAC 6 Click ADL:  AMPAC Total Score: 22    Treatment & Education:  Purpose of OT and poc  Pt educated/instructed  BUE AROM ex while in bed with emphasis on raising arm over head stretching. She demonstrated Indep return. Ongoing with HEP handout to be issued.  Educated in hip kit use for LB ADLS. Ongoing.  Hip Kit trainig session.   Dc planning: Low Intensity Therapy and shower chair recommended. Pt verbalized understanding.   All questions/concerns addressed within scope. Call don't fall for fall prevention. She verbalized understanding.     Patient left HOB elevated with all lines intact, call button in reach, and  bed alarm on    GOALS:   Multidisciplinary Problems       Occupational Therapy Goals          Problem: Occupational Therapy    Goal Priority Disciplines Outcome Interventions   Occupational Therapy Goal     OT, PT/OT Progressing    Description: Goals to be met by: 8/15/2024     Patient will increase functional independence with ADLs by performing:    UE Dressing with Modified [place].  LE Dressing with Modified [place] and Assistive Devices as needed.  Grooming while standing at sink with Modified [place].  Toileting from toilet with Modified [place] for hygiene and clothing management.   Supine to sit with Modified [place].  Toilet transfer to toilet with Modified [place].  Upper extremity AROM exercise program x10 reps per handout, with independence.                         History:     No past medical history on file.      Past Surgical History:   Procedure Laterality Date    COLECTOMY, RIGHT Right 8/6/2024    Procedure: COLECTOMY, RIGHT;  Surgeon: Pedro Saxena MD;  Location: [facility] OR;  Service: General;  Laterality: Right;    ILEOSTOMY  8/6/2024    Procedure: CREATION, ILEOSTOMY;  Surgeon: Pedro Saxena MD;  Location: [facility] OR;  Service: General;;    LAPAROTOMY, EXPLORATORY N/A 8/6/2024    Procedure: LAPAROTOMY, EXPLORATORY;  Surgeon: Pedro Saxena MD;  Location: [facility] OR;  Service: General;  Laterality: N/A;    LIVER BIOPSY Left 8/6/2024    Procedure: BIOPSY, LIVER;  Surgeon: Pedro Saxena MD;  Location: [facility] OR;  Service: General;  Laterality: Left;  left liver mass biopsy       Time Tracking:     OT Date of Treatment: 08/08/24  OT Start Time: 1105  OT Stop Time: 1120  OT Total Time (min): 15 min    Billable Minutes:Evaluation 7  Self Care/Home Management 8  Total Time 15    8/8/2024

## 2024-08-08 NOTE — CONSULTS
"  [place] [facility] Surg  Adult Nutrition  Consult Note    SUMMARY     Recommendations    Recommendation:  1. Advance diet to [place]/low residue when medically acceptable.   2. Encourage intake at meals as tolerated. 3. Monitor weight/labs.   4. RD to follow to monitor diert tolerance    Goals:  Pt will tolerate diet with at least 50% intake at meals by RD follow up  Nutrition Goal Status: new  Communication of RD Recs: other (comment) (POC)    Assessment and Plan  Nutrition Problem  Altered GI Function    Related to (etiology):   Colonic intussusception    Signs and Symptoms (as evidenced by):   S/p colectomy with ileostomy     Interventions:  Collaboration with other providers  Fiber modified diet    Nutrition Diagnosis Status:   New      Malnutrition Assessment  Weight Loss (Malnutrition):  (8% x 6 months)     Reason for Assessment  Reason For Assessment: consult (new ileostomy)  Diagnosis:  (colonic intussusception)  Relevant Medical History: none noted  General Information Comments: Pt admitted with abd pain/N/V. Pt s/p expl lap, liver biospy, R colectomy, and ileostomy on 8/6. Pt curently on Full Liquid diet. Tolerating well. Denies N/V. Noted 10lb weight loss x 6 months. Terrell 19- LQ incision. Unable to assess NFPE at visit. Educated pt on ilesotomy diet and provided her with handouts.  Nutrition Discharge Planning: pt to d/c on low residue/[place] diet    Nutrition Risk Screen  Nutrition Risk Screen: unintentional loss of 10 lbs or more in the past 2 months    Nutrition/Diet History  Food Preferences: no Muslim or cultural food prefs identifed  Spiritual, Cultural Beliefs, Jew Practices, Values that Affect Care: no  Factors Affecting Nutritional Intake: altered gastrointestinal function    Anthropometrics  Temp: 97.8 °F (36.6 °C)  Height Method: Stated  Height: 5' 1" (154.9 cm)  Height (inches): 61 in  Weight Method: Bed Scale  Weight: 50 kg (110 lb 3.7 oz)  Weight (lb): 110.23 lb  Ideal Body Weight (IBW), " Female: 105 lb  % Ideal Body Weight, Female (lb): 104.98 %  BMI (Calculated): 20.8  BMI Grade: 18.5-24.9 - normal  Usual Body Weight (UBW), k.4 kg (2/8)  % Usual Body Weight: 92.1  % Weight Change From Usual Weight: -8.09 %     Lab/Procedures/Meds  Pertinent Labs Reviewed: reviewed  Pertinent Labs Comments: BUN 5L, Glu 123H, Ca 8.6L, Alb 2.6L, PAB 10L  Pertinent Medications Reviewed: reviewed  Pertinent Medications Comments: tylenol    Estimated/Assessed Needs  Weight Used For Calorie Calculations: 50 kg (110 lb 3.7 oz)  Energy Calorie Requirements (kcal): 1750 (35 kcal/kg)  Energy Need Method: Kcal/kg  Protein Requirements: 65g (1.3g/kg)  Weight Used For Protein Calculations: 50 kg (110 lb 3.7 oz)  Estimated Fluid Requirement Method: RDA Method  RDA Method (mL): 1750     Nutrition Prescription Ordered  Current Diet Order: Full Liquid    Evaluation of Received Nutrient/Fluid Intake  I/O: 1279/0  Energy Calories Required: not meeting needs  Protein Required: not meeting needs  Fluid Required: not meeting needs  Comments: LBM 8/5  % Intake of Estimated Energy Needs: 0 - 25 %  % Meal Intake: 0 - 25 %    Nutrition Risk  Level of Risk/Frequency of Follow-up:  (2xweekly)     Monitor and Evaluation  Food and Nutrient Intake: food and beverage intake  Food and Nutrient Adminstration: diet order  Knowledge/Beliefs/Attitudes: food and nutrition knowledge/skill  Physical Activity and Function: nutrition-related ADLs and IADLs  Anthropometric Measurements: weight  Biochemical Data, Medical Tests and Procedures: electrolyte and renal panel  Nutrition-Focused Physical Findings: overall appearance     Nutrition Related Social Determinants of Health: SDOH: Adequate food in home environment     Nutrition Follow-Up  RD Follow-up?: Yes

## 2024-08-08 NOTE — NURSING
Pt w/elevated BP. PRN medication administered. Pt c/o incisional pain 7/10. PRN pain medication administered per MAR.

## 2024-08-08 NOTE — NURSING
Received report from Selena, received the patient lying supine with HOB elevated, bed locked in low with alarm on for safety, call light in reach at present, instructed to call for assistance.

## 2024-08-08 NOTE — PLAN OF CARE
Problem: Occupational Therapy  Goal: Occupational Therapy Goal  Description: Goals to be met by: 8/15/2024     Patient will increase functional independence with ADLs by performing:    UE Dressing with Modified [place].  LE Dressing with Modified [place] and Assistive Devices as needed.  Grooming while standing at sink with Modified [place].  Toileting from toilet with Modified [place] for hygiene and clothing management.   Supine to sit with Modified [place].  Toilet transfer to toilet with Modified [place].  Upper extremity AROM exercise program x10 reps per handout, with independence.    Outcome: Progressing

## 2024-08-09 LAB
ALBUMIN SERPL BCP-MCNC: 2.5 G/DL (ref 3.5–5.2)
ANION GAP SERPL CALC-SCNC: 14 MMOL/L (ref 8–16)
BASOPHILS # BLD AUTO: 0.1 K/UL (ref 0–0.2)
BASOPHILS NFR BLD: 0.8 % (ref 0–1.9)
BUN SERPL-MCNC: 7 MG/DL (ref 6–20)
CALCIUM SERPL-MCNC: 9 MG/DL (ref 8.7–10.5)
CHLORIDE SERPL-SCNC: 99 MMOL/L (ref 95–110)
CO2 SERPL-SCNC: 23 MMOL/L (ref 23–29)
CREAT SERPL-MCNC: 0.5 MG/DL (ref 0.5–1.4)
DIFFERENTIAL METHOD BLD: ABNORMAL
EOSINOPHIL # BLD AUTO: 0.2 K/UL (ref 0–0.5)
EOSINOPHIL NFR BLD: 1.3 % (ref 0–8)
ERYTHROCYTE [DISTWIDTH] IN BLOOD BY AUTOMATED COUNT: 30.6 % (ref 11.5–14.5)
EST. GFR  (NO RACE VARIABLE): >60 ML/MIN/1.73 M^2
GLUCOSE SERPL-MCNC: 101 MG/DL (ref 70–110)
HCT VFR BLD AUTO: 33.3 % (ref 37–48.5)
HGB BLD-MCNC: 10.2 G/DL (ref 12–16)
IMM GRANULOCYTES # BLD AUTO: 0.06 K/UL (ref 0–0.04)
IMM GRANULOCYTES NFR BLD AUTO: 0.5 % (ref 0–0.5)
LYMPHOCYTES # BLD AUTO: 1.1 K/UL (ref 1–4.8)
LYMPHOCYTES NFR BLD: 9 % (ref 18–48)
MAGNESIUM SERPL-MCNC: 1.8 MG/DL (ref 1.6–2.6)
MCH RBC QN AUTO: 18.7 PG (ref 27–31)
MCHC RBC AUTO-ENTMCNC: 30.6 G/DL (ref 32–36)
MCV RBC AUTO: 61 FL (ref 82–98)
MONOCYTES # BLD AUTO: 1.1 K/UL (ref 0.3–1)
MONOCYTES NFR BLD: 8.5 % (ref 4–15)
NEUTROPHILS # BLD AUTO: 9.9 K/UL (ref 1.8–7.7)
NEUTROPHILS NFR BLD: 79.9 % (ref 38–73)
NRBC BLD-RTO: 0 /100 WBC
PHOSPHATE SERPL-MCNC: 3.9 MG/DL (ref 2.7–4.5)
PLATELET # BLD AUTO: 753 K/UL (ref 150–450)
PMV BLD AUTO: 10.1 FL (ref 9.2–12.9)
POTASSIUM SERPL-SCNC: 4.3 MMOL/L (ref 3.5–5.1)
RBC # BLD AUTO: 5.46 M/UL (ref 4–5.4)
SODIUM SERPL-SCNC: 136 MMOL/L (ref 136–145)
WBC # BLD AUTO: 12.4 K/UL (ref 3.9–12.7)

## 2024-08-09 PROCEDURE — 63600175 PHARM REV CODE 636 W HCPCS: Performed by: STUDENT IN AN ORGANIZED HEALTH CARE EDUCATION/TRAINING PROGRAM

## 2024-08-09 PROCEDURE — 83735 ASSAY OF MAGNESIUM: CPT

## 2024-08-09 PROCEDURE — 25000003 PHARM REV CODE 250

## 2024-08-09 PROCEDURE — 25000003 PHARM REV CODE 250: Performed by: EMERGENCY MEDICINE

## 2024-08-09 PROCEDURE — 36415 COLL VENOUS BLD VENIPUNCTURE: CPT

## 2024-08-09 PROCEDURE — 21400001 HC TELEMETRY ROOM

## 2024-08-09 PROCEDURE — 63600175 PHARM REV CODE 636 W HCPCS

## 2024-08-09 PROCEDURE — 80069 RENAL FUNCTION PANEL: CPT

## 2024-08-09 PROCEDURE — 85025 COMPLETE CBC W/AUTO DIFF WBC: CPT

## 2024-08-09 RX ORDER — AMLODIPINE BESYLATE 5 MG/1
10 TABLET ORAL DAILY
Status: DISCONTINUED | OUTPATIENT
Start: 2024-08-10 | End: 2024-08-13 | Stop reason: HOSPADM

## 2024-08-09 RX ADMIN — OXYCODONE 5 MG: 5 TABLET ORAL at 01:08

## 2024-08-09 RX ADMIN — MORPHINE SULFATE 2 MG: 2 INJECTION, SOLUTION INTRAMUSCULAR; INTRAVENOUS at 11:08

## 2024-08-09 RX ADMIN — MELATONIN TAB 3 MG 6 MG: 3 TAB at 08:08

## 2024-08-09 RX ADMIN — MORPHINE SULFATE 2 MG: 2 INJECTION, SOLUTION INTRAMUSCULAR; INTRAVENOUS at 08:08

## 2024-08-09 RX ADMIN — MORPHINE SULFATE 2 MG: 2 INJECTION, SOLUTION INTRAMUSCULAR; INTRAVENOUS at 04:08

## 2024-08-09 RX ADMIN — OXYCODONE 5 MG: 5 TABLET ORAL at 05:08

## 2024-08-09 RX ADMIN — AMLODIPINE BESYLATE 5 MG: 5 TABLET ORAL at 09:08

## 2024-08-09 RX ADMIN — ACETAMINOPHEN 650 MG: 325 TABLET ORAL at 10:08

## 2024-08-09 RX ADMIN — ACETAMINOPHEN 650 MG: 325 TABLET ORAL at 01:08

## 2024-08-09 RX ADMIN — OXYCODONE 5 MG: 5 TABLET ORAL at 09:08

## 2024-08-09 RX ADMIN — ACETAMINOPHEN 650 MG: 325 TABLET ORAL at 05:08

## 2024-08-09 RX ADMIN — MORPHINE SULFATE 2 MG: 2 INJECTION, SOLUTION INTRAMUSCULAR; INTRAVENOUS at 03:08

## 2024-08-09 RX ADMIN — OXYCODONE 5 MG: 5 TABLET ORAL at 07:08

## 2024-08-09 RX ADMIN — HYDRALAZINE HYDROCHLORIDE 10 MG: 20 INJECTION, SOLUTION INTRAMUSCULAR; INTRAVENOUS at 01:08

## 2024-08-09 NOTE — PT/OT/SLP PROGRESS
Occupational Therapy  Missed Visit    Patient Name:  Rosette Wood   MRN:  [id number]    Patient not seen today secondary to Therapist assessment (13:46 - Patient noted with  at rest. Nurse reports BP elevated as well.).     8/9/2024

## 2024-08-09 NOTE — NURSING
Received report from Rakel, received the patient sitting up in bed awake and alert, bed locked in low with alarm on and call light in reach at present. Instructed to call for assistance.

## 2024-08-09 NOTE — PROGRESS NOTES
[facility]  [facility]  Progress Note    Subjective:     History of Present Illness:  Rosette Wood is a 53 y.o. female with no known medical history who presented to the ED with abdominal pain, nausea, vomiting, and diarrhea. Her pain has been diffuse and started mid July. She does endorse rare hematochezia with diarrhea during this time. Most of the problems with vomiting began after starting colonoscopy prep last week. She is able to tolerate PO at this time, but this is limited by nausea and episodic emesis. Imaging obtained showed colonic intussucption at the hepatic flexure. Additionally there was a liver mass measuring 7 cm in largest dimension in the left liver and an enlarging pulmonary nodule in the right lung. She has never had a prior colonoscopy. Surgical history notable for removal of an ectopic pregnancy. She is not on any blood thinners.         Post-Op Info:  Procedure(s) (LRB):  LAPAROTOMY, EXPLORATORY (N/A)  BIOPSY, LIVER (Left)  COLECTOMY, RIGHT (Right)  CREATION, ILEOSTOMY   3 Days Post-Op     Interval History: tachycardia overnight to mid 110s associated with pain, resolved with pain medication. Tolerating FLD without n/v, but still distended. Minimal increase in ostomy output     Medications:  Continuous Infusions:  Scheduled Meds:   acetaminophen  650 mg Oral Q8H    amLODIPine  5 mg Oral Daily     PRN Meds:  Current Facility-Administered Medications:     dextrose 10%, 12.5 g, Intravenous, PRN    dextrose 10%, 25 g, Intravenous, PRN    hydrALAZINE, 10 mg, Intravenous, Q6H PRN    labetalol, 10 mg, Intravenous, Q6H PRN    melatonin, 6 mg, Oral, Nightly PRN    morphine, 2 mg, Intravenous, Q4H PRN    ondansetron, 4 mg, Intravenous, Q6H PRN    oxyCODONE, 5 mg, Oral, Q4H PRN    sodium chloride 0.9%, 10 mL, Intravenous, PRN     Review of patient's allergies indicates:  No Known Allergies  Objective:     Vital Signs (Most Recent):  Temp: 98.2 °F (36.8 °C) (08/09/24 0335)  Pulse: 105  (08/09/24 0345)  Resp: 16 (08/09/24 0513)  BP: (!) 149/103 (08/09/24 0335)  SpO2: 98 % (08/09/24 0335) Vital Signs (24h Range):  Temp:  [97.8 °F (36.6 °C)-98.5 °F (36.9 °C)] 98.2 °F (36.8 °C)  Pulse:  [] 105  Resp:  [16-20] 16  SpO2:  [97 %-99 %] 98 %  BP: (128-163)/() 149/103     Weight: 50 kg (110 lb 3.7 oz)  Body mass index is 20.83 kg/m².    Intake/Output - Last 3 Shifts         08/07 0700  08/08 0659 08/08 0700  08/09 0659 08/09 0700  08/10 0659    I.V. (mL/kg) 1279.6 (25.6)      Blood       IV Piggyback       Total Intake(mL/kg) 1279.6 (25.6)      Urine (mL/kg/hr)       Blood       Total Output       Net +1279.6             Urine Occurrence 2 x 1 x     Stool Occurrence  1 x              Physical Exam  Vitals and nursing note reviewed.   Constitutional:       General: She is not in acute distress.     Appearance: She is not toxic-appearing.   HENT:      Head: Normocephalic and atraumatic.   Eyes:      General: No scleral icterus.     Extraocular Movements: Extraocular movements intact.   Cardiovascular:      Rate and Rhythm: Normal rate and regular rhythm.   Pulmonary:      Effort: Pulmonary effort is normal. No respiratory distress.   Abdominal:      General: There is no distension.      Palpations: Abdomen is soft.      Tenderness: There is abdominal tenderness (appropriately). There is no guarding or rebound.      Comments: Midline incision dressing removed, cdi  Ileostomy pink, patent, with mildly increased output   Skin:     General: Skin is warm and dry.   Neurological:      General: No focal deficit present.      Mental Status: She is alert and oriented to person, place, and time.          Significant Labs:  I have reviewed all pertinent lab results within the past 24 hours.    Significant Diagnostics:  I have reviewed all pertinent imaging results/findings within the past 24 hours.  Assessment/Plan:     * Colonic intussusception  Rosette Wood is a 53 y.o. female who presents with  abdominal pain with associated diarrhea that has now evolved into PO intolerance with frequent emesis. Imaging demonstrated colonic intussusception at the hepatic flexure which also appears to be present on a non-con scan ordered two weeks ago but was not commented on in the final read. She clearly has one large lesion in the liver and in the context of colonic intussuception is likely reflective of a primary colon cancer leading to the intussusception with hepatic metastasis and possibly pulmonary metastasis as well. On my review there appear to be at least two other hepatic lesions. Ms. Wood presenting at least partially obstructed and required operation. S/p open extended R colectomy with end ileostomy and biopsy of L liver lesion on 8/6    -Continue FLD, await advancement until more substantial ostomy output  -Amlodipine 5mg added for hypertension  -Follow up liver biopsies  -CEA <1.7  -PT/OT  -MMPC  -Daily labs  -DVT ppx        Edwar Singh MD  General Surgery  [facility]

## 2024-08-09 NOTE — PLAN OF CARE
SW was informed of pt's anticipated discharge date.     Pt's plan for discharge is to discharge home with HH services.     HH referral has been sent via [facility]. Due to pt's insurance, there has been no accepting HH agencies yet. SW expanded HH referral via [facility].     SW will continue to follow.     Future Appointments   Date Time Provider Department Center   8/21/2024  1:20 PM Pedro Saxena MD [facility]   10/8/2024  2:30 PM Sam Langford MD [facility]         08/09/24 1457   Post-Acute Status   Post-Acute Authorization Home Health   Home Health Status Referrals Sent   Coverage Medicaid   Discharge Delays None known at this time   Discharge Plan   Discharge Plan A Home Health

## 2024-08-09 NOTE — PLAN OF CARE
Problem: Adult Inpatient Plan of Care  Goal: Absence of Hospital-Acquired Illness or Injury  Outcome: Progressing     Problem: Pain Acute  Goal: Optimal Pain Control and Function  Outcome: Progressing   Nightly/prn medications administered per MAR. Pain was managed. Surgical incision remain clean dry and intact open to air. On going tele monitoring remain ST at rate of 112bpm sustained. All safety precautions secured. Will continue to monitor.

## 2024-08-09 NOTE — ASSESSMENT & PLAN NOTE
Rosette Wood is a 53 y.o. female who presents with abdominal pain with associated diarrhea that has now evolved into PO intolerance with frequent emesis. Imaging demonstrated colonic intussusception at the hepatic flexure which also appears to be present on a non-con scan ordered two weeks ago but was not commented on in the final read. She clearly has one large lesion in the liver and in the context of colonic intussuception is likely reflective of a primary colon cancer leading to the intussusception with hepatic metastasis and possibly pulmonary metastasis as well. On my review there appear to be at least two other hepatic lesions. Ms. Wood presenting at least partially obstructed and required operation. S/p open extended R colectomy with end ileostomy and biopsy of L liver lesion on 8/6    -Continue FLD, await advancement until more substantial ostomy output  -Amlodipine 5mg added for hypertension  -Follow up liver biopsies  -CEA <1.7  -PT/OT  -MMPC  -Daily labs  -DVT ppx

## 2024-08-09 NOTE — SUBJECTIVE & OBJECTIVE
Interval History: tachycardia overnight to mid 110s associated with pain, resolved with pain medication. Tolerating FLD without n/v, but still distended. Minimal increase in ostomy output     Medications:  Continuous Infusions:  Scheduled Meds:   acetaminophen  650 mg Oral Q8H    amLODIPine  5 mg Oral Daily     PRN Meds:  Current Facility-Administered Medications:     dextrose 10%, 12.5 g, Intravenous, PRN    dextrose 10%, 25 g, Intravenous, PRN    hydrALAZINE, 10 mg, Intravenous, Q6H PRN    labetalol, 10 mg, Intravenous, Q6H PRN    melatonin, 6 mg, Oral, Nightly PRN    morphine, 2 mg, Intravenous, Q4H PRN    ondansetron, 4 mg, Intravenous, Q6H PRN    oxyCODONE, 5 mg, Oral, Q4H PRN    sodium chloride 0.9%, 10 mL, Intravenous, PRN     Review of patient's allergies indicates:  No Known Allergies  Objective:     Vital Signs (Most Recent):  Temp: 98.2 °F (36.8 °C) (08/09/24 0335)  Pulse: 105 (08/09/24 0345)  Resp: 16 (08/09/24 0513)  BP: (!) 149/103 (08/09/24 0335)  SpO2: 98 % (08/09/24 0335) Vital Signs (24h Range):  Temp:  [97.8 °F (36.6 °C)-98.5 °F (36.9 °C)] 98.2 °F (36.8 °C)  Pulse:  [] 105  Resp:  [16-20] 16  SpO2:  [97 %-99 %] 98 %  BP: (128-163)/() 149/103     Weight: 50 kg (110 lb 3.7 oz)  Body mass index is 20.83 kg/m².    Intake/Output - Last 3 Shifts         08/07 0700 08/08 0659 08/08 0700  08/09 0659 08/09 0700  08/10 0659    I.V. (mL/kg) 1279.6 (25.6)      Blood       IV Piggyback       Total Intake(mL/kg) 1279.6 (25.6)      Urine (mL/kg/hr)       Blood       Total Output       Net +1279.6             Urine Occurrence 2 x 1 x     Stool Occurrence  1 x              Physical Exam  Vitals and nursing note reviewed.   Constitutional:       General: She is not in acute distress.     Appearance: She is not toxic-appearing.   HENT:      Head: Normocephalic and atraumatic.   Eyes:      General: No scleral icterus.     Extraocular Movements: Extraocular movements intact.   Cardiovascular:      Rate  and Rhythm: Normal rate and regular rhythm.   Pulmonary:      Effort: Pulmonary effort is normal. No respiratory distress.   Abdominal:      General: There is no distension.      Palpations: Abdomen is soft.      Tenderness: There is abdominal tenderness (appropriately). There is no guarding or rebound.      Comments: Midline incision dressing removed, cdi  Ileostomy pink, patent, with mildly increased output   Skin:     General: Skin is warm and dry.   Neurological:      General: No focal deficit present.      Mental Status: She is alert and oriented to person, place, and time.          Significant Labs:  I have reviewed all pertinent lab results within the past 24 hours.    Significant Diagnostics:  I have reviewed all pertinent imaging results/findings within the past 24 hours.

## 2024-08-09 NOTE — PLAN OF CARE
Problem: Adult Inpatient Plan of Care  Goal: Plan of Care Review  Outcome: Progressing  Goal: Patient-Specific Goal (Individualized)  Outcome: Progressing  Goal: Absence of Hospital-Acquired Illness or Injury  Outcome: Progressing  Goal: Optimal Comfort and Wellbeing  Outcome: Progressing  Goal: Readiness for Transition of Care  Outcome: Progressing     Problem: Pain Acute  Goal: Optimal Pain Control and Function  Outcome: Progressing

## 2024-08-10 LAB
BLD PROD TYP BPU: NORMAL
BLD PROD TYP BPU: NORMAL
BLOOD UNIT EXPIRATION DATE: NORMAL
BLOOD UNIT EXPIRATION DATE: NORMAL
BLOOD UNIT TYPE CODE: 6200
BLOOD UNIT TYPE CODE: 6200
BLOOD UNIT TYPE: NORMAL
BLOOD UNIT TYPE: NORMAL
CODING SYSTEM: NORMAL
CODING SYSTEM: NORMAL
CROSSMATCH INTERPRETATION: NORMAL
CROSSMATCH INTERPRETATION: NORMAL
DISPENSE STATUS: NORMAL
DISPENSE STATUS: NORMAL
TRANS ERYTHROCYTES VOL PATIENT: NORMAL ML
TRANS ERYTHROCYTES VOL PATIENT: NORMAL ML

## 2024-08-10 PROCEDURE — 25000003 PHARM REV CODE 250

## 2024-08-10 PROCEDURE — 21400001 HC TELEMETRY ROOM

## 2024-08-10 PROCEDURE — 25000003 PHARM REV CODE 250: Performed by: EMERGENCY MEDICINE

## 2024-08-10 PROCEDURE — 25000003 PHARM REV CODE 250: Performed by: STUDENT IN AN ORGANIZED HEALTH CARE EDUCATION/TRAINING PROGRAM

## 2024-08-10 PROCEDURE — 63600175 PHARM REV CODE 636 W HCPCS: Performed by: STUDENT IN AN ORGANIZED HEALTH CARE EDUCATION/TRAINING PROGRAM

## 2024-08-10 RX ADMIN — ACETAMINOPHEN 650 MG: 325 TABLET ORAL at 05:08

## 2024-08-10 RX ADMIN — MORPHINE SULFATE 2 MG: 2 INJECTION, SOLUTION INTRAMUSCULAR; INTRAVENOUS at 07:08

## 2024-08-10 RX ADMIN — MORPHINE SULFATE 2 MG: 2 INJECTION, SOLUTION INTRAMUSCULAR; INTRAVENOUS at 05:08

## 2024-08-10 RX ADMIN — ACETAMINOPHEN 650 MG: 325 TABLET ORAL at 10:08

## 2024-08-10 RX ADMIN — OXYCODONE 5 MG: 5 TABLET ORAL at 03:08

## 2024-08-10 RX ADMIN — ACETAMINOPHEN 650 MG: 325 TABLET ORAL at 02:08

## 2024-08-10 RX ADMIN — OXYCODONE 5 MG: 5 TABLET ORAL at 10:08

## 2024-08-10 RX ADMIN — AMLODIPINE BESYLATE 10 MG: 5 TABLET ORAL at 08:08

## 2024-08-10 RX ADMIN — MELATONIN TAB 3 MG 6 MG: 3 TAB at 07:08

## 2024-08-10 NOTE — NURSING
Patient AAOx4, sitting up in bed, HOB elevated, call light within reach. Administered tylenol as ordered, complaint of pain still stands after pain med reassessment.

## 2024-08-10 NOTE — PROGRESS NOTES
[facility]  General Surgery  Progress Note    Subjective:     Interval History:   Pain controlled  Passing flatus and bowel contents ostomy bag  Jayna reg diet but not much appetite    Post-Op Info:  Procedure(s) (LRB):  LAPAROTOMY, EXPLORATORY (N/A)  BIOPSY, LIVER (Left)  COLECTOMY, RIGHT (Right)  CREATION, ILEOSTOMY   4 Days Post-Op      Medications:  Continuous Infusions:  Scheduled Meds:   acetaminophen  650 mg Oral Q8H    amLODIPine  10 mg Oral Daily     PRN Meds:  Current Facility-Administered Medications:     dextrose 10%, 12.5 g, Intravenous, PRN    dextrose 10%, 25 g, Intravenous, PRN    hydrALAZINE, 10 mg, Intravenous, Q6H PRN    labetalol, 10 mg, Intravenous, Q6H PRN    melatonin, 6 mg, Oral, Nightly PRN    morphine, 2 mg, Intravenous, Q4H PRN    ondansetron, 4 mg, Intravenous, Q6H PRN    oxyCODONE, 5 mg, Oral, Q4H PRN    sodium chloride 0.9%, 10 mL, Intravenous, PRN     Objective:     Vital Signs (Most Recent):  Temp: 98.5 °F (36.9 °C) (08/10/24 1547)  Pulse: (!) 112 (08/10/24 1641)  Resp: 18 (08/10/24 1552)  BP: (!) 140/89 (08/10/24 1547)  SpO2: 98 % (08/10/24 1547) Vital Signs (24h Range):  Temp:  [97.8 °F (36.6 °C)-99.2 °F (37.3 °C)] 98.5 °F (36.9 °C)  Pulse:  [100-135] 112  Resp:  [16-20] 18  SpO2:  [95 %-100 %] 98 %  BP: (140-160)/() 140/89       Intake/Output Summary (Last 24 hours) at 8/10/2024 1654  Last data filed at 8/10/2024 1611  Gross per 24 hour   Intake --   Output 50 ml   Net -50 ml       Physical Exam  Ab appropriate, incision good  Ostomy working    Significant Labs:  CBC:   Recent Labs   Lab 08/09/24  0510   WBC 12.40   RBC 5.46*   HGB 10.2*   HCT 33.3*   *   MCV 61*   MCH 18.7*   MCHC 30.6*     CMP:   Recent Labs   Lab 08/09/24  0510      CALCIUM 9.0   ALBUMIN 2.5*      K 4.3   CO2 23   CL 99   BUN 7   CREATININE 0.5       Significant Diagnostics:  I have reviewed all pertinent imaging results/findings within the past 24 hours.    Assessment/Plan:      Active Diagnoses:    Diagnosis Date Noted POA    PRINCIPAL PROBLEM:  Colonic intussusception [K56.1] 08/06/2024 Yes      Problems Resolved During this Admission:     S/p right hemicolectomy  Path pending  Reg diet  OOB  Home soon, pending rehab vs home health?      Pedro Saxena MD  General Surgery  [facility]

## 2024-08-10 NOTE — PLAN OF CARE
Problem: Adult Inpatient Plan of Care  Goal: Plan of Care Review  Outcome: Progressing  Goal: Patient-Specific Goal (Individualized)  Outcome: Progressing  Goal: Absence of Hospital-Acquired Illness or Injury  Outcome: Progressing  Goal: Optimal Comfort and Wellbeing  Outcome: Progressing  Goal: Readiness for Transition of Care  Outcome: Progressing     Problem: Pain Acute  Goal: Optimal Pain Control and Function  Outcome: Progressing     Problem: Nausea and Vomiting  Goal: Nausea and Vomiting Relief  Outcome: Progressing     Problem: Electrolyte Imbalance  Goal: Electrolyte Balance  Outcome: Progressing     Problem: Wound  Goal: Optimal Coping  Outcome: Progressing  Goal: Optimal Functional Ability  Outcome: Progressing  Goal: Absence of Infection Signs and Symptoms  Outcome: Progressing  Goal: Improved Oral Intake  Outcome: Progressing  Goal: Optimal Pain Control and Function  Outcome: Progressing  Goal: Skin Health and Integrity  Outcome: Progressing  Goal: Optimal Wound Healing  Outcome: Progressing     Problem: Infection  Goal: Absence of Infection Signs and Symptoms  Outcome: Progressing

## 2024-08-11 PROCEDURE — 21400001 HC TELEMETRY ROOM

## 2024-08-11 PROCEDURE — 25000003 PHARM REV CODE 250: Performed by: EMERGENCY MEDICINE

## 2024-08-11 PROCEDURE — 25000003 PHARM REV CODE 250

## 2024-08-11 PROCEDURE — 63600175 PHARM REV CODE 636 W HCPCS: Performed by: STUDENT IN AN ORGANIZED HEALTH CARE EDUCATION/TRAINING PROGRAM

## 2024-08-11 PROCEDURE — 25000003 PHARM REV CODE 250: Performed by: STUDENT IN AN ORGANIZED HEALTH CARE EDUCATION/TRAINING PROGRAM

## 2024-08-11 RX ORDER — LOPERAMIDE HYDROCHLORIDE 2 MG/1
2 CAPSULE ORAL DAILY
Status: DISCONTINUED | OUTPATIENT
Start: 2024-08-11 | End: 2024-08-12

## 2024-08-11 RX ADMIN — OXYCODONE 5 MG: 5 TABLET ORAL at 06:08

## 2024-08-11 RX ADMIN — ACETAMINOPHEN 650 MG: 325 TABLET ORAL at 05:08

## 2024-08-11 RX ADMIN — AMLODIPINE BESYLATE 10 MG: 5 TABLET ORAL at 08:08

## 2024-08-11 RX ADMIN — MORPHINE SULFATE 2 MG: 2 INJECTION, SOLUTION INTRAMUSCULAR; INTRAVENOUS at 09:08

## 2024-08-11 RX ADMIN — MORPHINE SULFATE 2 MG: 2 INJECTION, SOLUTION INTRAMUSCULAR; INTRAVENOUS at 03:08

## 2024-08-11 RX ADMIN — ACETAMINOPHEN 650 MG: 325 TABLET ORAL at 09:08

## 2024-08-11 RX ADMIN — MELATONIN TAB 3 MG 6 MG: 3 TAB at 09:08

## 2024-08-11 RX ADMIN — LOPERAMIDE HYDROCHLORIDE 2 MG: 2 CAPSULE ORAL at 05:08

## 2024-08-11 RX ADMIN — MORPHINE SULFATE 2 MG: 2 INJECTION, SOLUTION INTRAMUSCULAR; INTRAVENOUS at 08:08

## 2024-08-11 RX ADMIN — ACETAMINOPHEN 650 MG: 325 TABLET ORAL at 01:08

## 2024-08-11 RX ADMIN — OXYCODONE 5 MG: 5 TABLET ORAL at 01:08

## 2024-08-11 RX ADMIN — SODIUM CHLORIDE, POTASSIUM CHLORIDE, SODIUM LACTATE AND CALCIUM CHLORIDE 500 ML: 600; 310; 30; 20 INJECTION, SOLUTION INTRAVENOUS at 05:08

## 2024-08-11 NOTE — PT/OT/SLP PROGRESS
Physical Therapy      Patient Name:  Rosette Wood   MRN:  [id number]    Patient not seen today secondary to Other (Comment) (Pt not seen per pt request due to being up and moving all morning.). Will follow-up next treatment day.

## 2024-08-11 NOTE — NURSING
Old IV's was removed per policy, New IV administered to the R UA 22 gauge. Saline locked. Patient tolerated it well. All safety precautions secure. On going monitoring.

## 2024-08-11 NOTE — PLAN OF CARE
Problem: Adult Inpatient Plan of Care  Goal: Patient-Specific Goal (Individualized)  Outcome: Progressing     Problem: Pain Acute  Goal: Optimal Pain Control and Function  Outcome: Progressing     Problem: Wound  Goal: Optimal Coping  Outcome: Progressing   AAO x4, on room air, Nightly scheduled and prn medications administered per MAR. On going tele monitoring, ST, HR of 101-105 bpm sustained. Patient was educated in regards with proper emptying and cleaning of her colostomy bag. Bervalized understanding. All safety precautions secured.

## 2024-08-11 NOTE — PROGRESS NOTES
Heart rate on telemetry monitor 137-144       08/11/24 1149   Nurse Notification   Charge Nurse Notified? Yes   Bedside Nurse Notified? Yes   Name of Bedside Nurse Jessica   Nurse Notfication Method Secure Chat   Nurse Notified Of Other  (tachycardia)   Shift   Virtual Nurse - Patient Verbalized Approval Of VN Rounding;Camera Use   Safety/Activity   Activity Assistance Provided independent;other (see comments)  (In bathroom, denies cp/sob)

## 2024-08-11 NOTE — PROGRESS NOTES
"[facility]  General Surgery  Progress Note    Subjective:     Interval History:   Feeling well  Ambulating  Eating better  Ostomy putting out more      Post-Op Info:  Procedure(s) (LRB):  LAPAROTOMY, EXPLORATORY (N/A)  BIOPSY, LIVER (Left)  COLECTOMY, RIGHT (Right)  CREATION, ILEOSTOMY   5 Days Post-Op      Medications:  Continuous Infusions:  Scheduled Meds:   acetaminophen  650 mg Oral Q8H    amLODIPine  10 mg Oral Daily     PRN Meds:  Current Facility-Administered Medications:     dextrose 10%, 12.5 g, Intravenous, PRN    dextrose 10%, 25 g, Intravenous, PRN    hydrALAZINE, 10 mg, Intravenous, Q6H PRN    labetalol, 10 mg, Intravenous, Q6H PRN    melatonin, 6 mg, Oral, Nightly PRN    morphine, 2 mg, Intravenous, Q4H PRN    ondansetron, 4 mg, Intravenous, Q6H PRN    oxyCODONE, 5 mg, Oral, Q4H PRN    sodium chloride 0.9%, 10 mL, Intravenous, PRN     Objective:     Vital Signs (Most Recent):  Temp: 98.4 °F (36.9 °C) (08/11/24 1513)  Pulse: (!) 115 (08/11/24 1513)  Resp: 20 (08/11/24 1513)  BP: 129/88 (08/11/24 1513)  SpO2: 100 % (08/11/24 1513) Vital Signs (24h Range):  Temp:  [97.6 °F (36.4 °C)-98.4 °F (36.9 °C)] 98.4 °F (36.9 °C)  Pulse:  [105-123] 115  Resp:  [16-20] 20  SpO2:  [96 %-100 %] 100 %  BP: (129-168)/() 129/88       Intake/Output Summary (Last 24 hours) at 8/11/2024 1652  Last data filed at 8/11/2024 1543  Gross per 24 hour   Intake --   Output 1050 ml   Net -1050 ml       Physical Exam  Watery output per ostomy    Significant Labs:  CBC: No results for input(s): "WBC", "RBC", "HGB", "HCT", "PLT", "MCV", "MCH", "MCHC" in the last 48 hours.  CMP: No results for input(s): "GLU", "CALCIUM", "ALBUMIN", "PROT", "NA", "K", "CO2", "CL", "BUN", "CREATININE", "ALKPHOS", "ALT", "AST", "BILITOT" in the last 48 hours.    Significant Diagnostics:  None    Assessment/Plan:     Active Diagnoses:    Diagnosis Date Noted POA    PRINCIPAL PROBLEM:  Colonic intussusception [K56.1] 08/06/2024 Yes      Problems " Resolved During this Admission:   S/p right hemicolectomy  Trachy, will give LR bolus  Start imodium   Continue diet        Pedro Saxena MD  General Surgery  [facility]

## 2024-08-11 NOTE — PLAN OF CARE
PHYSICAL THERAPY NOTE          Patient Name: Adrianna Macias  Today's Date: 8/1/2024 08/01/24 1554   PT Last Visit   PT Visit Date 08/01/24   Assessment   Assessment PTA Milla reported that pt progressing well in PT & now will benefit from inc PT frequency to maximized rehab potential. Please see PT progress note today for details.   Plan   PT Frequency 2-3x/wk;3-5x/wk     Fam [place]      Problem: Pain Acute  Goal: Optimal Pain Control and Function  Outcome: Progressing     Problem: Infection  Goal: Absence of Infection Signs and Symptoms  Outcome: Progressing     Problem: Surgery Nonspecified  Goal: Effective Bowel Elimination  Outcome: Progressing     Problem: Ileostomy  Goal: Optimal Coping  Outcome: Progressing  Goal: Effective Bowel Elimination  Outcome: Progressing  Goal: Absence of Infection Signs and Symptoms  Outcome: Progressing     Problem: Hypertension Acute  Goal: Blood Pressure Within Desired Range  Outcome: Progressing

## 2024-08-12 LAB
ANION GAP SERPL CALC-SCNC: 12 MMOL/L (ref 8–16)
BUN SERPL-MCNC: 9 MG/DL (ref 6–20)
CALCIUM SERPL-MCNC: 9.1 MG/DL (ref 8.7–10.5)
CHLORIDE SERPL-SCNC: 102 MMOL/L (ref 95–110)
CO2 SERPL-SCNC: 22 MMOL/L (ref 23–29)
CREAT SERPL-MCNC: 0.5 MG/DL (ref 0.5–1.4)
EST. GFR  (NO RACE VARIABLE): >60 ML/MIN/1.73 M^2
GLUCOSE SERPL-MCNC: 106 MG/DL (ref 70–110)
MAGNESIUM SERPL-MCNC: 1.7 MG/DL (ref 1.6–2.6)
PHOSPHATE SERPL-MCNC: 3.9 MG/DL (ref 2.7–4.5)
POTASSIUM SERPL-SCNC: 3.8 MMOL/L (ref 3.5–5.1)
SODIUM SERPL-SCNC: 136 MMOL/L (ref 136–145)

## 2024-08-12 PROCEDURE — 63600175 PHARM REV CODE 636 W HCPCS

## 2024-08-12 PROCEDURE — 83735 ASSAY OF MAGNESIUM: CPT | Performed by: STUDENT IN AN ORGANIZED HEALTH CARE EDUCATION/TRAINING PROGRAM

## 2024-08-12 PROCEDURE — 84100 ASSAY OF PHOSPHORUS: CPT | Performed by: STUDENT IN AN ORGANIZED HEALTH CARE EDUCATION/TRAINING PROGRAM

## 2024-08-12 PROCEDURE — 25000003 PHARM REV CODE 250: Performed by: EMERGENCY MEDICINE

## 2024-08-12 PROCEDURE — 80048 BASIC METABOLIC PNL TOTAL CA: CPT | Performed by: STUDENT IN AN ORGANIZED HEALTH CARE EDUCATION/TRAINING PROGRAM

## 2024-08-12 PROCEDURE — 25000003 PHARM REV CODE 250

## 2024-08-12 PROCEDURE — 25000003 PHARM REV CODE 250: Performed by: STUDENT IN AN ORGANIZED HEALTH CARE EDUCATION/TRAINING PROGRAM

## 2024-08-12 PROCEDURE — 63600175 PHARM REV CODE 636 W HCPCS: Performed by: STUDENT IN AN ORGANIZED HEALTH CARE EDUCATION/TRAINING PROGRAM

## 2024-08-12 PROCEDURE — 21400001 HC TELEMETRY ROOM

## 2024-08-12 PROCEDURE — 36415 COLL VENOUS BLD VENIPUNCTURE: CPT | Performed by: STUDENT IN AN ORGANIZED HEALTH CARE EDUCATION/TRAINING PROGRAM

## 2024-08-12 PROCEDURE — 97530 THERAPEUTIC ACTIVITIES: CPT | Mod: CO

## 2024-08-12 RX ORDER — LOPERAMIDE HYDROCHLORIDE 2 MG/1
2 CAPSULE ORAL 3 TIMES DAILY
Status: DISCONTINUED | OUTPATIENT
Start: 2024-08-12 | End: 2024-08-13 | Stop reason: HOSPADM

## 2024-08-12 RX ADMIN — OXYCODONE 5 MG: 5 TABLET ORAL at 11:08

## 2024-08-12 RX ADMIN — ACETAMINOPHEN 650 MG: 325 TABLET ORAL at 06:08

## 2024-08-12 RX ADMIN — MORPHINE SULFATE 2 MG: 2 INJECTION, SOLUTION INTRAMUSCULAR; INTRAVENOUS at 09:08

## 2024-08-12 RX ADMIN — LOPERAMIDE HYDROCHLORIDE 2 MG: 2 CAPSULE ORAL at 03:08

## 2024-08-12 RX ADMIN — OXYCODONE 5 MG: 5 TABLET ORAL at 07:08

## 2024-08-12 RX ADMIN — ACETAMINOPHEN 650 MG: 325 TABLET ORAL at 01:08

## 2024-08-12 RX ADMIN — AMLODIPINE BESYLATE 10 MG: 5 TABLET ORAL at 09:08

## 2024-08-12 RX ADMIN — LABETALOL HYDROCHLORIDE 10 MG: 5 INJECTION INTRAVENOUS at 09:08

## 2024-08-12 RX ADMIN — MORPHINE SULFATE 2 MG: 2 INJECTION, SOLUTION INTRAMUSCULAR; INTRAVENOUS at 04:08

## 2024-08-12 RX ADMIN — OXYCODONE 5 MG: 5 TABLET ORAL at 06:08

## 2024-08-12 RX ADMIN — ACETAMINOPHEN 650 MG: 325 TABLET ORAL at 09:08

## 2024-08-12 RX ADMIN — OXYCODONE 5 MG: 5 TABLET ORAL at 01:08

## 2024-08-12 RX ADMIN — LOPERAMIDE HYDROCHLORIDE 2 MG: 2 CAPSULE ORAL at 09:08

## 2024-08-12 RX ADMIN — MORPHINE SULFATE 2 MG: 2 INJECTION, SOLUTION INTRAMUSCULAR; INTRAVENOUS at 12:08

## 2024-08-12 RX ADMIN — MELATONIN TAB 3 MG 6 MG: 3 TAB at 09:08

## 2024-08-12 RX ADMIN — OXYCODONE 5 MG: 5 TABLET ORAL at 03:08

## 2024-08-12 NOTE — PT/OT/SLP PROGRESS
Occupational Therapy  Missed Visit    Patient Name:  Rosette Wood   MRN:  [id number]    Patient not seen today secondary to Other (Comment) (10:07 - Nsg reports elevated BP and HR; just received medicine. Asked OT to return later.).     8/12/2024

## 2024-08-12 NOTE — PLAN OF CARE
SW met with pt to discuss d/c planning. Pt's plan for discharge is to discharge home with family. Pt's family will provide transportation home following discharge. Pt is open to discharge home with HH services. No needs for community resources were reported. Pt was encouraged to call with any questions or concerns. Pt verbalized understanding.     Due to pt's insurance, there have been no accepting HH agencies. SW expanded HH referral via [place].     SW will continue to follow.     Future Appointments   Date Time Provider Department Center   8/21/2024  1:20 PM Pedro Saxena MD [facility]   10/8/2024  2:30 PM Sam Langford MD [facility] O[facility]         08/12/24 7752   Discharge Reassessment   Assessment Type Discharge Planning Reassessment   Did the patient's condition or plan change since previous assessment? No   Discharge Plan discussed with: Patient   Communicated HANK with patient/caregiver Yes   Discharge Plan A Home with family;Home Health   DME Needed Upon Discharge    (TBD)   Transition of Care Barriers None   Why the patient remains in the hospital Requires continued medical care   Post-Acute Status   Post-Acute Authorization Home Health   Home Health Status Referrals Sent   Coverage Medicaid   Discharge Delays None known at this time

## 2024-08-12 NOTE — PLAN OF CARE
Problem: Pain Acute  Goal: Optimal Pain Control and Function  Outcome: Progressing     Problem: Wound  Goal: Optimal Coping  Outcome: Progressing     Problem: Wound  Goal: Absence of Infection Signs and Symptoms  Outcome: Progressing     Problem: Infection  Goal: Absence of Infection Signs and Symptoms  Outcome: Progressing

## 2024-08-12 NOTE — PT/OT/SLP PROGRESS
"Physical Therapy      Patient Name:  Rosette Wood   MRN:  [id number]    1449 -Patient declined OOB and in tx at this time secondary to being tired and "wanting to sleep."   Will follow-up next tx date.    "

## 2024-08-12 NOTE — ASSESSMENT & PLAN NOTE
Rosette Wood is a 53 y.o. female who presents with abdominal pain with associated diarrhea that has now evolved into PO intolerance with frequent emesis. Imaging demonstrated colonic intussusception at the hepatic flexure which also appears to be present on a non-con scan ordered two weeks ago but was not commented on in the final read. She clearly has one large lesion in the liver and in the context of colonic intussuception is likely reflective of a primary colon cancer leading to the intussusception with hepatic metastasis and possibly pulmonary metastasis as well. On my review there appear to be at least two other hepatic lesions. Ms. Wood presenting at least partially obstructed and required operation. S/p open extended R colectomy with end ileostomy and biopsy of L liver lesion on 8/6. Fluid bolus for tachycardia on 8/11.     -Reg diet  -No additional fluid bolus at this time  -Amlodipine 5mg added for hypertension  -Imodium  -Follow up liver biopsies  -CEA <1.7  -PT/OT  -MMPC  -Dispo: approaching dc  -DVT ppx

## 2024-08-12 NOTE — PROGRESS NOTES
[facility] Surg  General Surgery  Progress Note    Subjective:     History of Present Illness:  Rosette Wood is a 53 y.o. female with no known medical history who presented to the ED with abdominal pain, nausea, vomiting, and diarrhea. Her pain has been diffuse and started mid July. She does endorse rare hematochezia with diarrhea during this time. Most of the problems with vomiting began after starting colonoscopy prep last week. She is able to tolerate PO at this time, but this is limited by nausea and episodic emesis. Imaging obtained showed colonic intussucption at the hepatic flexure. Additionally there was a liver mass measuring 7 cm in largest dimension in the left liver and an enlarging pulmonary nodule in the right lung. She has never had a prior colonoscopy. Surgical history notable for removal of an ectopic pregnancy. She is not on any blood thinners.         Post-Op Info:  Procedure(s) (LRB):  LAPAROTOMY, EXPLORATORY (N/A)  BIOPSY, LIVER (Left)  COLECTOMY, RIGHT (Right)  CREATION, ILEOSTOMY   6 Days Post-Op     Interval History: Continued mild tachycardia overnight. Pain well controlled. Tolerating reg diet without issue. Having adequate urine output.    Medications:  Continuous Infusions:  Scheduled Meds:   acetaminophen  650 mg Oral Q8H    amLODIPine  10 mg Oral Daily    loperamide  2 mg Oral Daily     PRN Meds:  Current Facility-Administered Medications:     dextrose 10%, 12.5 g, Intravenous, PRN    dextrose 10%, 25 g, Intravenous, PRN    hydrALAZINE, 10 mg, Intravenous, Q6H PRN    labetalol, 10 mg, Intravenous, Q6H PRN    melatonin, 6 mg, Oral, Nightly PRN    morphine, 2 mg, Intravenous, Q4H PRN    ondansetron, 4 mg, Intravenous, Q6H PRN    oxyCODONE, 5 mg, Oral, Q4H PRN    sodium chloride 0.9%, 10 mL, Intravenous, PRN     Review of patient's allergies indicates:  No Known Allergies  Objective:     Vital Signs (Most Recent):  Temp: 97.8 °F (36.6 °C) (08/12/24 0720)  Pulse: 109 (08/12/24  0720)  Resp: 20 (08/12/24 0720)  BP: 134/83 (08/12/24 0720)  SpO2: 98 % (08/12/24 0720) Vital Signs (24h Range):  Temp:  [97.6 °F (36.4 °C)-98.6 °F (37 °C)] 97.8 °F (36.6 °C)  Pulse:  [] 109  Resp:  [16-20] 20  SpO2:  [98 %-100 %] 98 %  BP: (129-163)/(83-94) 134/83     Weight: 50 kg (110 lb 3.7 oz)  Body mass index is 20.83 kg/m².    Intake/Output - Last 3 Shifts         08/10 0700 08/11 0659 08/11 0700 08/12 0659 08/12 0700 08/13 0659    P.O.       Total Intake(mL/kg)       Urine (mL/kg/hr) 0 (0) 550 (0.5)     Stool 50 650     Total Output 50 1200     Net -50 -1200            Urine Occurrence 3 x 3 x              Physical Exam  Vitals and nursing note reviewed.   Constitutional:       General: She is not in acute distress.     Appearance: She is not toxic-appearing.   HENT:      Head: Normocephalic and atraumatic.   Eyes:      General: No scleral icterus.     Extraocular Movements: Extraocular movements intact.   Cardiovascular:      Rate and Rhythm: Normal rate and regular rhythm.   Pulmonary:      Effort: Pulmonary effort is normal. No respiratory distress.   Abdominal:      General: There is no distension.      Palpations: Abdomen is soft.      Tenderness: There is abdominal tenderness (appropriately). There is no guarding or rebound.      Comments: Midline incision dressing removed, cdi  Ileostomy pink, patent, with thickened output   Skin:     General: Skin is warm and dry.   Neurological:      General: No focal deficit present.      Mental Status: She is alert and oriented to person, place, and time.          Significant Labs:  I have reviewed all pertinent lab results within the past 24 hours.    Significant Diagnostics:  I have reviewed all pertinent imaging results/findings within the past 24 hours.  Assessment/Plan:     * Colonic intussusception  Rosette Wood is a 53 y.o. female who presents with abdominal pain with associated diarrhea that has now evolved into PO intolerance with frequent  emesis. Imaging demonstrated colonic intussusception at the hepatic flexure which also appears to be present on a non-con scan ordered two weeks ago but was not commented on in the final read. She clearly has one large lesion in the liver and in the context of colonic intussuception is likely reflective of a primary colon cancer leading to the intussusception with hepatic metastasis and possibly pulmonary metastasis as well. On my review there appear to be at least two other hepatic lesions. Ms. Wood presenting at least partially obstructed and required operation. S/p open extended R colectomy with end ileostomy and biopsy of L liver lesion on 8/6. Fluid bolus for tachycardia on 8/11.     -Reg diet  -No additional fluid bolus at this time  -Amlodipine 5mg added for hypertension  -Imodium  -Follow up liver biopsies  -CEA <1.7  -PT/OT  -MMPC  -Dispo: approaching dc  -DVT ppx        Edwar Singh MD  General Surgery  [facility] Surg

## 2024-08-12 NOTE — PT/OT/SLP PROGRESS
Occupational Therapy   Treatment    Name: Rosette Wood  MRN: [id number]  Admitting Diagnosis:  Colonic intussusception  6 Days Post-Op    Recommendations:     Discharge Recommendations: Low Intensity Therapy  Discharge Equipment Recommendations:  shower chair  Barriers to discharge:  None    Assessment:     Rosette Wood is a 53 y.o. female with a medical diagnosis of Colonic intussusception. Performance deficits affecting function are weakness, impaired endurance, impaired functional mobility, pain, impaired skin.     Rehab Prognosis:  Good; patient would benefit from acute skilled OT services to address these deficits and reach maximum level of function.       Plan:     Patient to be seen 3 x/week to address the above listed problems via self-care/home management, therapeutic exercises, therapeutic activities  Plan of Care Expires:    Plan of Care Reviewed with: patient    Subjective     Chief Complaint: none  Patient/Family Comments/goals: return to PLOF  Pain/Comfort:  Pain Rating 1: 7/10  Location 1: abdomen    Objective:     Communicated with: nurseLuz prior to session.  Patient found up in chair with colostomy upon OT entry to room.    General Precautions: Standard, fall    Orthopedic Precautions: (abdominal precautions: no lifting>10 lbs)  Braces: N/A  Respiratory Status: Room air    [facility] 6 Click ADL: 22    Treatment & Education:  Educated on / discussed:  Purpose/role of OT  ADLs at home  Colostomy care (now completing with independence)  DME needs -- shower chair (reviewed various models online, patient will purchase)  All questions answered in OT scope    Patient left up in chair with all lines intact, call button in reach, and nsg present    GOALS:   Multidisciplinary Problems       Occupational Therapy Goals          Problem: Occupational Therapy    Goal Priority Disciplines Outcome Interventions   Occupational Therapy Goal     OT, PT/OT Progressing    Description: Goals to be met by: 8/15/2024      Patient will increase functional independence with ADLs by performing:    UE Dressing with [place].  LE Dressing with [place] and Assistive Devices as needed.  Grooming while standing at sink with [place].  Toileting from toilet with [place] for hygiene and clothing management.   Supine to sit with [place].  Toilet transfer to toilet with [place].  Upper extremity AROM exercise program x10 reps per handout, with independence.                         Time Tracking:     OT Date of Treatment: 08/12/24  OT Start Time: 1158  OT Stop Time: 1215  OT Total Time (min): 17 min    Billable Minutes:Therapeutic Activity 17    8/12/2024

## 2024-08-12 NOTE — SUBJECTIVE & OBJECTIVE
Interval History: Continued mild tachycardia overnight. Pain well controlled. Tolerating reg diet without issue. Having adequate urine output.    Medications:  Continuous Infusions:  Scheduled Meds:   acetaminophen  650 mg Oral Q8H    amLODIPine  10 mg Oral Daily    loperamide  2 mg Oral Daily     PRN Meds:  Current Facility-Administered Medications:     dextrose 10%, 12.5 g, Intravenous, PRN    dextrose 10%, 25 g, Intravenous, PRN    hydrALAZINE, 10 mg, Intravenous, Q6H PRN    labetalol, 10 mg, Intravenous, Q6H PRN    melatonin, 6 mg, Oral, Nightly PRN    morphine, 2 mg, Intravenous, Q4H PRN    ondansetron, 4 mg, Intravenous, Q6H PRN    oxyCODONE, 5 mg, Oral, Q4H PRN    sodium chloride 0.9%, 10 mL, Intravenous, PRN     Review of patient's allergies indicates:  No Known Allergies  Objective:     Vital Signs (Most Recent):  Temp: 97.8 °F (36.6 °C) (08/12/24 0720)  Pulse: 109 (08/12/24 0720)  Resp: 20 (08/12/24 0720)  BP: 134/83 (08/12/24 0720)  SpO2: 98 % (08/12/24 0720) Vital Signs (24h Range):  Temp:  [97.6 °F (36.4 °C)-98.6 °F (37 °C)] 97.8 °F (36.6 °C)  Pulse:  [] 109  Resp:  [16-20] 20  SpO2:  [98 %-100 %] 98 %  BP: (129-163)/(83-94) 134/83     Weight: 50 kg (110 lb 3.7 oz)  Body mass index is 20.83 kg/m².    Intake/Output - Last 3 Shifts         08/10 0700  08/11 0659 08/11 0700  08/12 0659 08/12 0700  08/13 0659    P.O.       Total Intake(mL/kg)       Urine (mL/kg/hr) 0 (0) 550 (0.5)     Stool 50 650     Total Output 50 1200     Net -50 -1200            Urine Occurrence 3 x 3 x              Physical Exam  Vitals and nursing note reviewed.   Constitutional:       General: She is not in acute distress.     Appearance: She is not toxic-appearing.   HENT:      Head: Normocephalic and atraumatic.   Eyes:      General: No scleral icterus.     Extraocular Movements: Extraocular movements intact.   Cardiovascular:      Rate and Rhythm: Normal rate and regular rhythm.   Pulmonary:      Effort: Pulmonary effort is  normal. No respiratory distress.   Abdominal:      General: There is no distension.      Palpations: Abdomen is soft.      Tenderness: There is abdominal tenderness (appropriately). There is no guarding or rebound.      Comments: Midline incision dressing removed, cdi  Ileostomy pink, patent, with thickened output   Skin:     General: Skin is warm and dry.   Neurological:      General: No focal deficit present.      Mental Status: She is alert and oriented to person, place, and time.          Significant Labs:  I have reviewed all pertinent lab results within the past 24 hours.    Significant Diagnostics:  I have reviewed all pertinent imaging results/findings within the past 24 hours.

## 2024-08-13 VITALS
HEIGHT: 61 IN | OXYGEN SATURATION: 99 % | SYSTOLIC BLOOD PRESSURE: 140 MMHG | HEART RATE: 105 BPM | WEIGHT: 110.25 LBS | BODY MASS INDEX: 20.82 KG/M2 | TEMPERATURE: 98 F | RESPIRATION RATE: 20 BRPM | DIASTOLIC BLOOD PRESSURE: 92 MMHG

## 2024-08-13 PROBLEM — K56.1 COLONIC INTUSSUSCEPTION: Status: RESOLVED | Noted: 2024-08-06 | Resolved: 2024-08-13

## 2024-08-13 PROCEDURE — 25000003 PHARM REV CODE 250

## 2024-08-13 PROCEDURE — 63600175 PHARM REV CODE 636 W HCPCS: Performed by: STUDENT IN AN ORGANIZED HEALTH CARE EDUCATION/TRAINING PROGRAM

## 2024-08-13 PROCEDURE — 97116 GAIT TRAINING THERAPY: CPT | Mod: CQ

## 2024-08-13 PROCEDURE — 25000003 PHARM REV CODE 250: Performed by: STUDENT IN AN ORGANIZED HEALTH CARE EDUCATION/TRAINING PROGRAM

## 2024-08-13 RX ORDER — OXYCODONE AND ACETAMINOPHEN 5; 325 MG/1; MG/1
1 TABLET ORAL EVERY 6 HOURS PRN
Qty: 12 TABLET | Refills: 0 | Status: SHIPPED | OUTPATIENT
Start: 2024-08-13

## 2024-08-13 RX ORDER — LANOLIN ALCOHOL/MO/W.PET/CERES
400 CREAM (GRAM) TOPICAL ONCE
Status: COMPLETED | OUTPATIENT
Start: 2024-08-13 | End: 2024-08-13

## 2024-08-13 RX ORDER — KETOROLAC TROMETHAMINE 10 MG/1
10 TABLET, FILM COATED ORAL EVERY 6 HOURS PRN
Qty: 12 TABLET | Refills: 0 | Status: SHIPPED | OUTPATIENT
Start: 2024-08-13

## 2024-08-13 RX ORDER — POTASSIUM CHLORIDE 20 MEQ/1
20 TABLET, EXTENDED RELEASE ORAL ONCE
Status: COMPLETED | OUTPATIENT
Start: 2024-08-13 | End: 2024-08-13

## 2024-08-13 RX ADMIN — OXYCODONE 5 MG: 5 TABLET ORAL at 04:08

## 2024-08-13 RX ADMIN — AMLODIPINE BESYLATE 10 MG: 5 TABLET ORAL at 08:08

## 2024-08-13 RX ADMIN — MORPHINE SULFATE 2 MG: 2 INJECTION, SOLUTION INTRAMUSCULAR; INTRAVENOUS at 06:08

## 2024-08-13 RX ADMIN — POTASSIUM CHLORIDE 20 MEQ: 1500 TABLET, EXTENDED RELEASE ORAL at 08:08

## 2024-08-13 RX ADMIN — MORPHINE SULFATE 2 MG: 2 INJECTION, SOLUTION INTRAMUSCULAR; INTRAVENOUS at 01:08

## 2024-08-13 RX ADMIN — ACETAMINOPHEN 650 MG: 325 TABLET ORAL at 06:08

## 2024-08-13 RX ADMIN — LOPERAMIDE HYDROCHLORIDE 2 MG: 2 CAPSULE ORAL at 08:08

## 2024-08-13 RX ADMIN — MAGNESIUM OXIDE TAB 400 MG (241.3 MG ELEMENTAL MG) 400 MG: 400 (241.3 MG) TAB at 08:08

## 2024-08-13 NOTE — PLAN OF CARE
08/13/24 1433   AVS Confirmation   Discharge instructions and AVS provided to and reviewed with patient and/or significant other. Yes     Discharge orders noted. Additional clinical references attached.    Patient's discharge instructions given by bedside RN and reviewed via this VN with patient.    Education provided on new medication, diagnosis, and follow-up appointments.    All questions answered. Teach back method used. Patient verbalized understanding.    Transport to [facility] requested. Floor nurse notified.

## 2024-08-13 NOTE — PROGRESS NOTES
[facility]                    Pharmacy       Discharge Medication Education    Patient ACCEPTED medication education. Pharmacy has provided education on the name, indication, and possible side effects of the medication(s) prescribed, using teach-back method.     The following medications have also been discussed, during this admission.        Medication List        START taking these medications      oxyCODONE-acetaminophen 5-325 mg per tablet  Commonly known as: PERCOCET  Take 1 tablet by mouth every 6 (six) hours as needed for Pain.            CONTINUE taking these medications      AMOXICILLIN ORAL     dicyclomine 10 MG capsule  Commonly known as: BENTYL  Take 1 capsule (10 mg total) by mouth 4 (four) times daily before meals and nightly.     metroNIDAZOLE 500 MG tablet  Commonly known as: FLAGYL  Take 1 tablet (500 mg total) by mouth 2 (two) times a day.     ondansetron 4 MG Tbdl  Commonly known as: ZOFRAN-ODT  Take 1 tablet (4 mg total) by mouth every 8 (eight) hours as needed (Nausea).     orphenadrine 100 mg tablet  Commonly known as: NORFLEX  Take 1 tablet (100 mg total) by mouth nightly as needed (muscle cramps).            ASK your doctor about these medications      acetaminophen 500 MG tablet  Commonly known as: TYLENOL  Take 1 tablet (500 mg total) by mouth every 6 (six) hours as needed for Pain.     bacitracin 500 unit/gram Oint  Apply topically 2 (two) times daily.     ferrous sulfate 325 (65 FE) MG EC tablet  Take 1 tablet (325 mg total) by mouth 3 (three) times daily with meals.     ibuprofen 800 MG tablet  Commonly known as: ADVIL,MOTRIN  Take 1 tablet (800 mg total) by mouth every 6 (six) hours as needed for Pain.     naproxen 500 MG tablet  Commonly known as: NAPROSYN  Take 1 tablet (500 mg total) by mouth 2 (two) times daily with meals.               Where to Get Your Medications        These medications were sent to [facility]  [address],  [place] [address]      Hours: Mon-Fri, 8a-5:30p Phone: [phone]   oxyCODONE-acetaminophen 5-325 mg per tablet          Thank you  Crystal Davila, PharmD  [phone]

## 2024-08-13 NOTE — ASSESSMENT & PLAN NOTE
Rosette Wood is a 53 y.o. female who presents with abdominal pain with associated diarrhea that has now evolved into PO intolerance with frequent emesis. Imaging demonstrated colonic intussusception at the hepatic flexure which also appears to be present on a non-con scan ordered two weeks ago but was not commented on in the final read. She clearly has one large lesion in the liver and in the context of colonic intussuception is likely reflective of a primary colon cancer leading to the intussusception with hepatic metastasis and possibly pulmonary metastasis as well. On my review there appear to be at least two other hepatic lesions. Ms. Wood presenting at least partially obstructed and required operation. S/p open extended R colectomy with end ileostomy and biopsy of L liver lesion on 8/6. Fluid bolus for tachycardia on 8/11.     -Reg diet  -Amlodipine 5mg added for hypertension  -Imodium 2mg tid  -Follow up liver biopsies  -CEA <1.7  -PT/OT  -MMPC  -Dispo: dc today with outpatient referral to PT/OT  -DVT ppx

## 2024-08-13 NOTE — PT/OT/SLP PROGRESS
Occupational Therapy  Visit Attempt    Patient Name:  Rosette Wood   MRN:  [id number]    Patient not seen today secondary to Patient fatigue, Other (Comment) (8:48 - Patient reported she has been up already this AM (ambulating in room and to restroom). Declining OT participation at this time.).     8/13/2024

## 2024-08-13 NOTE — PLAN OF CARE
D/c orders noted.     SW met with pt to discuss d/c plans. Pt will be discharging home with family and will participate in outpatient therapy. Pt is agreeable to participate in outpatient therapy. Due to pt's insurance, no  agencies have accepted. Pt's family will provide transportation home following discharge.     Pt is cleared to go from CM standpoint.     Future Appointments   Date Time Provider Department Center   8/21/2024  1:20 PM Pedro Saxena MD [facility]   10/8/2024  2:30 PM Sam Langford MD [facility]         08/13/24 1102   Final Note   Assessment Type Final Discharge Note   Anticipated Discharge Disposition Home  (Pt will participate in outpatient therapy upon d/c.)   Post-Acute Status   Post-Acute Authorization Home Health   Home Health Status Discharge Plan Changed  (Due to pt's insurance, no  agencies have accepted.)   Coverage Medicaid   Discharge Delays None known at this time

## 2024-08-13 NOTE — PROGRESS NOTES
[facility]  General Surgery  Progress Note    Subjective:     History of Present Illness:  Rosette Wood is a 53 y.o. female with no known medical history who presented to the ED with abdominal pain, nausea, vomiting, and diarrhea. Her pain has been diffuse and started mid July. She does endorse rare hematochezia with diarrhea during this time. Most of the problems with vomiting began after starting colonoscopy prep last week. She is able to tolerate PO at this time, but this is limited by nausea and episodic emesis. Imaging obtained showed colonic intussucption at the hepatic flexure. Additionally there was a liver mass measuring 7 cm in largest dimension in the left liver and an enlarging pulmonary nodule in the right lung. She has never had a prior colonoscopy. Surgical history notable for removal of an ectopic pregnancy. She is not on any blood thinners.         Post-Op Info:  Procedure(s) (LRB):  LAPAROTOMY, EXPLORATORY (N/A)  BIOPSY, LIVER (Left)  COLECTOMY, RIGHT (Right)  CREATION, ILEOSTOMY   7 Days Post-Op     Interval History: NAEON. Unable to get home health due to insurance. Tolerating diet without issue. Ostomy continues to be productive with thickened output    Medications:  Continuous Infusions:  Scheduled Meds:   acetaminophen  650 mg Oral Q8H    amLODIPine  10 mg Oral Daily    loperamide  2 mg Oral TID     PRN Meds:  Current Facility-Administered Medications:     dextrose 10%, 12.5 g, Intravenous, PRN    dextrose 10%, 25 g, Intravenous, PRN    hydrALAZINE, 10 mg, Intravenous, Q6H PRN    labetalol, 10 mg, Intravenous, Q6H PRN    melatonin, 6 mg, Oral, Nightly PRN    morphine, 2 mg, Intravenous, Q4H PRN    ondansetron, 4 mg, Intravenous, Q6H PRN    oxyCODONE, 5 mg, Oral, Q4H PRN    sodium chloride 0.9%, 10 mL, Intravenous, PRN     Review of patient's allergies indicates:  No Known Allergies  Objective:     Vital Signs (Most Recent):  Temp: 98 °F (36.7 °C) (08/13/24 0800)  Pulse: 101 (08/13/24  0800)  Resp: 18 (08/13/24 0800)  BP: (!) 174/99 (08/13/24 0800)  SpO2: 99 % (08/13/24 0800) Vital Signs (24h Range):  Temp:  [97.5 °F (36.4 °C)-98.1 °F (36.7 °C)] 98 °F (36.7 °C)  Pulse:  [] 101  Resp:  [16-20] 18  SpO2:  [98 %-100 %] 99 %  BP: (136-174)/(83-99) 174/99     Weight: 50 kg (110 lb 3.7 oz)  Body mass index is 20.83 kg/m².    Intake/Output - Last 3 Shifts         08/11 0700 08/12 0659 08/12 0700 08/13 0659 08/13 0700 08/14 0659    P.O.  60     Total Intake(mL/kg)  60 (1.2)     Urine (mL/kg/hr) 550 (0.5)      Stool 650      Total Output 1200      Net -1200 +60            Urine Occurrence 3 x      Stool Occurrence  0 x              Physical Exam  Vitals and nursing note reviewed.   Constitutional:       General: She is not in acute distress.     Appearance: She is not toxic-appearing.   HENT:      Head: Normocephalic and atraumatic.   Eyes:      General: No scleral icterus.     Extraocular Movements: Extraocular movements intact.   Cardiovascular:      Rate and Rhythm: Normal rate and regular rhythm.   Pulmonary:      Effort: Pulmonary effort is normal. No respiratory distress.   Abdominal:      General: There is no distension.      Palpations: Abdomen is soft.      Tenderness: There is abdominal tenderness (appropriately). There is no guarding or rebound.      Comments: Midline incision dressing removed, cdi  Ileostomy pink, patent, with thickened output   Skin:     General: Skin is warm and dry.   Neurological:      General: No focal deficit present.      Mental Status: She is alert and oriented to person, place, and time.          Significant Labs:  I have reviewed all pertinent lab results within the past 24 hours.    Significant Diagnostics:  I have reviewed all pertinent imaging results/findings within the past 24 hours.  Assessment/Plan:     No notes have been filed under this hospital service.  Service: General Surgery      Edwar Singh MD  General Surgery  [facility] Surg

## 2024-08-13 NOTE — DISCHARGE INSTRUCTIONS
POSTOPERATIVE INSTRUCTIONS FOLLOWING SURGERY    The following are post-operative instructions that will help you to recover from your surgery. Please read over these instructions carefully and contact us if we can answer any of your questions or concerns.    New Medication  You will be given imodium to slow your ostomy output. This can be found over the counter, but you will be supplied with some for discharge. Continue taking imodium 2mg three times daily.     Dressing  You do not need a dressing on your staples. They will be removed in clinic. You are okay to shower and pat the incision dry.    Activity  You should be able to return to your regular activities 2-3 days after your surgery but some muscle / incision soreness is normal.   Do not engage in strenuous activities in which you use your upper body such as: golf, tennis, aerobics, washing windows, raking the yard, mopping, vacuuming or heavy lifting (e.g children). Gentle activity, including walking and stairs, are ok as tolerated. No heavy lifting > 10-15 pounds until assessed at follow up office visit.    Medication for pain  You may find that over the counter pain medications may be sufficient for your pain.  You will be given a prescription for pain medication for more severe pain. You should not drive or operate machinery while taking these. Please take narcotics with food. Narcotics can cause, or worsen, constipation. You will need to increase your fluid intake, eat high fiber foods (such as fruits and bran) and make sure that you are up and walking. You may need to take an over the counter stool softener or miralax for constipation.    Please call the office to report the following:  - Temperature greater than 101 degrees  - Discharge or bad odor from the wound  - Excessive bleeding, such as bloody dressing or extreme bruising  - Redness at incision and/or drain sites  - Swelling or buildup of fluid around incision  - Shortness of breath    Additional  information  You will be following up for a post-operative wound check and visit in the next 1-2 weeks. If this appointment has not previously been scheduled, please call office (number below) to schedule as soon as possible.    Should you have any concerns or questions, please call:  1. The [facility]  ([phone]) after business hours  2. Go the the Emergency Department if you feel you need urgent attention.

## 2024-08-13 NOTE — DISCHARGE SUMMARY
[facility]  General Surgery  Discharge Summary      Patient Name: Rosette Wood  MRN: [id number]  Admission Date: 8/5/2024  Hospital Length of Stay: 7 days  Discharge Date and Time:  08/13/2024 10:51 AM  Attending Physician: Pedro Saxena MD   Discharging Provider: Edwar Singh MD  Primary Care Provider: Ninfa Hurley MD    HPI:   Rosette Wood is a 53 y.o. female with no known medical history who presented to the ED with abdominal pain, nausea, vomiting, and diarrhea. Her pain has been diffuse and started mid July. She does endorse rare hematochezia with diarrhea during this time. Most of the problems with vomiting began after starting colonoscopy prep last week. She is able to tolerate PO at this time, but this is limited by nausea and episodic emesis. Imaging obtained showed colonic intussucption at the hepatic flexure. Additionally there was a liver mass measuring 7 cm in largest dimension in the left liver and an enlarging pulmonary nodule in the right lung. She has never had a prior colonoscopy. Surgical history notable for removal of an ectopic pregnancy. She is not on any blood thinners.         Procedure(s) (LRB):  LAPAROTOMY, EXPLORATORY (N/A)  BIOPSY, LIVER (Left)  COLECTOMY, RIGHT (Right)  CREATION, ILEOSTOMY      Indwelling Lines/Drains at time of discharge:   Lines/Drains/Airways       Drain  Duration                  Ileostomy 08/06/24 RLQ 7 days                  Hospital Course: Ms. Wood was admitted to the general surgery service and underwent open extended right hemicolectomy with end ileostomy and biopsies of liver lesion on 8/6 without issue. Her course was relatively uncomplicated with some new onset HTN that resolved with amlodipine 5mg while inpatient. She was able to tolerate a regular diet without issue and ostomy functioning well. She was able to progress with PT/OT and will undergo outpatient therapy. On 8/13 she is stable for discharge with follow in clinic in 1  weeks. Pathology still pending.    Goals of Care Treatment Preferences:  Code Status: Full Code      Consults:   Consults (From admission, onward)          Status Ordering Provider     Inpatient consult to Registered Dietitian/Nutritionist  Once        Provider:  (Not yet assigned)    Completed NARCISO BLANCO     Inpatient consult to Social Work  Once        Provider:  (Not yet assigned)    Completed NARCISO BLANCO     Inpatient consult to Social Work/Case Management  Once        Provider:  (Not yet assigned)    Completed NARCISO BLANCO            Significant Diagnostic Studies: N/A    Pending Diagnostic Studies:       Procedure Component Value Units Date/Time    Specimen to Pathology, Surgery General Surgery [[id number]] Collected: 08/06/24 1633    Order Status: Sent Lab Status: In process Updated: 08/07/24 0737    Specimen: Tissue           Final Active Diagnoses:      Problems Resolved During this Admission:    Diagnosis Date Noted Date Resolved POA    PRINCIPAL PROBLEM:  Colonic intussusception [K56.1] 08/06/2024 08/13/2024 Yes      Discharged Condition: good    Disposition: Home or Self Care    Follow Up:   Follow-up Information       Narciso Blanco MD Follow up on 8/21/2024.    Specialties: Surgery, General Surgery  Contact information:  [address]  [address]  [place], [place] [id number] [phone]                           Patient Instructions:      Ambulatory referral/consult to Physical/Occupational Therapy   Standing Status: Future   Referral Priority: Routine Referral Type: Physical Medicine   Referral Reason: Specialty Services Required   Number of Visits Requested: 1     Diet Adult Regular     No dressing needed     Notify your health care provider if you experience any of the following:  temperature >100.4     Notify your health care provider if you experience any of the following:  persistent nausea and vomiting or diarrhea     Notify your health care provider if you experience  any of the following:  severe uncontrolled pain     Notify your health care provider if you experience any of the following:  redness, tenderness, or signs of infection (pain, swelling, redness, odor or green/yellow discharge around incision site)     Notify your health care provider if you experience any of the following:  difficulty breathing or increased cough     Notify your health care provider if you experience any of the following:  severe persistent headache     Notify your health care provider if you experience any of the following:  worsening rash     Notify your health care provider if you experience any of the following:  persistent dizziness, light-headedness, or visual disturbances     Notify your health care provider if you experience any of the following:  increased confusion or weakness     Activity as tolerated     Medications:  Reconciled Home Medications:      Medication List        START taking these medications      oxyCODONE-acetaminophen 5-325 mg per tablet  Commonly known as: PERCOCET  Take 1 tablet by mouth every 6 (six) hours as needed for Pain.            CONTINUE taking these medications      AMOXICILLIN ORAL  Take by mouth 2 (two) times a day.     dicyclomine 10 MG capsule  Commonly known as: BENTYL  Take 1 capsule (10 mg total) by mouth 4 (four) times daily before meals and nightly.     metroNIDAZOLE 500 MG tablet  Commonly known as: FLAGYL  Take 1 tablet (500 mg total) by mouth 2 (two) times a day.     ondansetron 4 MG Tbdl  Commonly known as: ZOFRAN-ODT  Take 1 tablet (4 mg total) by mouth every 8 (eight) hours as needed (Nausea).     orphenadrine 100 mg tablet  Commonly known as: NORFLEX  Take 1 tablet (100 mg total) by mouth nightly as needed (muscle cramps).            ASK your doctor about these medications      acetaminophen 500 MG tablet  Commonly known as: TYLENOL  Take 1 tablet (500 mg total) by mouth every 6 (six) hours as needed for Pain.     bacitracin 500 unit/gram  Oint  Apply topically 2 (two) times daily.     ferrous sulfate 325 (65 FE) MG EC tablet  Take 1 tablet (325 mg total) by mouth 3 (three) times daily with meals.     ibuprofen 800 MG tablet  Commonly known as: ADVIL,MOTRIN  Take 1 tablet (800 mg total) by mouth every 6 (six) hours as needed for Pain.     naproxen 500 MG tablet  Commonly known as: NAPROSYN  Take 1 tablet (500 mg total) by mouth 2 (two) times daily with meals.            Time spent on the discharge of patient: 10 minutes    Edwar Singh MD  General Surgery  [place] [facility]

## 2024-08-13 NOTE — PT/OT/SLP PROGRESS
Physical Therapy Treatment    Patient Name:  Rosette Wood   MRN:  [id number]    Recommendations:     Discharge Recommendations: Low Intensity Therapy  Discharge Equipment Recommendations: walker, rolling (may assist pt for recovery - support, balance, and decrease pain during mobility)  Barriers to discharge: None    Assessment:     Rosette Wood is a 53 y.o. female admitted with a medical diagnosis of Colonic intussusception.  She presents with the following impairments/functional limitations: weakness, impaired endurance, impaired functional mobility, gait instability, pain, impaired skin Pt would continue to benefit from P.T. To address impairments listed above.  .    Rehab Prognosis: Fair; patient would benefit from acute skilled PT services to address these deficits and reach maximum level of function.    Recent Surgery: Procedure(s) (LRB):  LAPAROTOMY, EXPLORATORY (N/A)  BIOPSY, LIVER (Left)  COLECTOMY, RIGHT (Right)  CREATION, ILEOSTOMY 7 Days Post-Op    Plan:     During this hospitalization, patient to be seen 5 x/week to address the identified rehab impairments via gait training, therapeutic activities, therapeutic exercises, neuromuscular re-education and progress toward the following goals:    Plan of Care Expires:  09/07/24    Subjective       Patient/Family Comments/goals: Pt agreed to tx  Pain/Comfort:  Pain Rating 1: 5/10  Location - Orientation 1: generalized  Location 1: abdomen  Pain Addressed 1: Pre-medicate for activity, Reposition, Distraction  Pain Rating Post-Intervention 1: 5/10      Objective:     Communicated with RN prior to session.  Patient found sitting edge of bed with colostomy upon PT entry to room.     General Precautions: Standard, fall  Orthopedic Precautions:  (abd sx = no lifting > 10 #)  Braces: N/A  Respiratory Status: Room air     Functional Mobility:  Transfers:     Sit to Stand:  supervision and stand by assistance with no AD  Gait: 100ft x 2 without A.D. and CGA/SBA.   Pt ambulates with decreased caleb, guarded secondary to abdominal pain with mild trunk flexion. No LOB  Balance: sitting good, standing good-, gait good-/fair      AM-PAC 6 CLICK MOBILITY  Turning over in bed (including adjusting bedclothes, sheets and blankets)?: 4  Sitting down on and standing up from a chair with arms (e.g., wheelchair, bedside commode, etc.): 4  Moving from lying on back to sitting on the side of the bed?: 4  Moving to and from a bed to a chair (including a wheelchair)?: 3  Need to walk in hospital room?: 3  Climbing 3-5 steps with a railing?: 3  Basic Mobility Total Score: 21       Treatment & Education:  Pt found sitting EOB getting dressed for discharge today.  Gait training as above  Stair training:  Pt climbed up/down 12 steps with HR and one UE support with CGA.  Pt ambulated safety taking her time without LOB.  Pt has 12 steps to get into and out of her house.   Pt returned to her room seated on EOB.    Patient left sitting edge of bed with all lines intact, call button in reach, and RN notified..    GOALS:   Multidisciplinary Problems       Physical Therapy Goals       Not on file              Multidisciplinary Problems (Resolved)          Problem: Physical Therapy    Goal Priority Disciplines Outcome Goal Variances Interventions   Physical Therapy Goal   (Resolved)     PT, PT/OT Met     Description: Goals to be met by: 2024     Patient will increase functional independence with mobility by performin. Supine to sit with Modified [place]  2. Sit to supine with Modified [place]  3. Rolling with Modified [place].  4. Sit to stand transfer with Modified [place] with Rolling Walker as needed  5. Bed to chair transfer with Modified [place] using Rolling Walker as needed  6. Gait  x 200 feet with Modified [place] using Rolling Walker as needed   7. Ascend/descend 1 flight of stairs with left Handrails Modified [place]   8. Lower extremity exercise  program x10 reps with independence                         Time Tracking:     PT Received On: 08/13/24  PT Start Time: 1111     PT Stop Time: 1129  PT Total Time (min): 18 min     Billable Minutes: Gait Training 18    Treatment Type: Treatment  PT/PTA: PTA     Number of PTA visits since last PT visit: 1 08/13/2024

## 2024-08-13 NOTE — SUBJECTIVE & OBJECTIVE
Interval History: NAEON. Unable to get home health due to insurance. Tolerating diet without issue. Ostomy continues to be productive with thickened output    Medications:  Continuous Infusions:  Scheduled Meds:   acetaminophen  650 mg Oral Q8H    amLODIPine  10 mg Oral Daily    loperamide  2 mg Oral TID     PRN Meds:  Current Facility-Administered Medications:     dextrose 10%, 12.5 g, Intravenous, PRN    dextrose 10%, 25 g, Intravenous, PRN    hydrALAZINE, 10 mg, Intravenous, Q6H PRN    labetalol, 10 mg, Intravenous, Q6H PRN    melatonin, 6 mg, Oral, Nightly PRN    morphine, 2 mg, Intravenous, Q4H PRN    ondansetron, 4 mg, Intravenous, Q6H PRN    oxyCODONE, 5 mg, Oral, Q4H PRN    sodium chloride 0.9%, 10 mL, Intravenous, PRN     Review of patient's allergies indicates:  No Known Allergies  Objective:     Vital Signs (Most Recent):  Temp: 98 °F (36.7 °C) (08/13/24 0800)  Pulse: 101 (08/13/24 0800)  Resp: 18 (08/13/24 0800)  BP: (!) 174/99 (08/13/24 0800)  SpO2: 99 % (08/13/24 0800) Vital Signs (24h Range):  Temp:  [97.5 °F (36.4 °C)-98.1 °F (36.7 °C)] 98 °F (36.7 °C)  Pulse:  [] 101  Resp:  [16-20] 18  SpO2:  [98 %-100 %] 99 %  BP: (136-174)/(83-99) 174/99     Weight: 50 kg (110 lb 3.7 oz)  Body mass index is 20.83 kg/m².    Intake/Output - Last 3 Shifts         08/11 0700  08/12 0659 08/12 0700 08/13 0659 08/13 0700 08/14 0659    P.O.  60     Total Intake(mL/kg)  60 (1.2)     Urine (mL/kg/hr) 550 (0.5)      Stool 650      Total Output 1200      Net -1200 +60            Urine Occurrence 3 x      Stool Occurrence  0 x              Physical Exam  Vitals and nursing note reviewed.   Constitutional:       General: She is not in acute distress.     Appearance: She is not toxic-appearing.   HENT:      Head: Normocephalic and atraumatic.   Eyes:      General: No scleral icterus.     Extraocular Movements: Extraocular movements intact.   Cardiovascular:      Rate and Rhythm: Normal rate and regular rhythm.    Pulmonary:      Effort: Pulmonary effort is normal. No respiratory distress.   Abdominal:      General: There is no distension.      Palpations: Abdomen is soft.      Tenderness: There is abdominal tenderness (appropriately). There is no guarding or rebound.      Comments: Midline incision dressing removed, cdi  Ileostomy pink, patent, with thickened output   Skin:     General: Skin is warm and dry.   Neurological:      General: No focal deficit present.      Mental Status: She is alert and oriented to person, place, and time.          Significant Labs:  I have reviewed all pertinent lab results within the past 24 hours.    Significant Diagnostics:  I have reviewed all pertinent imaging results/findings within the past 24 hours.

## 2024-08-14 LAB
FINAL PATHOLOGIC DIAGNOSIS: ABNORMAL
GROSS: ABNORMAL
Lab: ABNORMAL
MICROSCOPIC EXAM: ABNORMAL

## 2024-08-15 DIAGNOSIS — C77.8 MALIGNANT NEOPLASM METASTATIC TO LYMPH NODES OF MULTIPLE SITES: Primary | ICD-10-CM

## 2024-08-15 DIAGNOSIS — C22.9 MALIGNANT NEOPLASM OF LIVER, UNSPECIFIED LIVER MALIGNANCY TYPE: ICD-10-CM

## 2024-08-19 ENCOUNTER — DOCUMENTATION ONLY (OUTPATIENT)
Dept: REHABILITATION | Facility: HOSPITAL | Age: 54
End: 2024-08-19

## 2024-08-19 NOTE — PROGRESS NOTES
Patient no showed physical therapy initial evaluation on 8/19/24 at 7am and was rescheduled to 4pm and no showed a second time.     Edie Thakur, PT, DPT, OCS  08/19/2024

## 2024-08-21 ENCOUNTER — OFFICE VISIT (OUTPATIENT)
Dept: SURGERY | Facility: CLINIC | Age: 54
End: 2024-08-21
Payer: MEDICAID

## 2024-08-21 VITALS
WEIGHT: 104.06 LBS | SYSTOLIC BLOOD PRESSURE: 120 MMHG | BODY MASS INDEX: 19.65 KG/M2 | DIASTOLIC BLOOD PRESSURE: 80 MMHG | HEART RATE: 101 BPM | HEIGHT: 61 IN

## 2024-08-21 DIAGNOSIS — C18.3 MALIGNANT NEOPLASM OF HEPATIC FLEXURE: Primary | ICD-10-CM

## 2024-08-21 PROCEDURE — 1160F RVW MEDS BY RX/DR IN RCRD: CPT | Mod: CPTII,,, | Performed by: STUDENT IN AN ORGANIZED HEALTH CARE EDUCATION/TRAINING PROGRAM

## 2024-08-21 PROCEDURE — 99213 OFFICE O/P EST LOW 20 MIN: CPT | Mod: PBBFAC,PN | Performed by: STUDENT IN AN ORGANIZED HEALTH CARE EDUCATION/TRAINING PROGRAM

## 2024-08-21 PROCEDURE — 3079F DIAST BP 80-89 MM HG: CPT | Mod: CPTII,,, | Performed by: STUDENT IN AN ORGANIZED HEALTH CARE EDUCATION/TRAINING PROGRAM

## 2024-08-21 PROCEDURE — 99024 POSTOP FOLLOW-UP VISIT: CPT | Mod: ,,, | Performed by: STUDENT IN AN ORGANIZED HEALTH CARE EDUCATION/TRAINING PROGRAM

## 2024-08-21 PROCEDURE — 1159F MED LIST DOCD IN RCRD: CPT | Mod: CPTII,,, | Performed by: STUDENT IN AN ORGANIZED HEALTH CARE EDUCATION/TRAINING PROGRAM

## 2024-08-21 PROCEDURE — 3074F SYST BP LT 130 MM HG: CPT | Mod: CPTII,,, | Performed by: STUDENT IN AN ORGANIZED HEALTH CARE EDUCATION/TRAINING PROGRAM

## 2024-08-21 PROCEDURE — 99999 PR PBB SHADOW E&M-EST. PATIENT-LVL III: CPT | Mod: PBBFAC,,, | Performed by: STUDENT IN AN ORGANIZED HEALTH CARE EDUCATION/TRAINING PROGRAM

## 2024-08-21 PROCEDURE — 3044F HG A1C LEVEL LT 7.0%: CPT | Mod: CPTII,,, | Performed by: STUDENT IN AN ORGANIZED HEALTH CARE EDUCATION/TRAINING PROGRAM

## 2024-08-21 NOTE — PROGRESS NOTES
S/p open right hemicolectomy for obstructing intussuscepting mass with large liver lesion  Path reviewed, colon and liver mass malignant but further testing pending  Overall feeling well  Ileostomy output good, empties 2-3x per day  Eating well  No NV  Minimal pain  Staples removed  Incision good  Ambulating well, good functional status, gets up and down stairs at her home without issue  Refer to oncology  RTC in about a month for check

## 2024-08-26 ENCOUNTER — TELEPHONE (OUTPATIENT)
Dept: HEMATOLOGY/ONCOLOGY | Facility: CLINIC | Age: 54
End: 2024-08-26
Payer: MEDICAID

## 2024-08-27 ENCOUNTER — TELEPHONE (OUTPATIENT)
Dept: HEMATOLOGY/ONCOLOGY | Facility: CLINIC | Age: 54
End: 2024-08-27
Payer: MEDICAID

## 2024-08-27 NOTE — TELEPHONE ENCOUNTER
----- Message from Meghan Espinosa sent at 8/27/2024  8:32 AM CDT -----  Type:  Call    Who Called:Daughter  Does the patient know what this is regarding?:later time  Would the patient rather a call back or a response via [facility]? call  Best Call Back Number:[phone]  Additional Information: Daughter stated she would like a later time for her appt.

## 2024-09-18 ENCOUNTER — TELEPHONE (OUTPATIENT)
Dept: SURGERY | Facility: CLINIC | Age: 54
End: 2024-09-18
Payer: MEDICAID

## 2024-09-18 ENCOUNTER — OFFICE VISIT (OUTPATIENT)
Dept: SURGERY | Facility: CLINIC | Age: 54
End: 2024-09-18
Payer: MEDICAID

## 2024-09-18 VITALS
DIASTOLIC BLOOD PRESSURE: 102 MMHG | WEIGHT: 100.75 LBS | BODY MASS INDEX: 19.02 KG/M2 | HEIGHT: 61 IN | HEART RATE: 120 BPM | SYSTOLIC BLOOD PRESSURE: 163 MMHG

## 2024-09-18 DIAGNOSIS — C18.3 MALIGNANT NEOPLASM OF HEPATIC FLEXURE: Primary | ICD-10-CM

## 2024-09-18 PROCEDURE — 3044F HG A1C LEVEL LT 7.0%: CPT | Mod: CPTII,,, | Performed by: STUDENT IN AN ORGANIZED HEALTH CARE EDUCATION/TRAINING PROGRAM

## 2024-09-18 PROCEDURE — 99024 POSTOP FOLLOW-UP VISIT: CPT | Mod: ,,, | Performed by: STUDENT IN AN ORGANIZED HEALTH CARE EDUCATION/TRAINING PROGRAM

## 2024-09-18 PROCEDURE — 1159F MED LIST DOCD IN RCRD: CPT | Mod: CPTII,,, | Performed by: STUDENT IN AN ORGANIZED HEALTH CARE EDUCATION/TRAINING PROGRAM

## 2024-09-18 PROCEDURE — 1160F RVW MEDS BY RX/DR IN RCRD: CPT | Mod: CPTII,,, | Performed by: STUDENT IN AN ORGANIZED HEALTH CARE EDUCATION/TRAINING PROGRAM

## 2024-09-18 PROCEDURE — 99213 OFFICE O/P EST LOW 20 MIN: CPT | Mod: PBBFAC,PN | Performed by: STUDENT IN AN ORGANIZED HEALTH CARE EDUCATION/TRAINING PROGRAM

## 2024-09-18 PROCEDURE — 3080F DIAST BP >= 90 MM HG: CPT | Mod: CPTII,,, | Performed by: STUDENT IN AN ORGANIZED HEALTH CARE EDUCATION/TRAINING PROGRAM

## 2024-09-18 PROCEDURE — 99999 PR PBB SHADOW E&M-EST. PATIENT-LVL III: CPT | Mod: PBBFAC,,, | Performed by: STUDENT IN AN ORGANIZED HEALTH CARE EDUCATION/TRAINING PROGRAM

## 2024-09-18 PROCEDURE — 3077F SYST BP >= 140 MM HG: CPT | Mod: CPTII,,, | Performed by: STUDENT IN AN ORGANIZED HEALTH CARE EDUCATION/TRAINING PROGRAM

## 2024-09-18 RX ORDER — OXYCODONE AND ACETAMINOPHEN 5; 325 MG/1; MG/1
1 TABLET ORAL EVERY 6 HOURS PRN
Qty: 20 TABLET | Refills: 0 | Status: SHIPPED | OUTPATIENT
Start: 2024-09-18

## 2024-09-18 NOTE — TELEPHONE ENCOUNTER
----- Message from Pauly Mckeon sent at 9/18/2024  2:32 PM CDT -----  Type:  Pharmacy Calling to Clarify an RX    Name of Caller: Rashida     Pharmacy Name: [facility]  Prescription Name:  oxyCODONE-acetaminophen (PERCOCET) 5-325 mg per tablet [[id number]     What do they need to clarify?: Diagnosis cod    Best Call Back Number: [phone]  Additional Information:

## 2024-09-20 NOTE — H&P (VIEW-ONLY)
S/p exlap, right hemicolectomy for obstructing colon mass  Has some LUQ pain, corresponding to large liver lesion  Ostomy working well. Not losing weight  Eating ok  Incision healing well  Sees dr lucero next week  Denies NV  RTC as needed

## 2024-09-24 ENCOUNTER — LAB VISIT (OUTPATIENT)
Dept: LAB | Facility: HOSPITAL | Age: 54
End: 2024-09-24
Attending: INTERNAL MEDICINE
Payer: MEDICAID

## 2024-09-24 ENCOUNTER — OFFICE VISIT (OUTPATIENT)
Dept: HEMATOLOGY/ONCOLOGY | Facility: CLINIC | Age: 54
End: 2024-09-24
Payer: MEDICAID

## 2024-09-24 ENCOUNTER — PATIENT MESSAGE (OUTPATIENT)
Dept: SURGERY | Facility: CLINIC | Age: 54
End: 2024-09-24
Payer: MEDICAID

## 2024-09-24 VITALS
BODY MASS INDEX: 18.94 KG/M2 | HEART RATE: 101 BPM | SYSTOLIC BLOOD PRESSURE: 176 MMHG | RESPIRATION RATE: 18 BRPM | OXYGEN SATURATION: 100 % | DIASTOLIC BLOOD PRESSURE: 99 MMHG | HEIGHT: 61 IN | WEIGHT: 100.31 LBS

## 2024-09-24 DIAGNOSIS — I10 PRIMARY HYPERTENSION: ICD-10-CM

## 2024-09-24 DIAGNOSIS — R11.2 CINV (CHEMOTHERAPY-INDUCED NAUSEA AND VOMITING): ICD-10-CM

## 2024-09-24 DIAGNOSIS — R63.0 LOSS OF APPETITE: ICD-10-CM

## 2024-09-24 DIAGNOSIS — T45.1X5A CINV (CHEMOTHERAPY-INDUCED NAUSEA AND VOMITING): ICD-10-CM

## 2024-09-24 DIAGNOSIS — C77.8 MALIGNANT NEOPLASM METASTATIC TO LYMPH NODES OF MULTIPLE SITES: ICD-10-CM

## 2024-09-24 DIAGNOSIS — C18.3 MALIGNANT NEOPLASM OF HEPATIC FLEXURE: Primary | ICD-10-CM

## 2024-09-24 DIAGNOSIS — G89.3 CANCER RELATED PAIN: ICD-10-CM

## 2024-09-24 PROCEDURE — 3008F BODY MASS INDEX DOCD: CPT | Mod: CPTII,,, | Performed by: INTERNAL MEDICINE

## 2024-09-24 PROCEDURE — 99999 PR PBB SHADOW E&M-EST. PATIENT-LVL IV: CPT | Mod: PBBFAC,,, | Performed by: INTERNAL MEDICINE

## 2024-09-24 PROCEDURE — 3080F DIAST BP >= 90 MM HG: CPT | Mod: CPTII,,, | Performed by: INTERNAL MEDICINE

## 2024-09-24 PROCEDURE — 36415 COLL VENOUS BLD VENIPUNCTURE: CPT | Performed by: INTERNAL MEDICINE

## 2024-09-24 PROCEDURE — 3077F SYST BP >= 140 MM HG: CPT | Mod: CPTII,,, | Performed by: INTERNAL MEDICINE

## 2024-09-24 PROCEDURE — 99205 OFFICE O/P NEW HI 60 MIN: CPT | Mod: S$PBB,,, | Performed by: INTERNAL MEDICINE

## 2024-09-24 PROCEDURE — 1159F MED LIST DOCD IN RCRD: CPT | Mod: CPTII,,, | Performed by: INTERNAL MEDICINE

## 2024-09-24 PROCEDURE — 3044F HG A1C LEVEL LT 7.0%: CPT | Mod: CPTII,,, | Performed by: INTERNAL MEDICINE

## 2024-09-24 PROCEDURE — 99214 OFFICE O/P EST MOD 30 MIN: CPT | Mod: PBBFAC,PO | Performed by: INTERNAL MEDICINE

## 2024-09-24 RX ORDER — NAPROXEN 500 MG/1
500 TABLET ORAL DAILY
Qty: 30 TABLET | Refills: 0 | Status: SHIPPED | OUTPATIENT
Start: 2024-09-24

## 2024-09-24 RX ORDER — ONDANSETRON 8 MG/1
8 TABLET, ORALLY DISINTEGRATING ORAL EVERY 8 HOURS PRN
Qty: 60 TABLET | Refills: 3 | Status: SHIPPED | OUTPATIENT
Start: 2024-09-24

## 2024-09-24 RX ORDER — CYPROHEPTADINE HYDROCHLORIDE 4 MG/1
4 TABLET ORAL 3 TIMES DAILY PRN
Qty: 90 TABLET | Refills: 3 | Status: SHIPPED | OUTPATIENT
Start: 2024-09-24

## 2024-09-24 RX ORDER — PROMETHAZINE HYDROCHLORIDE 25 MG/1
25 TABLET ORAL EVERY 6 HOURS PRN
Qty: 60 TABLET | Refills: 0 | Status: SHIPPED | OUTPATIENT
Start: 2024-09-24

## 2024-09-24 RX ORDER — OXYCODONE AND ACETAMINOPHEN 10; 325 MG/1; MG/1
1 TABLET ORAL EVERY 8 HOURS PRN
Qty: 90 TABLET | Refills: 0 | Status: SHIPPED | OUTPATIENT
Start: 2024-09-24

## 2024-09-24 RX ORDER — NAPROXEN 500 MG/1
500 TABLET ORAL DAILY
Qty: 30 TABLET | Refills: 0 | Status: SHIPPED | OUTPATIENT
Start: 2024-09-24 | End: 2024-09-24

## 2024-09-24 RX ORDER — AMLODIPINE BESYLATE 10 MG/1
10 TABLET ORAL DAILY
Qty: 90 TABLET | Refills: 3 | Status: SHIPPED | OUTPATIENT
Start: 2024-09-24 | End: 2025-09-24

## 2024-09-24 RX ORDER — IBUPROFEN 800 MG/1
800 TABLET ORAL NIGHTLY
Qty: 20 TABLET | Refills: 0 | Status: SHIPPED | OUTPATIENT
Start: 2024-09-24

## 2024-09-24 RX ORDER — OLANZAPINE 5 MG/1
TABLET ORAL
Qty: 6 TABLET | Refills: 11 | Status: SHIPPED | OUTPATIENT
Start: 2024-09-24

## 2024-09-24 NOTE — PROGRESS NOTES
PATIENT: Rosette Wood  MRN: [id number]  DATE: 9/24/2024    Diagnosis:   1. Malignant neoplasm of hepatic flexure    2. 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 CINV (chemotherapy-induced nausea and vomiting)    4. Loss of appetite    5. Cancer related pain    6. Primary hypertension        Chief Complaint: Colon Cancer (/) and Establish Care      Oncologic History:   54yo woman went to ED August 5 with c/o fatigue and abdominal pain dating back to at least June. CT scans showed intussusception of the colon at the hepatic flexure. She was taken to the OR for ex-lap and underwent right hemicolectomy and liver biopsy both compatible with diagnosis of metastatic adenocarcinoma carcinoma.             Subjective:      History of Present Illness: Ms. Wood is a 53 y.o. female who presents for evaluation and management of Stg IV colon cancer.  She presents to establish care with heme/onc. She notes ongoing issues with pain, stating that percocet 5 hasn't been helping with the pain and so she will take two sometimes to help with the pain. Pain mostly related to abdominal surgery. Appetite is poor and she has had a lot of weight loss and she requests something to help with appetite. We reviewed her imaging and pathology and discussed diagnosis. Explained that she has Stg IV colon cancer and this cannot be cured but treatments can help her live longer than she would without treatments. We discussed rationale, risks, expected toxicity and possible side-effects as well as symptomatic management/supportive care of FOLFOX+richard and she does wish to proceed. Consent signed in clinic today for FOLFOX+richard.         Past medical, surgical, family, and social histories have been reviewed and updated below.    Past Medical History: No past medical history on file.    Past Surgical History:   Past Surgical History:   Procedure Laterality Date    COLECTOMY, RIGHT Right 8/6/2024    Procedure: COLECTOMY, RIGHT;   Surgeon: Pedro Saxena MD;  Location: [facility] OR;  Service: General;  Laterality: Right;    ILEOSTOMY  8/6/2024    Procedure: CREATION, ILEOSTOMY;  Surgeon: Pedro Saxena MD;  Location: [facility] OR;  Service: General;;    LAPAROTOMY, EXPLORATORY N/A 8/6/2024    Procedure: LAPAROTOMY, EXPLORATORY;  Surgeon: Pedro Saxena MD;  Location: [facility] OR;  Service: General;  Laterality: N/A;    LIVER BIOPSY Left 8/6/2024    Procedure: BIOPSY, LIVER;  Surgeon: Pedro Saxena MD;  Location: [facility] OR;  Service: General;  Laterality: Left;  left liver mass biopsy       Family History: No family history on file.    Social History:  reports that she has never smoked. She does not have any smokeless tobacco history on file.    Allergies:  Review of patient's allergies indicates:  No Known Allergies    Medications:  Current Outpatient Medications   Medication Sig Dispense Refill    acetaminophen (TYLENOL) 500 MG tablet Take 1 tablet (500 mg total) by mouth every 6 (six) hours as needed for Pain. 20 tablet 0    ferrous sulfate 325 (65 FE) MG EC tablet Take 1 tablet (325 mg total) by mouth 3 (three) times daily with meals. 30 tablet 0    metroNIDAZOLE (FLAGYL) 500 MG tablet Take 1 tablet (500 mg total) by mouth 2 (two) times a day. 60 tablet 0    orphenadrine (NORFLEX) 100 mg tablet Take 1 tablet (100 mg total) by mouth nightly as needed (muscle cramps). 10 tablet 0    amLODIPine (NORVASC) 10 MG tablet Take 1 tablet (10 mg total) by mouth once daily. 90 tablet 3    cyproheptadine (PERIACTIN) 4 mg tablet Take 1 tablet (4 mg total) by mouth 3 (three) times daily as needed (appetite stimulant). 90 tablet 3    ibuprofen (ADVIL,MOTRIN) 800 MG tablet Take 1 tablet (800 mg total) by mouth every evening. 20 tablet 0    naproxen (NAPROSYN) 500 MG tablet Take 1 tablet (500 mg total) by mouth once daily. 30 tablet 0    OLANZapine (ZYPREXA) 5 MG tablet Take 1 tablet by mouth nightly on days 1-3 of each chemotherapy cycle. 6 tablet  "11    ondansetron (ZOFRAN-ODT) 8 MG TbDL Take 1 tablet (8 mg total) by mouth every 8 (eight) hours as needed (Nausea). 60 tablet 3    oxyCODONE-acetaminophen (PERCOCET)  mg per tablet Take 1 tablet by mouth every 8 (eight) hours as needed for Pain. 90 tablet 0    promethazine (PHENERGAN) 25 MG tablet Take 1 tablet (25 mg total) by mouth every 6 (six) hours as needed for Nausea. 60 tablet 0     No current facility-administered medications for this visit.       Review of Systems  A comprehensive review of systems was performed; pertinent positives and negatives are noted in the HPI.    ECOG Performance Status:   ECOG SCORE    1 - Restricted in strenuous activity-ambulatory and able to carry out work of a light nature         Objective:      Vitals:   Vitals:    09/24/24 1419   BP: (!) 176/99   BP Location: Left arm   Patient Position: Sitting   BP Method: Small (Automatic)   Pulse: 101   Resp: 18   SpO2: 100%   Weight: 45.5 kg (100 lb 5 oz)   Height: 5' 1" (1.549 m)     BMI: Body mass index is 18.95 kg/m².    Physical Exam  Vitals and nursing note reviewed.   Constitutional:       General: She is not in acute distress.     Appearance: Normal appearance. She is not toxic-appearing.   HENT:      Head: Normocephalic and atraumatic.   Eyes:      General: No scleral icterus.     Conjunctiva/sclera: Conjunctivae normal.   Pulmonary:      Effort: Pulmonary effort is normal. No respiratory distress.   Abdominal:      General: There is no distension.   Musculoskeletal:         General: No swelling or deformity.      Cervical back: Neck supple.   Skin:     Coloration: Skin is not jaundiced.      Findings: No erythema.   Neurological:      General: No focal deficit present.      Mental Status: She is alert and oriented to person, place, and time.      Motor: No weakness.   Psychiatric:         Mood and Affect: Mood normal.         Behavior: Behavior normal.         Laboratory Data:  Labs have been reviewed.  Tumor NGS " Tissue  Order: [id number]  Resulted 8/23/2024 11:26 AM     Status: Final result     Dx: Malignant neoplasm metastatic to lymp...     Specimen Information: Tissue     Somatic     Contains abnormal data APC (Pathogenic)  p.S4635ef - c.4243del Frameshift - LOF  VAF: 26.7%        Interpretation  Sequence Change & Region        Sequencing Information           Contains abnormal data APC (Pathogenic)  p.R499* - c.1495C>T Stop gain - LOF  VAF: 25.6%        Interpretation  Sequence Change & Region        Sequencing Information           Contains abnormal data  (Pathogenic)  c.[id number]+19del Splice region variant - LOF  VAF: 36.2%        Interpretation  Sequence Change & Region        Sequencing Information           ARID1B (Uncertain significance)  p.O2188U - c.6355G>A Missense variant  VAF: 46.9%        Sequence Change & Region        Sequencing Information           BCLAF1 (Uncertain significance)  p.S153P - c.457T>C Missense variant  VAF: 20.8%        Sequence Change & Region        Sequencing Information           CTNNB1 (Uncertain significance)  p.E101D - c.303G>C Missense variant  VAF: 26%        Sequence Change & Region        Sequencing Information           ERCC2 (Uncertain significance)  p.A660V - c.1979C>T Missense variant  VAF: 20.5%        Sequence Change & Region        Sequencing Information           FANCB (Uncertain significance)  p.S164* - c.491C>A Stop gain  VAF: 18.1%        Sequence Change & Region        Sequencing Information           HNF1B (Uncertain significance)  p.A314T - c.940G>A Missense variant  VAF: 26.1%        Sequence Change & Region        Sequencing Information           KMT2D (Uncertain significance)  p.U5082Z - c.7078C>G Missense variant  VAF: 49.8%        Sequence Change & Region        Sequencing Information           L2HGDH (Uncertain significance)   Splice region variant  VAF: 11.9%        Sequence Change & Region        Sequencing Information           MTHFR (Uncertain  significance)  p.R266Q - c.797G>A Missense variant  VAF: 45%        Sequence Change & Region        Sequencing Information           PHOX2B (Uncertain significance)  p.R158H - c.473G>A Missense variant  VAF: 27.9%        Sequence Change & Region        Sequencing Information           RECQL4 (Uncertain significance)  p.H362Y - c.1084C>T Missense variant  VAF: 25.6%        Sequence Change & Region        Sequencing Information          Tumor NGS Tissue  Order: [id number]  Status: Final result     Visible to patient: Yes (seen)     Next appt: 09/27/2024 at 03:00 PM in Outpatient Rehab (Lisbet Kong, PT)     Dx: Malignant neoplasm metastatic to lymp...     Specimen Information: Tissue   0 Result Notes  Immunotherapy Biomarkers    MSI: Stable          TMB: 5.8 m/MB          Low Coverage Regions: KDM5D          Fusion Addendum Issue Type: NEGATIVE   Negative - This addendum is being issued to report that no gene fusions or altered splicing variants from RNA sequencing analysis were found. This report is being issued to report the results of gene rearrangement and altered splicing analysis from RNA sequencing. No gene rearrangements nor reportable altered splicing events were identified from RNA sequencing.            PD-L1 Results    PD-L1 Interpretation by 22C3: negative          PD-L1 (22C3) Combined Positive Score: <1          PD-L1 (22C3) Tumor Proportion Score: <1 %              Specimen to Pathology, Surgery General Surgery  Order: [id number]  Status: Edited Result - FINAL     Visible to patient: Yes (seen)     Next appt: 09/27/2024 at 03:00 PM in Outpatient Rehab (Lisbet Kong, PT)     0 Result Notes      Component 1 mo ago   Final Pathologic Diagnosis      Abnormal   1. Liver mass, left (needle biopsy):   Positive for tumor.     2. Colon and terminal ileum (right hemicolectomy):   Positive for tumor.   Further characterization pending [place] consultation. See comment.   Tumor invades through muscularis propria  into pericolonic tissue   Overlying colonic mucosa with low and high grade dysplasia   Margins negative for tumor (tumor present within a lymph node at the radial margin).   Metastatic tumor in thirteen of thirty-six lymph nodes (13/36).   Uninvolved small and large intestine mucosa with no significant histopathologic changes.   Appendix with no significant histopathologic changes     Immunohistochemistry (IHC) Testing for Mismatch Repair (MMR) Proteins (block 2 J):   MLH1, MSH2, MSH6, PMS2 - Intact nuclear expression   Background nonneoplastic tissue/internal control with intact nuclear expression     Interpretation: No loss of nuclear expression of MMR proteins: low probability of microsatellite instability. There are exceptions to the above IHC interpr etations. These results should not be considered in isolation, and clinical correlation with   genetic counseling is recommended to assess the need for germline testing.     [place] NGS and PDL1 have been ordered (block 1).   Her2 IHC pending on 2J.     Comment: Findings are communicated to Dr. MINNA Saxena via EPIC message 8/13/24.         Comment: Interp By Lakia Kinney M.D., Signed on 08/21/2024 at 18:20   Supplemental Diagnosis      Abnormal   [place] FINAL DIAGNOSIS   Interpretation   Colon, right,(KES-[id number], part 2; 08/06/2024): Poorly differentiated/undifferentiated carcinoma with INI-1 loss. See comment.     COMMENT   I agree with your primary assessment. Sections show a poorly differentiated/undifferentiated tumor involving the colon. Submitted immunostains are reviewed. The tumor cells show patchy positivity for cytokeratin AE1/3 and CDX2. They are negative for CK7,    CK20, PAX8, CD10, synaptophysin, chromogranin, calretinin, p40, and p63. There is no loss of DNA mismatch repair proteins (MLH1, MSH2, MSH6, PMS2).     Additional immunostains were performed at [facility] (block 2I): CHENTE Keratin, INSM1, Ki-67 (qualitative), SATB2, INI-1, BRG1,  mucicarmine, vimentin. The tumor cells show strong diffuse positivity for keratin CHENTE, SATB2 and vimentin. They are   negative for INSM1 and mucicarmine. The tumor cells loose expression of INI-1 while retain expression of BRG1. Ki-67 immunostain shows high proliferative index (appr oximately 60%).     The overall findings support a diagnosis of poorly differentiated/undifferentiated carcinoma of the colon with INI-1 loss.   Thank you for sharing this case with me. If you have any questions, please do not hesitate to reach me by calling [facility] at [phone].     Report electronically signed by   Jovi Chandra M.D., Ph.D.     Report attached.     Performing location:   [place] [address]     &quot;Disclaimer:  This case diagnosis was rendered completely by the outside consultation pathologist and the case is electronically signed by an [facility] pathologist listed below solely to release the report into the medical record.&quot;   VC      Microscopic Exam      Abnormal   Liver metastatic tumor block: 1A   Presumed primary tumor blocks: 2 G through J   Lymph node metastatic tumor blocks to are     Block 2 J   CK7:  Negative   CK20:  Negative   CDX2: Patchy immunoreactivity   PAX8:  Negative   CD10:  Negative   Synaptophysin: Negative   Chromogranin:  Negative   AE1/3: Patchy immunoreactivity   Calretinin: Negative   p40: Negative   p63: Negative   Immunostains are performed with appropriate controls.   VC      Gross      Abnormal   Part 1   MRN # on requisition [id number]   MRN # on AP label [id number]     Received fresh, labeled left liver mass biopsy, are multiple rubbery dry and tan needle biopsy fragments measuring from 0.7 to 1.9 cm.  Entirely submitted.  ZUQ--1-A     Part 2   MRN # on requisition [id number]   MRN # on AP label [id number]     Received fresh, labeled right colon, is a 6.5 cm segment of small intestine  in continuity with 29 cm of large intestine and the attached 9 x 0.6 cm unremarkable appendix.     The mucosa of the small intestine is folded edematous and pink.     The mucosa of the large intestine is slightly folded and pink displaying a well-defined lobulated raise pink necrotic 7 x 5 cm mass.  On cut surfaces, the mass appears to involve all layers but serosa.  The mass is at 18.5, 10 and 5.5 cm from the   proximal, distal, and circumferential margins.   The lumen next to the tumor is slightly narrow.     The serosa is smooth and pink.  Serosa next to the mass is inked with black.  There is a moderat e amount of attached adipose tissue.  The adipose tissue searched for lymph nodes.  Multiple lymph nodes are identified.  Some of the lymph nodes appears to   have metastasis.  Many of the lymph node with metastasis are next to the circumferential margin.   No hemorrhagic areas are identified throughout the length of the specimen.     Representative sections are submitted, as follows:   WEN--2-A shave proximal margin   OPZ--2-B shave distal margin   GST--2-C random sections of small intestine   IYD--2-D circumferential margin   MMR--2-E YCX--2-F serosa next to the tumor, serosa is inked with black   VCM--2-G HZG--0-OWAR--2-I FYX--2-J tumor   KBT--2-K random sections of large intestine   OCY--2-L appendix and multiple lymph node submitted whole   GSU--7-ESCU--2-N BZE--2-O JUA--2-P XHU--2-Q multiple lymph node entirely submitted whole   BYK--2-R HRC--2-S NPR--2 -T ZCX--2-U LOZ--2-V One lymph node is entirely submitted per cassette.  Each cassette contains a complete lymph node.             Grossed by [facility]   VC      Disclaimer      Abnormal   Unless the case is a 'gross only' or additional testing only, the final diagnosis for each specimen  is based on a microscopic examination of appropriate tissue sections.   HER-2/birgit IHC (4B5) clone, DAB detection method) is done on 10% buffered formalin-fixed (for 6-72 hrs), paraffin embedded tissue sections. The scoring is completed on a 4-tiered scoring system of membrane staining using the 2014 ASCO/CAP scoring   guidelines. It has been cleared by the U.S. FDA for use as an IVD test. This assay has not been validated on decalcified tissues. Results should be interpreted with caution given the likelihood of false negativity on decalcified specimens.    HER-2/birgit IHC (4B5) clone, DAB detection method) is done on 10% buffered formalin-fixed (for 6-72 hrs), paraffin embedded tissue sections. The scoring is completed on a 4-tiered scoring system of membrane staining using the 2014 ASCO/CAP scoring   guidelines. It has been cleared by the U.S. FDA for use as an IVD test. This assay has not  been validated on decalcified tissues. Results should be interpreted with caution given the likelihood of false negativity on decalcified specimens.    VC      Resulting Agency [facility]              Narrative  Performed by: Ogone  Pre-op Diagnosis: Intussusception [K56.1]   Procedure(s):   LAPAROTOMY, EXPLORATORY   Number of specimens: 2   Name of specimens: #1 left liver mass biopsy; #2 right colon   Release to patient->Immediate   Specimen total (fresh, frozen, permanent):->2      Specimen Collected: 08/06/24 16:33 CDT Last Resulted: 08/21/24 18:48 CDT                CT Abdomen Pelvis With IV Contrast NO Oral Contrast  Order: [id number]  Status: Edited Result - FINAL     Visible to patient: Yes (seen)     Next appt: 09/27/2024 at 03:00 PM in Outpatient Rehab (Lisbet Kong, PT)     0 Result Notes  Details    Reading Physician Reading Date Result Priority   Gianna Patrick MD  [phone] 8/5/2024 STAT     Addenda     Upon further review of the CT there are additional liver lesions.  There is one measuring 15 x 15 cm in  segment 8 in the dome of the liver ( axial image 19 series 3) demonstrating similar heterogeneous enhancement concerning for metastatic lesion.  Another separate 1.3 cm lesion in the left lobe of the liver segment 2 axial (image 25 series 3).  Another small 4 mm lesion segment 4A (axial image 18 series 3) also is concerning.  Additional small subcentimeter lesions are questioned throughout the left lobe.        Electronically signed by: Gianna Patrick  Date:                                            08/06/2024  Time:                                           10:54  Signed by Gianna Patrick MD on 8/6/2024 10:56  Narrative & Impression  EXAMINATION:  CT ABDOMEN PELVIS WITH IV CONTRAST     CLINICAL HISTORY:  Abdominal abscess/infection suspected;     TECHNIQUE:  Low dose axial images, sagittal and coronal reformations were obtained from the lung bases to the pubic symphysis following the IV administration of 75 mL of Omnipaque 350 .  Oral contrast was not administered.     COMPARISON:  07/24/2024 and 12/11/2023 CT abdomen pelvis     FINDINGS:  Abdomen:     - Lower thorax:Imaged heart is not significantly enlarged.     - Lung bases: There is a enlarged right lower lobe nodule now measuring 7.2 mm previously 4.2 mm on 07/24/2024.     - Liver: There is a 4.9 x 3.2 x 7 cm left lobe of the liver mass (axial image 33 series 3, coronal image 18 series 601) suspected to be increased in size as compared to the prior exam 7/24 (4.9 x 2.6 x 5.8 cm) noting this mass is better defined on this exam with contrast.     - Gallbladder: There is no evidence of cholelithiasis or cholecystitis by CT.     - Bile Ducts: No evidence of intra or extra hepatic biliary ductal dilation.     - Spleen: Negative.     - Kidneys: Renal cortices enhance symmetrically and homogeneously.  There is no evidence of renal calculi or hydronephrosis.     - Adrenals: There is a heterogeneously enhancing 2 0.1 x 2.1 cm right adrenal mass possibly  metastatic lesion.  No associated calcification.  Left adrenal is unremarkable.     - Pancreas: No mass or peripancreatic fat stranding.     - Retroperitoneum:  No convincing adenopathy noting lack of intra-abdominal fat, ascites and hazy appearance of the mesentery limits evaluation of mesentery for adenopathy.     - Vascular: No abdominal aortic aneurysm.  Mild scattered atherosclerotic calcifications noted     - Abdominal wall:  Unremarkable.     Bowel/Mesentery:     There is a large ascending colon into colon intussusception with thickened walls of the intussusception and enhancing nodularity compatible with malignancy.  The intussusception loop and vascular pedicle demonstrated enhancement.  The hepatic flexure appears involved.  There is note of several small bowel loops filled with fluid in the pelvis and several air-fluid levels.  No convincing significant obstruction is seen at this time noting ascites is now present and distal colon is unremarkable as imaged.  Appendix is normal.     Pelvis:     There is fluid in the pelvis.  Uterus and adnexa are unremarkable as imaged.  The bladder is relatively collapsed and unremarkable as imaged.     Bones:  No acute osseous abnormality and no suspicious lytic or blastic lesion.  There are degenerative changes involving the imaged spine without subluxation or compression fracture.  No convincing lytic or sclerotic osseous lesions are seen involving the imaged osseous structures.     Impression:     There is a large colon-colon intussusception in the right upper quadrant involving the distal ascending and transverse colon.  There is nodular thickening of the central intussusception concerning for a colonic malignancy.  There is no evidence of extraluminal free air however note is made of the presence of acute ascites on this exam moderate in degree.  Prior CT was through the abdomen only with limited visualization of the bowel noting there is no evidence of significant  bowel obstruction presently several small bowel loops of borderline normal caliber and are fluid-filled.  No convincing evidence of ischemic bowel.  Surgical consultation and metastatic workup with PET CT is recommended.     Additionally there is an enlarged left lobe of the liver mass compared to the prior exam 07/24/2024 concerning for metastatic disease now measuring 4.9 x 3.2 x 7 cm previously 4.9 x 2.6 x 5.8 cm on 07/24/2024 noting lack of IV contrast limited evaluation.     A 2 x 2 cm right adrenal mass with heterogeneous enhancement is also noted which may represent metastatic lesion.     Enlarged right lower lobe pulmonary nodule now measuring 7.2 mm previously 4 mm on 07/24/2024 concerning for metastatic lesion     This report was flagged in Epic as abnormal.        Electronically signed by: Gianna Patrick  Date:                                            08/05/2024  Time:                                           19:55           Exam Ended: 08/05/24 18:55 CDT Last Resulted: 08/06/24 10:54 CDT                Assessment/Plan:       1. Malignant neoplasm of hepatic flexure    2. 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 CINV (chemotherapy-induced nausea and vomiting)    4. Loss of appetite    5. Cancer related pain    6. Primary hypertension      Stg IV NSCLC, MSI-S, KRAS WT  Needs pre-treatment staging and CEA  Will need port  Consented today for FOLFOX + richard (though anticipate not starting richard right away due to need for abdominal surgery in near future).  Palliative care referral.    Orders Placed This Encounter    NM PET CT FDG Skull Base to Mid Thigh    PHARMACOGENOMICS PANEL    Urinalysis    Magnesium    CEA    Comprehensive Metabolic Panel    CBC Auto Differential    Urinalysis    Ambulatory referral/consult to CLINIC Palliative Care    Physician communication order    Physician communication order    Physician communication order    oxyCODONE-acetaminophen (PERCOCET)   mg per tablet    cyproheptadine (PERIACTIN) 4 mg tablet    ondansetron (ZOFRAN-ODT) 8 MG TbDL    ibuprofen (ADVIL,MOTRIN) 800 MG tablet    OLANZapine (ZYPREXA) 5 MG tablet    amLODIPine (NORVASC) 10 MG tablet    naproxen (NAPROSYN) 500 MG tablet    promethazine (PHENERGAN) 25 MG tablet          Gabino Walters MD, FACP  Hematology/Oncology  [facility] - [address], [address]  [place] [address]  Phone: [phone]  Fax: [phone]      High Risk Conditions:    Patient has a condition that poses threat to life and bodily function: colon cancer  Patient is currently on drug therapy requiring intensive monitoring for toxicity: FOLFOX+richard

## 2024-09-25 ENCOUNTER — TELEPHONE (OUTPATIENT)
Dept: SURGERY | Facility: CLINIC | Age: 54
End: 2024-09-25
Payer: MEDICAID

## 2024-09-25 DIAGNOSIS — C18.3 MALIGNANT NEOPLASM OF HEPATIC FLEXURE: Primary | ICD-10-CM

## 2024-09-25 DIAGNOSIS — K63.89 COLONIC MASS: ICD-10-CM

## 2024-09-25 NOTE — TELEPHONE ENCOUNTER
----- Message from Odalis Dolan sent at 9/25/2024  2:50 PM CDT -----  Type:  RX Refill Request    Who Called: Pt   Refill or New Rx: New Rx   RX Name and Strength: ostomy package (not listed)   Preferred Pharmacy with phone number:  [facility]  [address] [place] [address]  Phone: [phone] Fax: [phone]  Local or Mail Order: Local   Ordering Provider: Hemanth   Would the patient rather a call back or a response via [facility]? Call back   Best Call Back Number: [phone]  Additional Information: Please be advised, pt states that if dr can send ostomy package to pharmacy listed above and would also like a call once it has been put in

## 2024-09-26 ENCOUNTER — TELEPHONE (OUTPATIENT)
Dept: HEMATOLOGY/ONCOLOGY | Facility: CLINIC | Age: 54
End: 2024-09-26
Payer: MEDICAID

## 2024-09-26 ENCOUNTER — ANESTHESIA EVENT (OUTPATIENT)
Dept: SURGERY | Facility: HOSPITAL | Age: 54
End: 2024-09-26
Payer: MEDICAID

## 2024-09-26 ENCOUNTER — TELEPHONE (OUTPATIENT)
Dept: SURGERY | Facility: CLINIC | Age: 54
End: 2024-09-26
Payer: MEDICAID

## 2024-09-26 ENCOUNTER — PATIENT MESSAGE (OUTPATIENT)
Dept: SURGERY | Facility: CLINIC | Age: 54
End: 2024-09-26
Payer: MEDICAID

## 2024-09-26 NOTE — ANESTHESIA PREPROCEDURE EVALUATION
[facility]  Anesthesia Pre-Operative Evaluation   09/26/2024        Rosette Wood, 1970  [id number]  Procedure(s) (LRB):  INSERTION, VENOUS ACCESS PORT (N/A)    Subjective    Rosette Wood is a 53 y.o. 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 S/p hemicolectomy for obstructing intussuscepting mass.    Patient now presents for above procedure(s).     Prev Airway:   Date Department Mask Airway Size Difficult Intubation Attempts   08/06/24 [facility]) easy mask 7.0 No 1       LDA:        Ileostomy 08/06/24 RLQ (Active)   Number of days: 51       Drips: None documented.      Patient Active Problem List   Diagnosis    Malignant neoplasm metastatic to lymph nodes of multiple sites       Review of patient's allergies indicates:  No Known Allergies    Current Inpatient Medications:       No current facility-administered medications on file prior to encounter.     Current Outpatient Medications on File Prior to Encounter   Medication Sig Dispense Refill    acetaminophen (TYLENOL) 500 MG tablet Take 1 tablet (500 mg total) by mouth every 6 (six) hours as needed for Pain. 20 tablet 0    amLODIPine (NORVASC) 10 MG tablet Take 1 tablet (10 mg total) by mouth once daily. 90 tablet 3    cyproheptadine (PERIACTIN) 4 mg tablet Take 1 tablet (4 mg total) by mouth 3 (three) times daily as needed (appetite stimulant). 90 tablet 3    ferrous sulfate 325 (65 FE) MG EC tablet Take 1 tablet (325 mg total) by mouth 3 (three) times daily with meals. 30 tablet 0    ibuprofen (ADVIL,MOTRIN) 800 MG tablet Take 1 tablet (800 mg total) by mouth every evening. 20 tablet 0    metroNIDAZOLE (FLAGYL) 500 MG tablet Take 1 tablet (500 mg total) by mouth 2 (two) times a day. 60 tablet 0    naproxen (NAPROSYN) 500 MG tablet Take 1 tablet (500 mg total) by mouth once daily. 30 tablet 0    OLANZapine (ZYPREXA) 5 MG tablet Take 1 tablet by mouth nightly on days  1-3 of each chemotherapy cycle. 6 tablet 11    ondansetron (ZOFRAN-ODT) 8 MG TbDL Take 1 tablet (8 mg total) by mouth every 8 (eight) hours as needed (Nausea). 60 tablet 3    orphenadrine (NORFLEX) 100 mg tablet Take 1 tablet (100 mg total) by mouth nightly as needed (muscle cramps). 10 tablet 0    oxyCODONE-acetaminophen (PERCOCET)  mg per tablet Take 1 tablet by mouth every 8 (eight) hours as needed for Pain. 90 tablet 0    promethazine (PHENERGAN) 25 MG tablet Take 1 tablet (25 mg total) by mouth every 6 (six) hours as needed for Nausea. 60 tablet 0       Past Surgical History:   Procedure Laterality Date    COLECTOMY, RIGHT Right 8/6/2024    Procedure: COLECTOMY, RIGHT;  Surgeon: Pedro Saxena MD;  Location: [facility] OR;  Service: General;  Laterality: Right;    ILEOSTOMY  8/6/2024    Procedure: CREATION, ILEOSTOMY;  Surgeon: Pedro Saxena MD;  Location: [facility] OR;  Service: General;;    LAPAROTOMY, EXPLORATORY N/A 8/6/2024    Procedure: LAPAROTOMY, EXPLORATORY;  Surgeon: Pedro Saxena MD;  Location: [facility] OR;  Service: General;  Laterality: N/A;    LIVER BIOPSY Left 8/6/2024    Procedure: BIOPSY, LIVER;  Surgeon: Pedro Saxena MD;  Location: [facility] OR;  Service: General;  Laterality: Left;  left liver mass biopsy       Social History:  Tobacco Use: Unknown (9/24/2024)    Patient History     Smoking Tobacco Use: Never     Smokeless Tobacco Use: Unknown     Passive Exposure: Not on file       Alcohol Use: Not At Risk (9/24/2024)    AUDIT-C     Frequency of Alcohol Consumption: Never     Average Number of Drinks: Patient does not drink     Frequency of Binge Drinking: Never       Objective    Vital Signs Range:  BMI Readings from Last 1 Encounters:   09/24/24 18.95 kg/m²       BP: ()/()   Arterial Line BP: ()/()        Significant Labs:        Component Value Date/Time    WBC 13.76 (H) 09/24/2024 1604    HGB 10.2 (L) 09/24/2024 1604    HCT 35.0 (L) 09/24/2024 1604     (H) 09/24/2024  1604     09/24/2024 1604    K 4.6 09/24/2024 1604     09/24/2024 1604    CO2 24 09/24/2024 1604     09/24/2024 1604    BUN 13 09/24/2024 1604    CREATININE 0.7 09/24/2024 1604    MG 2.0 09/24/2024 1604    PHOS 3.9 08/12/2024 0508    CALCIUM 10.2 09/24/2024 1604    ALBUMIN 3.4 (L) 09/24/2024 1604    PROT 8.0 09/24/2024 1604    ALKPHOS 251 (H) 09/24/2024 1604    BILITOT 0.2 09/24/2024 1604    AST 15 09/24/2024 1604    ALT 10 09/24/2024 1604    HGBA1C 5.5 07/19/2024 1203        Please see Results Review for additional labs.     Diagnostic Studies: All relevant studies, reviewed.      EKG:   Results for orders placed or performed in visit on 05/23/23   EKG 12-lead    Collection Time: 05/11/23 11:33 PM    Narrative    Test Reason : R07.9,    Vent. Rate : 075 BPM     Atrial Rate : 075 BPM     P-R Int : 170 ms          QRS Dur : 078 ms      QT Int : 440 ms       P-R-T Axes : 077 054 062 degrees     QTc Int : 491 ms    Normal sinus rhythm with sinus arrhythmia  Nonspecific T wave abnormality  Abnormal ECG  Confirmed by Sweta BUTLER, Riki AC ([id number]) on 5/23/2023 6:06:39 PM    Referred By: System System           Confirmed By:Riki Sol MD       ECHO:  No results found for this or any previous visit.         Pre-op Assessment    I have reviewed the Patient Summary Reports.     I have reviewed the Nursing Notes. I have reviewed the NPO Status.   I have reviewed the Medications.     Review of Systems  Anesthesia Hx:  No problems with previous Anesthesia               Denies Personal Hx of Anesthesia complications.                    Social:  Non-Smoker       Hematology/Oncology:                      Current/Recent Cancer.  Other (see Oncology comments)              Cardiovascular:     Hypertension   Denies MI.  Denies CAD.     Denies Dysrhythmias.    Denies CHF.    no hyperlipidemia   ECG has been reviewed.  Functional Capacity good / => 4 METS                         Pulmonary:    Denies COPD.  Denies  Asthma.   Denies Shortness of breath.   Denies Sleep Apnea.                Hepatic/GI:      Denies GERD.             Musculoskeletal:  Musculoskeletal Normal                Neurological:  Denies TIA.  Denies CVA.    Denies Seizures.                                Endocrine:  Endocrine Normal                Physical Exam  General: Well nourished, Cooperative, Alert and Oriented    Airway:  Mallampati: II   Mouth Opening: Normal  TM Distance: Normal  Tongue: Normal  Neck ROM: Normal ROM    Dental:  Edentulous  Mostly edentulous, some teeth broken at root, sharp      Anesthesia Plan  Type of Anesthesia, risks & benefits discussed:    Anesthesia Type: Gen Natural Airway  Intra-op Monitoring Plan: Standard ASA Monitors  Post Op Pain Control Plan: multimodal analgesia  Induction:  IV  Airway Plan: Video, Post-Induction  Informed Consent: Informed consent signed with the Patient and all parties understand the risks and agree with anesthesia plan.  All questions answered.   ASA Score: 3  Day of Surgery Review of History & Physical: H&P Update referred to the surgeon/provider.    Ready For Surgery From Anesthesia Perspective.     .

## 2024-09-26 NOTE — TELEPHONE ENCOUNTER
----- Message from Isabel Hensley sent at 9/26/2024  2:38 PM CDT -----  Patient has a referral for Malignant neoplasm of hepatic flexure. Please call patient to schedule.

## 2024-09-26 NOTE — TELEPHONE ENCOUNTER
----- Message from Lori Hartley sent at 9/26/2024  9:20 AM CDT -----  Type:  Patient Returning Call    Who Called:Pt   Would the patient rather a call back or a response via [facility]? Call back   Best Call Back Number:[phone]  Additional Information: pt is calling about ostomy supplies.. said pharmacy did not receive script              09/26/2024                         1605  Spoke to patient regarding the above message. Patient stated that the pharmacy never received the script. Let her know I would send this message to Dr. Saxena and if he is not able to respond, he will address this matter at her appointment tomorrow morning. Patient verbalized understanding.

## 2024-09-27 ENCOUNTER — HOSPITAL ENCOUNTER (OUTPATIENT)
Facility: HOSPITAL | Age: 54
Discharge: HOME | End: 2024-09-27
Attending: STUDENT IN AN ORGANIZED HEALTH CARE EDUCATION/TRAINING PROGRAM | Admitting: STUDENT IN AN ORGANIZED HEALTH CARE EDUCATION/TRAINING PROGRAM
Payer: MEDICAID

## 2024-09-27 ENCOUNTER — TELEPHONE (OUTPATIENT)
Dept: HEMATOLOGY/ONCOLOGY | Facility: CLINIC | Age: 54
End: 2024-09-27
Payer: MEDICAID

## 2024-09-27 ENCOUNTER — PATIENT MESSAGE (OUTPATIENT)
Dept: SURGERY | Facility: CLINIC | Age: 54
End: 2024-09-27

## 2024-09-27 ENCOUNTER — ANESTHESIA (OUTPATIENT)
Dept: SURGERY | Facility: HOSPITAL | Age: 54
End: 2024-09-27
Payer: MEDICAID

## 2024-09-27 DIAGNOSIS — C18.3 MALIGNANT NEOPLASM OF HEPATIC FLEXURE: ICD-10-CM

## 2024-09-27 DIAGNOSIS — K63.89 COLONIC MASS: ICD-10-CM

## 2024-09-27 PROCEDURE — 63600175 PHARM REV CODE 636 W HCPCS: Mod: JZ,JG | Performed by: STUDENT IN AN ORGANIZED HEALTH CARE EDUCATION/TRAINING PROGRAM

## 2024-09-27 PROCEDURE — 36561 INSERT TUNNELED CV CATH: CPT | Mod: 79,RT,, | Performed by: STUDENT IN AN ORGANIZED HEALTH CARE EDUCATION/TRAINING PROGRAM

## 2024-09-27 PROCEDURE — 36000706: Performed by: STUDENT IN AN ORGANIZED HEALTH CARE EDUCATION/TRAINING PROGRAM

## 2024-09-27 PROCEDURE — 25000003 PHARM REV CODE 250: Performed by: STUDENT IN AN ORGANIZED HEALTH CARE EDUCATION/TRAINING PROGRAM

## 2024-09-27 PROCEDURE — 76937 US GUIDE VASCULAR ACCESS: CPT | Mod: 26,,, | Performed by: STUDENT IN AN ORGANIZED HEALTH CARE EDUCATION/TRAINING PROGRAM

## 2024-09-27 PROCEDURE — 37000008 HC ANESTHESIA 1ST 15 MINUTES: Performed by: STUDENT IN AN ORGANIZED HEALTH CARE EDUCATION/TRAINING PROGRAM

## 2024-09-27 PROCEDURE — 71000015 HC POSTOP RECOV 1ST HR: Performed by: STUDENT IN AN ORGANIZED HEALTH CARE EDUCATION/TRAINING PROGRAM

## 2024-09-27 PROCEDURE — 63600175 PHARM REV CODE 636 W HCPCS

## 2024-09-27 PROCEDURE — C1788 PORT, INDWELLING, IMP: HCPCS | Performed by: STUDENT IN AN ORGANIZED HEALTH CARE EDUCATION/TRAINING PROGRAM

## 2024-09-27 PROCEDURE — 36000707: Performed by: STUDENT IN AN ORGANIZED HEALTH CARE EDUCATION/TRAINING PROGRAM

## 2024-09-27 PROCEDURE — 71000016 HC POSTOP RECOV ADDL HR: Performed by: STUDENT IN AN ORGANIZED HEALTH CARE EDUCATION/TRAINING PROGRAM

## 2024-09-27 PROCEDURE — 77001 FLUOROGUIDE FOR VEIN DEVICE: CPT | Mod: 26,,, | Performed by: STUDENT IN AN ORGANIZED HEALTH CARE EDUCATION/TRAINING PROGRAM

## 2024-09-27 PROCEDURE — 37000009 HC ANESTHESIA EA ADD 15 MINS: Performed by: STUDENT IN AN ORGANIZED HEALTH CARE EDUCATION/TRAINING PROGRAM

## 2024-09-27 DEVICE — POWERPORT CLEARVUE IMPLANTABLE PORT WITH ATTACHABLE 8F POLYURETHANE OPEN-ENDED SINGLE-LUMEN VENOUS CATHETER INTERMEDIATE KIT
Type: IMPLANTABLE DEVICE | Site: CHEST | Status: FUNCTIONAL
Brand: POWERPORT CLEARVUE

## 2024-09-27 RX ORDER — LIDOCAINE HYDROCHLORIDE 10 MG/ML
INJECTION, SOLUTION INFILTRATION; PERINEURAL
Status: DISCONTINUED | OUTPATIENT
Start: 2024-09-27 | End: 2024-09-27 | Stop reason: HOSPADM

## 2024-09-27 RX ORDER — PROPOFOL 10 MG/ML
VIAL (ML) INTRAVENOUS
Status: DISCONTINUED | OUTPATIENT
Start: 2024-09-27 | End: 2024-09-27

## 2024-09-27 RX ORDER — CEFAZOLIN SODIUM 1 G/3ML
INJECTION, POWDER, FOR SOLUTION INTRAMUSCULAR; INTRAVENOUS
Status: DISCONTINUED | OUTPATIENT
Start: 2024-09-27 | End: 2024-09-27

## 2024-09-27 RX ORDER — HEPARIN SODIUM 10000 [USP'U]/ML
INJECTION, SOLUTION INTRAVENOUS; SUBCUTANEOUS
Status: DISCONTINUED | OUTPATIENT
Start: 2024-09-27 | End: 2024-09-27 | Stop reason: HOSPADM

## 2024-09-27 RX ORDER — SODIUM CHLORIDE 0.9 % (FLUSH) 0.9 %
10 SYRINGE (ML) INJECTION
Status: DISCONTINUED | OUTPATIENT
Start: 2024-09-27 | End: 2024-09-27 | Stop reason: HOSPADM

## 2024-09-27 RX ORDER — OXYCODONE HYDROCHLORIDE 5 MG/1
5 TABLET ORAL
Status: DISCONTINUED | OUTPATIENT
Start: 2024-09-27 | End: 2024-09-27 | Stop reason: HOSPADM

## 2024-09-27 RX ORDER — DEXAMETHASONE SODIUM PHOSPHATE 4 MG/ML
INJECTION, SOLUTION INTRA-ARTICULAR; INTRALESIONAL; INTRAMUSCULAR; INTRAVENOUS; SOFT TISSUE
Status: DISCONTINUED | OUTPATIENT
Start: 2024-09-27 | End: 2024-09-27

## 2024-09-27 RX ORDER — HYDROMORPHONE HYDROCHLORIDE 2 MG/ML
0.2 INJECTION, SOLUTION INTRAMUSCULAR; INTRAVENOUS; SUBCUTANEOUS EVERY 5 MIN PRN
Status: DISCONTINUED | OUTPATIENT
Start: 2024-09-27 | End: 2024-09-27 | Stop reason: HOSPADM

## 2024-09-27 RX ORDER — LABETALOL HYDROCHLORIDE 5 MG/ML
INJECTION, SOLUTION INTRAVENOUS
Status: DISCONTINUED | OUTPATIENT
Start: 2024-09-27 | End: 2024-09-27

## 2024-09-27 RX ORDER — PROCHLORPERAZINE EDISYLATE 5 MG/ML
5 INJECTION INTRAMUSCULAR; INTRAVENOUS EVERY 30 MIN PRN
Status: DISCONTINUED | OUTPATIENT
Start: 2024-09-27 | End: 2024-09-27 | Stop reason: HOSPADM

## 2024-09-27 RX ORDER — BUPIVACAINE HYDROCHLORIDE 5 MG/ML
INJECTION, SOLUTION EPIDURAL; INTRACAUDAL
Status: DISCONTINUED | OUTPATIENT
Start: 2024-09-27 | End: 2024-09-27 | Stop reason: HOSPADM

## 2024-09-27 RX ORDER — MIDAZOLAM HYDROCHLORIDE 1 MG/ML
INJECTION INTRAMUSCULAR; INTRAVENOUS
Status: DISCONTINUED | OUTPATIENT
Start: 2024-09-27 | End: 2024-09-27

## 2024-09-27 RX ORDER — FENTANYL CITRATE 50 UG/ML
INJECTION, SOLUTION INTRAMUSCULAR; INTRAVENOUS
Status: DISCONTINUED | OUTPATIENT
Start: 2024-09-27 | End: 2024-09-27

## 2024-09-27 RX ORDER — LABETALOL HYDROCHLORIDE 5 MG/ML
5 INJECTION, SOLUTION INTRAVENOUS ONCE
Status: DISCONTINUED | OUTPATIENT
Start: 2024-09-27 | End: 2024-09-27 | Stop reason: HOSPADM

## 2024-09-27 RX ORDER — AMLODIPINE BESYLATE 5 MG/1
10 TABLET ORAL ONCE
Status: COMPLETED | OUTPATIENT
Start: 2024-09-27 | End: 2024-09-27

## 2024-09-27 RX ORDER — GLUCAGON 1 MG
1 KIT INJECTION
Status: DISCONTINUED | OUTPATIENT
Start: 2024-09-27 | End: 2024-09-27 | Stop reason: HOSPADM

## 2024-09-27 RX ADMIN — PROPOFOL 120 MG: 10 INJECTION, EMULSION INTRAVENOUS at 01:09

## 2024-09-27 RX ADMIN — FENTANYL CITRATE 25 MCG: 50 INJECTION INTRAMUSCULAR; INTRAVENOUS at 01:09

## 2024-09-27 RX ADMIN — LABETALOL HYDROCHLORIDE 5 MG: 5 INJECTION INTRAVENOUS at 01:09

## 2024-09-27 RX ADMIN — MIDAZOLAM HYDROCHLORIDE 2 MG: 1 INJECTION, SOLUTION INTRAMUSCULAR; INTRAVENOUS at 12:09

## 2024-09-27 RX ADMIN — PROPOFOL 30 MG: 10 INJECTION, EMULSION INTRAVENOUS at 01:09

## 2024-09-27 RX ADMIN — CEFAZOLIN 2 G: 330 INJECTION, POWDER, FOR SOLUTION INTRAMUSCULAR; INTRAVENOUS at 01:09

## 2024-09-27 RX ADMIN — LABETALOL HYDROCHLORIDE 10 MG: 5 INJECTION INTRAVENOUS at 01:09

## 2024-09-27 RX ADMIN — SODIUM CHLORIDE, SODIUM LACTATE, POTASSIUM CHLORIDE, AND CALCIUM CHLORIDE: .6; .31; .03; .02 INJECTION, SOLUTION INTRAVENOUS at 12:09

## 2024-09-27 RX ADMIN — PROPOFOL 85 MG: 10 INJECTION, EMULSION INTRAVENOUS at 01:09

## 2024-09-27 RX ADMIN — PROPOFOL 20 MG: 10 INJECTION, EMULSION INTRAVENOUS at 01:09

## 2024-09-27 RX ADMIN — PROPOFOL 75 MG: 10 INJECTION, EMULSION INTRAVENOUS at 01:09

## 2024-09-27 RX ADMIN — DEXAMETHASONE SODIUM PHOSPHATE 4 MG: 4 INJECTION, SOLUTION INTRA-ARTICULAR; INTRALESIONAL; INTRAMUSCULAR; INTRAVENOUS; SOFT TISSUE at 01:09

## 2024-09-27 RX ADMIN — AMLODIPINE BESYLATE 10 MG: 5 TABLET ORAL at 02:09

## 2024-09-27 RX ADMIN — PROPOFOL 100 MG: 10 INJECTION, EMULSION INTRAVENOUS at 01:09

## 2024-09-27 NOTE — TRANSFER OF CARE
"Anesthesia Transfer of Care Note    Patient: Rosette Wood    Procedure(s) Performed: Procedure(s) (LRB):  INSERTION, VENOUS ACCESS PORT (Right)    Patient location: PACU    Anesthesia Type: general    Transport from OR: Transported from OR on 6-10 L/min O2 by face mask with adequate spontaneous ventilation    Post pain: adequate analgesia    Post assessment: no apparent anesthetic complications and tolerated procedure well    Post vital signs: stable    Level of consciousness: awake, alert and oriented    Nausea/Vomiting: no nausea/vomiting    Complications: none    Transfer of care protocol was followed      Last vitals: Visit Vitals  BP (!) 180/91   Pulse 80   Temp 36.3 °C (97.3 °F) (Skin)   Resp 20   Ht 5' 1" (1.549 m)   Wt 45.4 kg (100 lb)   SpO2 100%   Breastfeeding No   BMI 18.89 kg/m²     "

## 2024-09-27 NOTE — OP NOTE
[place] - Surgery ([facility])  General Surgery  Operative Note    SUMMARY     Date of Procedure: 9/27/2024     Procedure: Procedure(s) (LRB):  INSERTION, VENOUS ACCESS PORT (Right)       Surgeons and Role:     * Pedro Saxena MD - Primary    Assisting Surgeon: Ella Phillips PGY2    Pre-Operative Diagnosis: Malignant neoplasm of hepatic flexure [C18.3]    Post-Operative Diagnosis: Post-Op Diagnosis Codes:     * Malignant neoplasm of hepatic flexure [C18.3]    Anesthesia: Local MAC    Operative Findings (including complications, if any):     Description of Technical Procedures:   The patient was identified in the preoperative unit and taken back to the operating room and laid supine on the operating room table. IV antibiotics were administered prior to the start of anesthesia. MAC anesthesia was administered without complication. The patient was then prepped and draped in the standard sterile fashion. The ultrasound was used to identify the Right internal jugular vein. The patient was placed into the Trendelenburg position and an 18g needle was used to access the vein with one pass under ultrasound guidance. The ultrasound images were stored and interpreted by me. The wire was advanced without issue under fluoroscopic guidance. The bed was returned to leveled position and we turned our attention to creation of the port pocket in the right upper chest. 5ml of 1% local anesthesia was injected into the right upper chest in the location of the port pocket. A 4cm incision was made using a 15 blade scalpel, and the tissues were dissected using a bovie cautery until the pectoral fascia was identified. A pocket was made in the subcutaneous tissue to accommodate the port. Once we were satisfied with the pocket, the catheter was flushed and then measured using fluoro and then cut to an appropriate length. Using a tunneling device it was passed from the pocket to the neck incision and then connected and secured to the  port. The port was then sutured into position using 3.0 vicryl suture. The patient was returned to the Trendelenburg position. Under fluoroscopic guidance the dilator and sheath were placed over the wire using Seldinger technique. The dilator and wire were removed and the catheter was introduced and fed into the sheath as the peel away sheath was removed. Fluoro was used to confirm the position of the catheter in the distal SVC and that the catheter did not appear to be kinked at the neck location. The dermis of the port pocket was re-approximated with 4.0 vicryl suture in an interrupted fashion. A garcia needle was used to draw back blood and was then flushed and locked with heparin. The skin was re-approximated using 5.0 monocryl suture in a running fashion. Steri strips and dry sterile dressing were then applied. The patient was awakened from anesthesia without complication and returned to the postoperative recovery unit in stable condition. At the end of the case, sponge, instrument, and needle counts were correct and hemostasis was achieved. I was present and scrubbed throughout the entirety of the case.       Significant Surgical Tasks Conducted by the Assistant(s), if Applicable:     Estimated Blood Loss (EBL): 10ml           Implants:   Implant Name Type Inv. Item Serial No.  Lot No. LRB No. Used Action   PORT POWER CLEAR VIEW - [id number]  PORT POWER CLEAR VIEW  [employer] [id number] Right 1 Implanted       Specimens:   Specimen (24h ago, onward)      None                    Condition: Stable    Disposition: PACU - hemodynamically stable.    Attestation: I was present and scrubbed for the entire procedure.

## 2024-09-28 NOTE — ANESTHESIA POSTPROCEDURE EVALUATION
Anesthesia Post Evaluation    Patient: Rosette Wood    Procedure(s) Performed: Procedure(s) (LRB):  INSERTION, VENOUS ACCESS PORT (Right)    Final Anesthesia Type: general      Patient location during evaluation: PACU  Patient participation: Yes- Able to Participate  Level of consciousness: awake and alert and oriented  Post-procedure vital signs: reviewed and stable  Pain management: adequate  Airway patency: patent  AARON mitigation strategies: Multimodal analgesia  PONV status at discharge: No PONV  Anesthetic complications: no      Cardiovascular status: hemodynamically stable  Respiratory status: spontaneous ventilation and unassisted  Hydration status: euvolemic  Follow-up not needed.              Vitals Value Taken Time   /93 09/27/24 1620   Temp 36.9 °C (98.4 °F) 09/27/24 1620   Pulse 95 09/27/24 1620   Resp 17 09/27/24 1620   SpO2 98 % 09/27/24 1620         No case tracking events are documented in the log.      Pain/Zhang Score: Zhang Score: 10 (9/27/2024  4:20 PM)

## 2024-09-30 VITALS
RESPIRATION RATE: 17 BRPM | WEIGHT: 100 LBS | TEMPERATURE: 98 F | BODY MASS INDEX: 18.88 KG/M2 | HEIGHT: 61 IN | DIASTOLIC BLOOD PRESSURE: 93 MMHG | OXYGEN SATURATION: 98 % | SYSTOLIC BLOOD PRESSURE: 166 MMHG | HEART RATE: 95 BPM

## 2024-10-02 NOTE — DISCHARGE SUMMARY
[facility] ([facility])  Short Stay  Discharge Summary    Admit Date: 9/27/2024    Discharge Date and Time: 9/27/2024  4:29 PM      Discharge Attending Physician: No att. providers found     Hospital Course (synopsis of major diagnoses, care, treatment, and services provided during the course of the hospital stay): Patient brought in for elective surgery. Underwent procedure without complication and was discharged.       Final Diagnoses:    Principal Problem: Malignant neoplasm metastatic to lymph nodes of multiple sites   Secondary Diagnoses:   Active Hospital Problems    Diagnosis  POA    *Malignant neoplasm metastatic to lymph nodes of multiple sites [C77.8]  Yes      Resolved Hospital Problems   No resolved problems to display.       Discharged Condition: stable    Disposition: Home or Self Care    Follow up/Patient Instructions:    Medications:  Reconciled Home Medications:      Medication List        CONTINUE taking these medications      acetaminophen 500 MG tablet  Commonly known as: TYLENOL  Take 1 tablet (500 mg total) by mouth every 6 (six) hours as needed for Pain.     amLODIPine 10 MG tablet  Commonly known as: NORVASC  Take 1 tablet (10 mg total) by mouth once daily.     cyproheptadine 4 mg tablet  Commonly known as: PERIACTIN  Take 1 tablet (4 mg total) by mouth 3 (three) times daily as needed (appetite stimulant).     ferrous sulfate 325 (65 FE) MG EC tablet  Take 1 tablet (325 mg total) by mouth 3 (three) times daily with meals.     ibuprofen 800 MG tablet  Commonly known as: ADVIL,MOTRIN  Take 1 tablet (800 mg total) by mouth every evening.     naproxen 500 MG tablet  Commonly known as: NAPROSYN  Take 1 tablet (500 mg total) by mouth once daily.     OLANZapine 5 MG tablet  Commonly known as: ZyPREXA  Take 1 tablet by mouth nightly on days 1-3 of each chemotherapy cycle.     ondansetron 8 MG Tbdl  Commonly known as: ZOFRAN-ODT  Take 1 tablet (8 mg total) by mouth every 8 (eight) hours as needed  "(Nausea).     orphenadrine 100 mg tablet  Commonly known as: NORFLEX  Take 1 tablet (100 mg total) by mouth nightly as needed (muscle cramps).     oxyCODONE-acetaminophen  mg per tablet  Commonly known as: PERCOCET  Take 1 tablet by mouth every 8 (eight) hours as needed for Pain.     promethazine 25 MG tablet  Commonly known as: PHENERGAN  Take 1 tablet (25 mg total) by mouth every 6 (six) hours as needed for Nausea.            Discharge Procedure Orders   OSTOMY SUPPLIES FOR HOME USE     Order Specific Question Answer Comments   Height: 5' 1" (1.549 m)    Weight: 45.4 kg (100 lb)    Length of need (1-99 months): 99    Diagnosis Colostomy       Follow-up Information       Pedro Saxena MD Follow up.    Specialties: Surgery, General Surgery  Why: As needed  Contact information:  [address]  [address]  [place] [address] [phone]                                 "

## 2024-10-04 LAB
ONEOME COMMENT: NORMAL
ONEOME METHOD: NORMAL

## 2024-10-15 ENCOUNTER — OFFICE VISIT (OUTPATIENT)
Dept: PALLIATIVE MEDICINE | Facility: CLINIC | Age: 54
End: 2024-10-15
Payer: MEDICAID

## 2024-10-15 VITALS
SYSTOLIC BLOOD PRESSURE: 168 MMHG | OXYGEN SATURATION: 98 % | BODY MASS INDEX: 18.5 KG/M2 | WEIGHT: 97.88 LBS | DIASTOLIC BLOOD PRESSURE: 115 MMHG | HEART RATE: 116 BPM

## 2024-10-15 DIAGNOSIS — G47.01 INSOMNIA DUE TO MEDICAL CONDITION: ICD-10-CM

## 2024-10-15 DIAGNOSIS — G89.3 CANCER RELATED PAIN: ICD-10-CM

## 2024-10-15 DIAGNOSIS — R64 CANCER CACHEXIA: Primary | ICD-10-CM

## 2024-10-15 DIAGNOSIS — R11.2 INTRACTABLE NAUSEA AND VOMITING: ICD-10-CM

## 2024-10-15 DIAGNOSIS — C18.3 MALIGNANT NEOPLASM OF HEPATIC FLEXURE: ICD-10-CM

## 2024-10-15 DIAGNOSIS — Z71.89 ADVANCED CARE PLANNING/COUNSELING DISCUSSION: ICD-10-CM

## 2024-10-15 PROCEDURE — 99205 OFFICE O/P NEW HI 60 MIN: CPT | Mod: S$PBB,,, | Performed by: STUDENT IN AN ORGANIZED HEALTH CARE EDUCATION/TRAINING PROGRAM

## 2024-10-15 PROCEDURE — 3044F HG A1C LEVEL LT 7.0%: CPT | Mod: CPTII,,, | Performed by: STUDENT IN AN ORGANIZED HEALTH CARE EDUCATION/TRAINING PROGRAM

## 2024-10-15 PROCEDURE — 1160F RVW MEDS BY RX/DR IN RCRD: CPT | Mod: CPTII,,, | Performed by: STUDENT IN AN ORGANIZED HEALTH CARE EDUCATION/TRAINING PROGRAM

## 2024-10-15 PROCEDURE — 3080F DIAST BP >= 90 MM HG: CPT | Mod: CPTII,,, | Performed by: STUDENT IN AN ORGANIZED HEALTH CARE EDUCATION/TRAINING PROGRAM

## 2024-10-15 PROCEDURE — 99213 OFFICE O/P EST LOW 20 MIN: CPT | Mod: PBBFAC,PO | Performed by: STUDENT IN AN ORGANIZED HEALTH CARE EDUCATION/TRAINING PROGRAM

## 2024-10-15 PROCEDURE — 99999 PR PBB SHADOW E&M-EST. PATIENT-LVL III: CPT | Mod: PBBFAC,,, | Performed by: STUDENT IN AN ORGANIZED HEALTH CARE EDUCATION/TRAINING PROGRAM

## 2024-10-15 PROCEDURE — 3077F SYST BP >= 140 MM HG: CPT | Mod: CPTII,,, | Performed by: STUDENT IN AN ORGANIZED HEALTH CARE EDUCATION/TRAINING PROGRAM

## 2024-10-15 PROCEDURE — 1159F MED LIST DOCD IN RCRD: CPT | Mod: CPTII,,, | Performed by: STUDENT IN AN ORGANIZED HEALTH CARE EDUCATION/TRAINING PROGRAM

## 2024-10-15 PROCEDURE — 3008F BODY MASS INDEX DOCD: CPT | Mod: CPTII,,, | Performed by: STUDENT IN AN ORGANIZED HEALTH CARE EDUCATION/TRAINING PROGRAM

## 2024-10-15 RX ORDER — OLANZAPINE 5 MG/1
5 TABLET, ORALLY DISINTEGRATING ORAL
Qty: 21 TABLET | Refills: 0 | Status: SHIPPED | OUTPATIENT
Start: 2024-10-15 | End: 2024-10-22

## 2024-10-15 RX ORDER — OXYCODONE AND ACETAMINOPHEN 10; 325 MG/1; MG/1
1 TABLET ORAL EVERY 6 HOURS PRN
Qty: 120 TABLET | Refills: 0 | Status: SHIPPED | OUTPATIENT
Start: 2024-10-15 | End: 2024-11-14

## 2024-10-15 RX ORDER — DRONABINOL 5 MG/1
5 CAPSULE ORAL
Qty: 60 CAPSULE | Refills: 5 | Status: SHIPPED | OUTPATIENT
Start: 2024-10-15 | End: 2025-04-13

## 2024-10-15 RX ORDER — GABAPENTIN 600 MG/1
600 TABLET ORAL 3 TIMES DAILY
Qty: 90 TABLET | Refills: 11 | Status: SHIPPED | OUTPATIENT
Start: 2024-10-15 | End: 2025-10-15

## 2024-10-15 RX ORDER — TRAZODONE HYDROCHLORIDE 50 MG/1
25-50 TABLET ORAL NIGHTLY
Qty: 30 TABLET | Refills: 11 | Status: SHIPPED | OUTPATIENT
Start: 2024-10-15 | End: 2025-10-15

## 2024-10-15 NOTE — PROGRESS NOTES
Palliative Medicine Clinic Note     Consult Requested By: Dr. Gabino Walters      Reason for Consult: Metastatic colon cancer    Chief Complaint: Establish care    ASSESSMENT/PLAN:      Plan/Recommendations:    Diagnoses and all orders for this visit:    Cancer cachexia  -     droNABinol (MARINOL) 5 MG capsule; Take 1 capsule (5 mg total) by mouth 2 (two) times daily before meals.    Malignant neoplasm of hepatic flexure  -     Ambulatory referral/consult to CLINIC Palliative Care  -     droNABinol (MARINOL) 5 MG capsule; Take 1 capsule (5 mg total) by mouth 2 (two) times daily before meals.  -     oxyCODONE-acetaminophen (PERCOCET)  mg per tablet; Take 1 tablet by mouth every 6 (six) hours as needed for Pain.  -     Ambulatory referral/consult to Interventional Radiology; Future  -     gabapentin (NEURONTIN) 600 MG tablet; Take 1 tablet (600 mg total) by mouth 3 (three) times daily.    Insomnia due to medical condition  -     traZODone (DESYREL) 50 MG tablet; Take ½ to 1 tablet by mouth every evening.    Intractable nausea and vomiting  -     OLANZapine zydis (ZYPREXA ZYDIS) 5 MG TbDL; Dissolve 1 tablet (5 mg total) by mouth 3 (three) times daily before meals for 7 days    Cancer related pain  -     oxyCODONE-acetaminophen (PERCOCET)  mg per tablet; Take 1 tablet by mouth every 6 (six) hours as needed for Pain.  -     Ambulatory referral/consult to Interventional Radiology; Future  -     gabapentin (NEURONTIN) 600 MG tablet; Take 1 tablet (600 mg total) by mouth 3 (three) times daily.    Advanced care planning/counseling discussion  -     Full code      Advance Care Planning   Advance Directives:   Living Will: No    [place]: No    Do Not Resuscitate Status: No    Medical Power of : Yes    Agent's Name:  Aaliyah Saxena (daughter)   Agent's Contact Number:  [phone]    Decision Making:  Patient answered questions and Family answered questions  Goals of Care: The patient endorses that  what is most important right now is to focus on spending time at home, avoiding the hospital, remaining as independent as possible, symptom/pain control, and curative/life-prolongation (regardless of treatment burdens)    Accordingly, we have decided that the best plan to meet the patient's goals includes continuing with treatment         Follow up: 1 month     Plan discussed with: Pt, daughter, sister       SUBJECTIVE:      History of Present Illness / Interval History:  Rosette Wood is 53 y.o. female with PMH of right sided colon CA s/p R hemicolectomy + ileostomy, diagnosed on ex-lap 8/6/24. Presents to Palliative Care Clinic for physical symptoms, advance care planning,, clarification of goals of care, and additional support.. Please see 9/24/24 onc note by Dr. Walters for more details on cancer directed treatment.    ---    Oncology Dx/Tx:  Pt first presented to [facility] ED on 6/17/24 for abdominal pain and diarrhea, occasional hematochezia. Non-con CT abdomen revealed a left liver mass.  Discharged to home with PCP f/u and was referred for diagnostic colonoscopy on 8/1/24 however did not attend due to severe weakness and vomiting. Recommended to present to ED and contrast CT abdomen/pelvis revealed enlargement of the left lobe liver mass and a new intussusception of the ascending colon with heterogenous colon wall thickening highly concerning for malignancy. Admitted for ex-lap by gen surg Dr. Saxena and underwent excisional biopsy with R hemicolectomy and ileostomy on 8/5/24. Path positive for adenoCA and intra-op liver biopsy confirmed the mass to be metastatic disease.    Pt had her initial onc visit on 9/24/24 and was informed that her disease is stage IV and incurable but appropriate for chemotherapy with palliative intent. Pt consented to start FOLFOX+bevacizumab. Port placed 9/27/24 by Dr. Saxena. PET is scheduled for 10/7/24.     Palliative mgmt:  Has been started on Percocet 10/325mg q8h prn by  Dr. Walters with cyproheptadine tid for appetite, Zofran and Phenergan PRN for n/v, Zyprexa qhs on days 1-3 of chemo cycles.     ---    10/15/24  History obtained from: patient and daughters    Pt presents independently ambulatory, underweight but without temporal wasting, accompanied by her daughter Radu; sister Vitaly; and young grandson Aubrey. She reports some recent frustration at miscommunications, scheduling issues, and insurance denial for PET. Per contemporaneous discussion with Dr. Walters, CT will likely suffice for interval imaging given known stage IV disease, and there is no immediate plan for ileostomy reversal so chemo should proceed ASAP.    Pt reports multiple cancer related symptoms warranting urgent optimization, most prominently severe LUQ pain and tenderness at the site of her known left hepatic lobe met, which has been surveilled on CT since July and is known to be steadily enlarging. Now visible and palpable on exam with disproportionate tenderness and allodynia; pt was brought to tears with light palpation. She reports paroxysms of pain associated with oral intake, coughing, sneezing, vomiting, etc. that are tolerably controlled by Percocet for about 4 hours but pt will run out of her current rx in the next 2-3 days. Symptoms are worst at night and no position relieves the pain. She states she already takes gabapentin 600mg bid for knee pain and ran out yesterday. She reports a tolerance for marijuana from prior use.    Pt reports dysgeusia and worsening intolerance of spicy foods, reporting she vomited all weekend after a Chinese meal. Had adequate ostomy output throughout. She reports Zofran and Phenergan improve nausea about 50% of the time but appetite has not improved all and she has vomited up her cyproheptadine and pain meds on multiple occasions.    Requests an introductory sleep aid.    For cancer related visceral pains, will pursue aggressive multimodal mgmt: increase Percocet  10/325 from q8h -> q6h prn, increase home gabapentin from 600mg bid -> 600mg tid, and refer to IR for celiac block.    For n/v and anorexia, start dronabinol 5mg bid, and dissolving olanzapine tabs 5mg tid ac.    For sleep, start trazodone 25-50mg qhs.    ACP: has a single daughter, Aaliyah, now listed as JOHANNA.    ROS:  Review of Systems   Constitutional:  Positive for appetite change, fatigue and unexpected weight change. Negative for activity change.   HENT:  Negative for trouble swallowing and voice change.    Cardiovascular:  Positive for chest pain (pleuritic).   Gastrointestinal:  Positive for abdominal distention, abdominal pain, constipation, nausea and vomiting.   Genitourinary:  Negative for difficulty urinating.   Psychiatric/Behavioral:  Positive for sleep disturbance. Negative for depressed mood and dysphoric mood. The patient is nervous/anxious.        Review of Symptoms      Symptom Assessment (ESAS 0-10 Scale)  Pain:  0  Dyspnea:  0  Anxiety:  0  Nausea:  0  Depression:  0  Anorexia:  0  Fatigue:  0  Insomnia:  0  Restlessness:  0  Agitation:  0     CAM / Delirium:  Negative  Constipation:  Negative  Diarrhea:  Negative    Anxiety:  Is nervous/anxious  Constipation:  Constipation    ECOG Performance Status ndGndrndanddndend:nd nd2nd Psychosocial/Cultural:   See Palliative Psychosocial Note: No  Social Issues Identified: Coping deficit pt/family and New Diagnosis/Trauma  Bereavement Risk: Yes: Close or dependent relationship to the  person  Caregiver Needs Discussed. Caregiver Distress: Yes: Intensity of family caregiving  Cultural: Lives alone, support network resides in [facility].  **Primary  to Follow**  Palliative Care  Consult: No    Spiritual:  F - Salena and Belief:  Mandaeism  I - Importance:  Low  C - Community:  Home Orthodox  A - Address in Care:  Pastoral f/u PRN     Time-Based Charting:  Yes  Chart Review: 18 minutes  Face to Face: 21 minutes  Symptom Assessment: 12  minutes  Coordination of Care: 3 minutes  Advance Care Plannin minutes  Goals of Care: 10 minutes    Total Time Spent: 73 minutes            Medications:    Current Outpatient Medications:     acetaminophen (TYLENOL) 500 MG tablet, Take 1 tablet (500 mg total) by mouth every 6 (six) hours as needed for Pain., Disp: 20 tablet, Rfl: 0    amLODIPine (NORVASC) 10 MG tablet, Take 1 tablet (10 mg total) by mouth once daily., Disp: 90 tablet, Rfl: 3    cyproheptadine (PERIACTIN) 4 mg tablet, Take 1 tablet (4 mg total) by mouth 3 (three) times daily as needed (appetite stimulant)., Disp: 90 tablet, Rfl: 3    ferrous sulfate 325 (65 FE) MG EC tablet, Take 1 tablet (325 mg total) by mouth 3 (three) times daily with meals., Disp: 30 tablet, Rfl: 0    ibuprofen (ADVIL,MOTRIN) 800 MG tablet, Take 1 tablet (800 mg total) by mouth every evening., Disp: 20 tablet, Rfl: 0    naproxen (NAPROSYN) 500 MG tablet, Take 1 tablet (500 mg total) by mouth once daily., Disp: 30 tablet, Rfl: 0    OLANZapine (ZYPREXA) 5 MG tablet, Take 1 tablet by mouth nightly on days 1-3 of each chemotherapy cycle., Disp: 6 tablet, Rfl: 11    ondansetron (ZOFRAN-ODT) 8 MG TbDL, Take 1 tablet (8 mg total) by mouth every 8 (eight) hours as needed (Nausea)., Disp: 60 tablet, Rfl: 3    orphenadrine (NORFLEX) 100 mg tablet, Take 1 tablet (100 mg total) by mouth nightly as needed (muscle cramps)., Disp: 10 tablet, Rfl: 0    oxyCODONE-acetaminophen (PERCOCET)  mg per tablet, Take 1 tablet by mouth every 8 (eight) hours as needed for Pain., Disp: 90 tablet, Rfl: 0    promethazine (PHENERGAN) 25 MG tablet, Take 1 tablet (25 mg total) by mouth every 6 (six) hours as needed for Nausea., Disp: 60 tablet, Rfl: 0      External  database queried on 10/15/2024  by Josue DUEÑAS :    2024 1 Oxycodone-Acetaminophen  90.00 30 Th Atk [id number] [facility] ([id number]) 0 45.00 MME Comm Ins [place]   2024 1 Oxycodone-Acetaminophen  5-325 20.00 5 Ja Bra [id number] [facility] ([id number]) 0 30.00 MME Private Pay [place]   08/13/2024 08/13/2024 1 Oxycodone-Acetaminophen 5-325 12.00 3 Oc Ph [id number] [facility] ([id number]) 0 30.00 MME Comm Ins [place]   06/28/2024 06/28/2024 1 Acetaminophen-Cod #3 Tablet 20.00 5 An Le, [id number] Wal ([id number]) 0 18.00 MME Medicaid [place]   11/09/2023 11/06/2023 1 Hydrocodone-Acetamin 5-325 Mg 21.00 3 Er Alex [id number] Wal ([id number]) 0 35.00 MME Medicaid [place]       Review of patient's allergies indicates:  No Known Allergies        OBJECTIVE:         Physical Exam:  Vitals: Pulse: (!) 116 (10/15/24 0920)  BP: (!) 168/115 (10/15/24 0920)  SpO2: 98 % (10/15/24 0920)    Physical Exam  Constitutional:       Appearance: Normal appearance. She is well-developed, well-groomed and underweight. She is not ill-appearing, toxic-appearing or diaphoretic.   HENT:      Head: Normocephalic and atraumatic.      Mouth/Throat:      Mouth: Mucous membranes are moist.      Pharynx: Oropharynx is clear.   Eyes:      General: No scleral icterus.     Extraocular Movements: Extraocular movements intact.      Pupils: Pupils are equal, round, and reactive to light.   Cardiovascular:      Rate and Rhythm: Normal rate and regular rhythm.      Pulses: Normal pulses.      Heart sounds: Normal heart sounds. No murmur heard.  Pulmonary:      Effort: Pulmonary effort is normal.      Breath sounds: Normal breath sounds.   Abdominal:      General: Abdomen is flat. Bowel sounds are normal. There is distension.      Palpations: Abdomen is rigid. There is mass (LUQ).      Tenderness: There is abdominal tenderness (LUQ) in the right upper quadrant, epigastric area and left upper quadrant. There is no guarding or rebound.          Comments: Laparotomy scar well healed  Firm, palpable, visible abdominal mass at LUQ, markedly tender to palpation with allodynia to light touch   Musculoskeletal:      Cervical back: Normal range of motion and neck supple.   Neurological:      Mental Status: She is alert.        Labs: CEA wnl    Imaging: None new     I spent a total of 54 minutes on the day of the visit. This includes face to face time in discussion of goals of care, symptom assessment, coordination of care and emotional support.  This also includes non-face to face time preparing to see the patient (eg, review of tests/imaging), obtaining and/or reviewing separately obtained history, documenting clinical information in the electronic or other health record, independently interpreting results and communicating results to the patient/family/caregiver, or care coordinator.     Additional 19 min time spent on a voluntary advance care planning and /or goals of care discussion, providing emotional support, formulating and communicating prognosis and exploring burden/benefit of various approaches of treatment.       Josue Joy Jr, MD

## 2024-10-16 ENCOUNTER — TELEPHONE (OUTPATIENT)
Dept: INFUSION THERAPY | Facility: HOSPITAL | Age: 54
End: 2024-10-16
Payer: MEDICAID

## 2024-10-16 DIAGNOSIS — C77.8 MALIGNANT NEOPLASM METASTATIC TO LYMPH NODES OF MULTIPLE SITES: Primary | ICD-10-CM

## 2024-10-21 ENCOUNTER — PATIENT MESSAGE (OUTPATIENT)
Dept: HEMATOLOGY/ONCOLOGY | Facility: CLINIC | Age: 54
End: 2024-10-21
Payer: MEDICAID

## 2024-10-22 ENCOUNTER — LAB VISIT (OUTPATIENT)
Dept: LAB | Facility: HOSPITAL | Age: 54
End: 2024-10-22
Attending: INTERNAL MEDICINE
Payer: MEDICAID

## 2024-10-22 ENCOUNTER — OFFICE VISIT (OUTPATIENT)
Dept: HEMATOLOGY/ONCOLOGY | Facility: CLINIC | Age: 54
End: 2024-10-22
Payer: MEDICAID

## 2024-10-22 VITALS
OXYGEN SATURATION: 95 % | SYSTOLIC BLOOD PRESSURE: 121 MMHG | RESPIRATION RATE: 17 BRPM | DIASTOLIC BLOOD PRESSURE: 87 MMHG | BODY MASS INDEX: 18.48 KG/M2 | HEIGHT: 61 IN | HEART RATE: 102 BPM | WEIGHT: 97.88 LBS

## 2024-10-22 DIAGNOSIS — C18.3 MALIGNANT NEOPLASM OF HEPATIC FLEXURE: Primary | ICD-10-CM

## 2024-10-22 DIAGNOSIS — I10 PRIMARY HYPERTENSION: ICD-10-CM

## 2024-10-22 DIAGNOSIS — C77.8 MALIGNANT NEOPLASM METASTATIC TO LYMPH NODES OF MULTIPLE SITES: ICD-10-CM

## 2024-10-22 DIAGNOSIS — T45.1X5A CINV (CHEMOTHERAPY-INDUCED NAUSEA AND VOMITING): ICD-10-CM

## 2024-10-22 DIAGNOSIS — R11.2 CINV (CHEMOTHERAPY-INDUCED NAUSEA AND VOMITING): ICD-10-CM

## 2024-10-22 DIAGNOSIS — C18.3 MALIGNANT NEOPLASM OF HEPATIC FLEXURE: ICD-10-CM

## 2024-10-22 DIAGNOSIS — G89.3 CANCER RELATED PAIN: ICD-10-CM

## 2024-10-22 LAB
ALBUMIN SERPL BCP-MCNC: 2.9 G/DL (ref 3.5–5.2)
ALP SERPL-CCNC: 238 U/L (ref 40–150)
ALT SERPL W/O P-5'-P-CCNC: 5 U/L (ref 10–44)
ANION GAP SERPL CALC-SCNC: 14 MMOL/L (ref 8–16)
AST SERPL-CCNC: 11 U/L (ref 10–40)
BACTERIA #/AREA URNS HPF: ABNORMAL /HPF
BASOPHILS # BLD AUTO: 0.08 K/UL (ref 0–0.2)
BASOPHILS NFR BLD: 0.4 % (ref 0–1.9)
BILIRUB SERPL-MCNC: 0.4 MG/DL (ref 0.1–1)
BILIRUB UR QL STRIP: NEGATIVE
BUN SERPL-MCNC: 20 MG/DL (ref 6–20)
CALCIUM SERPL-MCNC: 11.3 MG/DL (ref 8.7–10.5)
CEA SERPL-MCNC: <1.7 NG/ML (ref 0–5)
CHLORIDE SERPL-SCNC: 95 MMOL/L (ref 95–110)
CLARITY UR: ABNORMAL
CO2 SERPL-SCNC: 21 MMOL/L (ref 23–29)
COLOR UR: YELLOW
CREAT SERPL-MCNC: 0.7 MG/DL (ref 0.5–1.4)
DIFFERENTIAL METHOD BLD: ABNORMAL
EOSINOPHIL # BLD AUTO: 0 K/UL (ref 0–0.5)
EOSINOPHIL NFR BLD: 0 % (ref 0–8)
ERYTHROCYTE [DISTWIDTH] IN BLOOD BY AUTOMATED COUNT: 21.9 % (ref 11.5–14.5)
EST. GFR  (NO RACE VARIABLE): >60 ML/MIN/1.73 M^2
GLUCOSE SERPL-MCNC: 121 MG/DL (ref 70–110)
GLUCOSE UR QL STRIP: NEGATIVE
HCG INTACT+B SERPL-ACNC: 1.9 MIU/ML
HCT VFR BLD AUTO: 32.5 % (ref 37–48.5)
HGB BLD-MCNC: 10 G/DL (ref 12–16)
HGB UR QL STRIP: ABNORMAL
HYALINE CASTS #/AREA URNS LPF: 0 /LPF
IMM GRANULOCYTES # BLD AUTO: 0.12 K/UL (ref 0–0.04)
IMM GRANULOCYTES NFR BLD AUTO: 0.6 % (ref 0–0.5)
KETONES UR QL STRIP: ABNORMAL
LEUKOCYTE ESTERASE UR QL STRIP: ABNORMAL
LYMPHOCYTES # BLD AUTO: 1.4 K/UL (ref 1–4.8)
LYMPHOCYTES NFR BLD: 6.8 % (ref 18–48)
MAGNESIUM SERPL-MCNC: 2 MG/DL (ref 1.6–2.6)
MCH RBC QN AUTO: 18.8 PG (ref 27–31)
MCHC RBC AUTO-ENTMCNC: 30.8 G/DL (ref 32–36)
MCV RBC AUTO: 61 FL (ref 82–98)
MICROSCOPIC COMMENT: ABNORMAL
MONOCYTES # BLD AUTO: 1.3 K/UL (ref 0.3–1)
MONOCYTES NFR BLD: 6.4 % (ref 4–15)
NEUTROPHILS # BLD AUTO: 17.1 K/UL (ref 1.8–7.7)
NEUTROPHILS NFR BLD: 85.8 % (ref 38–73)
NITRITE UR QL STRIP: NEGATIVE
NRBC BLD-RTO: 0 /100 WBC
PH UR STRIP: 6 [PH] (ref 5–8)
PLATELET # BLD AUTO: 677 K/UL (ref 150–450)
PMV BLD AUTO: 10.1 FL (ref 9.2–12.9)
POTASSIUM SERPL-SCNC: 4.9 MMOL/L (ref 3.5–5.1)
PROT SERPL-MCNC: 7.8 G/DL (ref 6–8.4)
PROT UR QL STRIP: ABNORMAL
RBC # BLD AUTO: 5.32 M/UL (ref 4–5.4)
RBC #/AREA URNS HPF: 5 /HPF (ref 0–4)
SODIUM SERPL-SCNC: 130 MMOL/L (ref 136–145)
SP GR UR STRIP: 1.02 (ref 1–1.03)
SQUAMOUS #/AREA URNS HPF: 5 /HPF
URN SPEC COLLECT METH UR: ABNORMAL
UROBILINOGEN UR STRIP-ACNC: NEGATIVE EU/DL
WBC # BLD AUTO: 19.96 K/UL (ref 3.9–12.7)
WBC #/AREA URNS HPF: 20 /HPF (ref 0–5)

## 2024-10-22 PROCEDURE — 36415 COLL VENOUS BLD VENIPUNCTURE: CPT | Performed by: INTERNAL MEDICINE

## 2024-10-22 PROCEDURE — 83735 ASSAY OF MAGNESIUM: CPT | Performed by: INTERNAL MEDICINE

## 2024-10-22 PROCEDURE — 81000 URINALYSIS NONAUTO W/SCOPE: CPT | Performed by: INTERNAL MEDICINE

## 2024-10-22 PROCEDURE — 80053 COMPREHEN METABOLIC PANEL: CPT | Performed by: INTERNAL MEDICINE

## 2024-10-22 PROCEDURE — 84702 CHORIONIC GONADOTROPIN TEST: CPT | Performed by: INTERNAL MEDICINE

## 2024-10-22 PROCEDURE — 82378 CARCINOEMBRYONIC ANTIGEN: CPT | Performed by: INTERNAL MEDICINE

## 2024-10-22 PROCEDURE — 3008F BODY MASS INDEX DOCD: CPT | Mod: CPTII,,, | Performed by: INTERNAL MEDICINE

## 2024-10-22 PROCEDURE — 99999 PR PBB SHADOW E&M-EST. PATIENT-LVL III: CPT | Mod: PBBFAC,,, | Performed by: INTERNAL MEDICINE

## 2024-10-22 PROCEDURE — 99213 OFFICE O/P EST LOW 20 MIN: CPT | Mod: PBBFAC,PO | Performed by: INTERNAL MEDICINE

## 2024-10-22 PROCEDURE — 1159F MED LIST DOCD IN RCRD: CPT | Mod: CPTII,,, | Performed by: INTERNAL MEDICINE

## 2024-10-22 PROCEDURE — 3079F DIAST BP 80-89 MM HG: CPT | Mod: CPTII,,, | Performed by: INTERNAL MEDICINE

## 2024-10-22 PROCEDURE — 3074F SYST BP LT 130 MM HG: CPT | Mod: CPTII,,, | Performed by: INTERNAL MEDICINE

## 2024-10-22 PROCEDURE — 3044F HG A1C LEVEL LT 7.0%: CPT | Mod: CPTII,,, | Performed by: INTERNAL MEDICINE

## 2024-10-22 PROCEDURE — 99215 OFFICE O/P EST HI 40 MIN: CPT | Mod: S$PBB,,, | Performed by: INTERNAL MEDICINE

## 2024-10-22 PROCEDURE — 85025 COMPLETE CBC W/AUTO DIFF WBC: CPT | Performed by: INTERNAL MEDICINE

## 2024-10-22 RX ORDER — SODIUM CHLORIDE 0.9 % (FLUSH) 0.9 %
10 SYRINGE (ML) INJECTION
Status: CANCELLED | OUTPATIENT
Start: 2024-10-25

## 2024-10-22 RX ORDER — EPINEPHRINE 0.3 MG/.3ML
0.3 INJECTION SUBCUTANEOUS ONCE AS NEEDED
Status: CANCELLED | OUTPATIENT
Start: 2024-10-23

## 2024-10-22 RX ORDER — LIDOCAINE AND PRILOCAINE 25; 25 MG/G; MG/G
CREAM TOPICAL
Qty: 30 G | Refills: 3 | Status: SHIPPED | OUTPATIENT
Start: 2024-10-22

## 2024-10-22 RX ORDER — HEPARIN 100 UNIT/ML
500 SYRINGE INTRAVENOUS
Status: CANCELLED | OUTPATIENT
Start: 2024-10-23

## 2024-10-22 RX ORDER — DIPHENHYDRAMINE HYDROCHLORIDE 50 MG/ML
50 INJECTION INTRAMUSCULAR; INTRAVENOUS ONCE AS NEEDED
Status: CANCELLED | OUTPATIENT
Start: 2024-10-23

## 2024-10-22 RX ORDER — HEPARIN 100 UNIT/ML
500 SYRINGE INTRAVENOUS
Status: CANCELLED | OUTPATIENT
Start: 2024-10-25

## 2024-10-22 RX ORDER — PROCHLORPERAZINE EDISYLATE 5 MG/ML
10 INJECTION INTRAMUSCULAR; INTRAVENOUS ONCE AS NEEDED
Status: CANCELLED | OUTPATIENT
Start: 2024-10-23

## 2024-10-22 RX ORDER — PROCHLORPERAZINE EDISYLATE 5 MG/ML
10 INJECTION INTRAMUSCULAR; INTRAVENOUS ONCE AS NEEDED
Status: CANCELLED | OUTPATIENT
Start: 2024-10-25

## 2024-10-22 RX ORDER — FLUOROURACIL 50 MG/ML
400 INJECTION, SOLUTION INTRAVENOUS
Status: CANCELLED | OUTPATIENT
Start: 2024-10-23

## 2024-10-22 RX ORDER — SODIUM CHLORIDE 0.9 % (FLUSH) 0.9 %
10 SYRINGE (ML) INJECTION
Status: CANCELLED | OUTPATIENT
Start: 2024-10-23

## 2024-10-22 NOTE — PROGRESS NOTES
"PATIENT: Rosette Wood  MRN: [id number]  DATE: 10/22/2024      Subjective:     Chief complaint:  Chief Complaint   Patient presents with    Colon Cancer    Follow-up       Oncologic History:  54yo woman went to ED August 5 with c/o fatigue and abdominal pain dating back to at least June. CT scans showed intussusception of the colon at the hepatic flexure. She was taken to the OR for ex-lap and underwent right hemicolectomy and liver biopsy both compatible with diagnosis of metastatic adenocarcinoma carcinoma.      Colon adenocarcinoma, MSI-S, KRAS WT  Pgx DPYD nl         Interval History: Ms. Wood returns for follow up. She has established care with palliative care and her pain is under better control. Appetite remains poor and energy not great. She is scheduled for C#1 FOLFOX+richard tomorrow.       Review of Systems   A comprehensive review of systems was performed; pertinent positives and negatives are noted in the HPI.        ECOG Performance Status:   ECOG SCORE    2 - Capable of all selfcare but unable to carry out any work activities, active > 50% of hours         Objective:      Vitals:   Vitals:    10/22/24 1357   BP: 121/87   BP Location: Right arm   Patient Position: Sitting   Pulse: 102   Resp: 17   SpO2: 95%   Weight: 44.4 kg (97 lb 14.2 oz)   Height: 5' 1" (1.549 m)     BMI: Body mass index is 18.5 kg/m².      Physical Exam:   Physical Exam  Vitals and nursing note reviewed.   Constitutional:       General: She is not in acute distress.     Appearance: Normal appearance. She is not toxic-appearing.   HENT:      Head: Normocephalic and atraumatic.   Eyes:      General: No scleral icterus.     Conjunctiva/sclera: Conjunctivae normal.   Pulmonary:      Effort: Pulmonary effort is normal. No respiratory distress.   Abdominal:      General: There is no distension.   Musculoskeletal:         General: No swelling or deformity.      Cervical back: Neck supple.   Skin:     Coloration: Skin is not jaundiced.      " Findings: No erythema.   Neurological:      General: No focal deficit present.      Mental Status: She is alert and oriented to person, place, and time.      Motor: No weakness.   Psychiatric:         Mood and Affect: Mood normal.         Behavior: Behavior normal.           Laboratory Data:  WBC   Date Value Ref Range Status   10/22/2024 19.96 (H) 3.90 - 12.70 K/uL Final     Hemoglobin   Date Value Ref Range Status   10/22/2024 10.0 (L) 12.0 - 16.0 g/dL Final     Hematocrit   Date Value Ref Range Status   10/22/2024 32.5 (L) 37.0 - 48.5 % Final     Platelets   Date Value Ref Range Status   10/22/2024 677 (H) 150 - 450 K/uL Final     Gran # (ANC)   Date Value Ref Range Status   10/22/2024 17.1 (H) 1.8 - 7.7 K/uL Final     Gran %   Date Value Ref Range Status   10/22/2024 85.8 (H) 38.0 - 73.0 % Final       Chemistry        Component Value Date/Time     (L) 10/22/2024 1332    K 4.9 10/22/2024 1332    CL 95 10/22/2024 1332    CO2 21 (L) 10/22/2024 1332    BUN 20 10/22/2024 1332    CREATININE 0.7 10/22/2024 1332     (H) 10/22/2024 1332        Component Value Date/Time    CALCIUM 11.3 (H) 10/22/2024 1332    ALKPHOS 238 (H) 10/22/2024 1332    AST 11 10/22/2024 1332    ALT 5 (L) 10/22/2024 1332    BILITOT 0.4 10/22/2024 1332              Assessment/Plan:     1. Malignant neoplasm of hepatic flexure    2. 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 CINV (chemotherapy-induced nausea and vomiting)    4. Cancer related pain      Stg IV NSCLC, MSI-S, KRAS WT  Needs pre-treatment staging and CEA  C#1 FOLFOX+richard tomorrow    Med and Orders:  Orders Placed This Encounter    CT Chest Abdomen Pelvis With IV Contrast (XPD) Routine Oral Contrast    LIDOcaine-prilocaine (EMLA) cream       Follow Up:  Follow up in about 2 weeks (around 11/5/2024).      Above care plan was discussed with patient and all questions were addressed to their expressed satisfaction.       Gabino Walters MD,  FACP  Hematology & Medical Oncology  [facility]         High Risk Conditions:    Patient has a condition that poses threat to life and bodily function: Stg IV CRC  Patient is currently on drug therapy requiring intensive monitoring for toxicity: FOLFOX+richard

## 2024-10-23 ENCOUNTER — TELEPHONE (OUTPATIENT)
Dept: HEMATOLOGY/ONCOLOGY | Facility: CLINIC | Age: 54
End: 2024-10-23
Payer: MEDICAID

## 2024-10-23 ENCOUNTER — PATIENT MESSAGE (OUTPATIENT)
Dept: HEMATOLOGY/ONCOLOGY | Facility: CLINIC | Age: 54
End: 2024-10-23
Payer: MEDICAID

## 2024-10-23 ENCOUNTER — INFUSION (OUTPATIENT)
Dept: INFUSION THERAPY | Facility: HOSPITAL | Age: 54
End: 2024-10-23
Attending: INTERNAL MEDICINE
Payer: MEDICAID

## 2024-10-23 VITALS
RESPIRATION RATE: 18 BRPM | HEIGHT: 61 IN | SYSTOLIC BLOOD PRESSURE: 128 MMHG | DIASTOLIC BLOOD PRESSURE: 76 MMHG | BODY MASS INDEX: 17.25 KG/M2 | TEMPERATURE: 98 F | WEIGHT: 91.38 LBS | HEART RATE: 110 BPM | OXYGEN SATURATION: 100 %

## 2024-10-23 DIAGNOSIS — C18.3 MALIGNANT NEOPLASM OF HEPATIC FLEXURE: ICD-10-CM

## 2024-10-23 DIAGNOSIS — R64 CANCER CACHEXIA: Primary | ICD-10-CM

## 2024-10-23 DIAGNOSIS — C77.8 MALIGNANT NEOPLASM METASTATIC TO LYMPH NODES OF MULTIPLE SITES: Primary | ICD-10-CM

## 2024-10-23 PROCEDURE — 96413 CHEMO IV INFUSION 1 HR: CPT

## 2024-10-23 PROCEDURE — 96417 CHEMO IV INFUS EACH ADDL SEQ: CPT

## 2024-10-23 PROCEDURE — 96367 TX/PROPH/DG ADDL SEQ IV INF: CPT

## 2024-10-23 PROCEDURE — 25000003 PHARM REV CODE 250: Performed by: INTERNAL MEDICINE

## 2024-10-23 PROCEDURE — 96368 THER/DIAG CONCURRENT INF: CPT

## 2024-10-23 PROCEDURE — 96416 CHEMO PROLONG INFUSE W/PUMP: CPT

## 2024-10-23 PROCEDURE — 96415 CHEMO IV INFUSION ADDL HR: CPT

## 2024-10-23 PROCEDURE — 63600175 PHARM REV CODE 636 W HCPCS: Mod: JZ,JG | Performed by: INTERNAL MEDICINE

## 2024-10-23 RX ORDER — HEPARIN 100 UNIT/ML
500 SYRINGE INTRAVENOUS
Status: DISCONTINUED | OUTPATIENT
Start: 2024-10-23 | End: 2024-10-23 | Stop reason: HOSPADM

## 2024-10-23 RX ORDER — EPINEPHRINE 0.3 MG/.3ML
0.3 INJECTION SUBCUTANEOUS ONCE AS NEEDED
Status: DISCONTINUED | OUTPATIENT
Start: 2024-10-23 | End: 2024-10-23 | Stop reason: HOSPADM

## 2024-10-23 RX ORDER — FLUOROURACIL 50 MG/ML
400 INJECTION, SOLUTION INTRAVENOUS
Status: COMPLETED | OUTPATIENT
Start: 2024-10-23 | End: 2024-10-23

## 2024-10-23 RX ORDER — PROCHLORPERAZINE EDISYLATE 5 MG/ML
10 INJECTION INTRAMUSCULAR; INTRAVENOUS ONCE AS NEEDED
Status: DISCONTINUED | OUTPATIENT
Start: 2024-10-23 | End: 2024-10-23 | Stop reason: HOSPADM

## 2024-10-23 RX ORDER — SODIUM CHLORIDE 0.9 % (FLUSH) 0.9 %
10 SYRINGE (ML) INJECTION
Status: DISCONTINUED | OUTPATIENT
Start: 2024-10-23 | End: 2024-10-23 | Stop reason: HOSPADM

## 2024-10-23 RX ORDER — CYPROHEPTADINE HYDROCHLORIDE 4 MG/1
4 TABLET ORAL
Qty: 90 TABLET | Refills: 2 | Status: SHIPPED | OUTPATIENT
Start: 2024-10-23 | End: 2025-01-21

## 2024-10-23 RX ORDER — DIPHENHYDRAMINE HYDROCHLORIDE 50 MG/ML
50 INJECTION INTRAMUSCULAR; INTRAVENOUS ONCE AS NEEDED
Status: DISCONTINUED | OUTPATIENT
Start: 2024-10-23 | End: 2024-10-23 | Stop reason: HOSPADM

## 2024-10-23 RX ADMIN — FLUOROURACIL 3360 MG: 50 INJECTION, SOLUTION INTRAVENOUS at 04:10

## 2024-10-23 RX ADMIN — BEVACIZUMAB-AWWB 230 MG: 100 INJECTION, SOLUTION INTRAVENOUS at 11:10

## 2024-10-23 RX ADMIN — FLUOROURACIL 560 MG: 50 INJECTION, SOLUTION INTRAVENOUS at 03:10

## 2024-10-23 RX ADMIN — DEXTROSE MONOHYDRATE: 5 INJECTION, SOLUTION INTRAVENOUS at 12:10

## 2024-10-23 RX ADMIN — ALTEPLASE 2 MG: 2.2 INJECTION, POWDER, LYOPHILIZED, FOR SOLUTION INTRAVENOUS at 01:10

## 2024-10-23 RX ADMIN — DEXAMETHASONE SODIUM PHOSPHATE 0.25 MG: 10 INJECTION, SOLUTION INTRAMUSCULAR; INTRAVENOUS at 12:10

## 2024-10-23 RX ADMIN — LEUCOVORIN CALCIUM 550 MG: 500 INJECTION, POWDER, LYOPHILIZED, FOR SOLUTION INTRAMUSCULAR; INTRAVENOUS at 01:10

## 2024-10-23 RX ADMIN — OXALIPLATIN 119 MG: 50 INJECTION, SOLUTION, CONCENTRATE INTRAVENOUS at 01:10

## 2024-10-23 RX ADMIN — SODIUM CHLORIDE: 9 INJECTION, SOLUTION INTRAVENOUS at 10:10

## 2024-10-23 NOTE — NURSING
Pt tolerated Cylcle 1 day 1 Tara + FOLFOX well. No adverse reaction noted. Pt education reinforced on chemo regimen, side effects, what to expect, and when to call Dr. Walters . Chairside education reviewed and given to pt, education reinforced on s/s to monitor and when to call MD. and Pt verbalized understanding. I reviewed pt calender w/ pt and understanding verbalized. PAC remains accessed with 5FU infusing at 2.2mL/hr over 46 hrs. Patent upon  discharge.

## 2024-10-24 ENCOUNTER — TELEPHONE (OUTPATIENT)
Dept: HEMATOLOGY/ONCOLOGY | Facility: CLINIC | Age: 54
End: 2024-10-24
Payer: MEDICAID

## 2024-10-24 NOTE — TELEPHONE ENCOUNTER
Staff contacted daughter number in regards to her mom being scheduled for a nutrition appointment

## 2024-10-25 ENCOUNTER — INFUSION (OUTPATIENT)
Dept: INFUSION THERAPY | Facility: HOSPITAL | Age: 54
End: 2024-10-25
Attending: INTERNAL MEDICINE
Payer: MEDICAID

## 2024-10-25 ENCOUNTER — TELEPHONE (OUTPATIENT)
Dept: INFUSION THERAPY | Facility: HOSPITAL | Age: 54
End: 2024-10-25
Payer: MEDICAID

## 2024-10-25 VITALS
HEART RATE: 98 BPM | TEMPERATURE: 98 F | OXYGEN SATURATION: 94 % | RESPIRATION RATE: 18 BRPM | DIASTOLIC BLOOD PRESSURE: 84 MMHG | SYSTOLIC BLOOD PRESSURE: 118 MMHG

## 2024-10-25 DIAGNOSIS — C77.8 MALIGNANT NEOPLASM METASTATIC TO LYMPH NODES OF MULTIPLE SITES: Primary | ICD-10-CM

## 2024-10-25 PROCEDURE — 25000003 PHARM REV CODE 250: Performed by: INTERNAL MEDICINE

## 2024-10-25 PROCEDURE — A4216 STERILE WATER/SALINE, 10 ML: HCPCS | Performed by: INTERNAL MEDICINE

## 2024-10-25 PROCEDURE — 63600175 PHARM REV CODE 636 W HCPCS: Performed by: INTERNAL MEDICINE

## 2024-10-25 RX ORDER — HEPARIN 100 UNIT/ML
500 SYRINGE INTRAVENOUS
Status: DISCONTINUED | OUTPATIENT
Start: 2024-10-25 | End: 2024-10-25 | Stop reason: HOSPADM

## 2024-10-25 RX ORDER — SODIUM CHLORIDE 0.9 % (FLUSH) 0.9 %
10 SYRINGE (ML) INJECTION
Status: DISCONTINUED | OUTPATIENT
Start: 2024-10-25 | End: 2024-10-25 | Stop reason: HOSPADM

## 2024-10-25 RX ORDER — PROCHLORPERAZINE EDISYLATE 5 MG/ML
10 INJECTION INTRAMUSCULAR; INTRAVENOUS ONCE AS NEEDED
Status: DISCONTINUED | OUTPATIENT
Start: 2024-10-25 | End: 2024-10-25 | Stop reason: HOSPADM

## 2024-10-25 RX ADMIN — HEPARIN 500 UNITS: 100 SYRINGE at 04:10

## 2024-10-25 RX ADMIN — Medication 10 ML: at 04:10

## 2024-10-25 NOTE — TELEPHONE ENCOUNTER
"Multiple attempts made to contact pt about missed apt. "Phone not accepting calls" "Phone not in service" "phone unavailable" "mailbox full, unable to leave message". 4th phone call made to pt daughter Aaliyah. Daughter states she did not think her mom had an apt today, daughter provided additional number [phone].    1st attempt to call, no answer and left VM to return call..      2 nd call pt answered, Pt sounded like she just woke up and states she was unaware of 2:30 pt. Pt requesting to reschedule for Monday. Pt adavised that she needs to come in to remove chemo pump and flush/deacess port.  Pt states, "give me minute and hung up the phone"   "

## 2024-10-28 ENCOUNTER — TELEPHONE (OUTPATIENT)
Dept: HEMATOLOGY/ONCOLOGY | Facility: CLINIC | Age: 54
End: 2024-10-28
Payer: MEDICAID

## (undated) DEVICE — MANIFOLD 4 PORT

## (undated) DEVICE — COVER OVERHEAD SURG LT BLUE

## (undated) DEVICE — STAPLER SKIN ROTATING HEAD

## (undated) DEVICE — Device

## (undated) DEVICE — PACK SURGERY START

## (undated) DEVICE — TRAY FOLEY 16FR INFECTION CONT

## (undated) DEVICE — SUT 1 48IN PDS II VIO MONO

## (undated) DEVICE — SPONGE LAP 18X18 PREWASHED

## (undated) DEVICE — SPONGE COTTON TRAY 4X4IN

## (undated) DEVICE — RELOAD PROXIMATE CUT BLUE 75MM

## (undated) DEVICE — CUTTER PROXIMATE BLUE 75MM

## (undated) DEVICE — GOWN POLY REINF BRTH SLV LG

## (undated) DEVICE — SUT VICRYL 4-0 27 SH

## (undated) DEVICE — BLADE SURG CARBON STEEL SZ11

## (undated) DEVICE — CONTAINERS 32OZ

## (undated) DEVICE — DRESSING AQUACEL SACRAL 9 X 9

## (undated) DEVICE — ELECTRODE REM PLYHSV RETURN 9

## (undated) DEVICE — GAUZE WOVEN STRL 8PLY 2X2IN

## (undated) DEVICE — DRAPE T TRNSVRS LAP 102X78X121

## (undated) DEVICE — DRESSING TEGADERM 2 3/8 X 2.75

## (undated) DEVICE — TOWEL OR XRAY BLUE 17X26IN

## (undated) DEVICE — DRESSING TRANS 4X4 3/4

## (undated) DEVICE — DRAPE FLUID WARMER ORS 44X44IN

## (undated) DEVICE — APPLICATOR CHLORAPREP ORN 26ML

## (undated) DEVICE — GLOVE SURGICAL LATEX SZ 7

## (undated) DEVICE — SYR 10CC LUER LOCK

## (undated) DEVICE — DRAPE LAP T SHT W/ INSTR PAD

## (undated) DEVICE — ADHESIVE DERMABOND ADVANCED

## (undated) DEVICE — COVER PROBE 6X48

## (undated) DEVICE — GOWN POLY REINF BRTH SLV XL

## (undated) DEVICE — DRESSING XEROFORM NONADH 1X8IN

## (undated) DEVICE — SEALER LIGASURE IMPACT 18CM

## (undated) DEVICE — PACK ECLIPSE BASIC III SURG

## (undated) DEVICE — DECANTER FLUID TRNSF WHITE 9IN

## (undated) DEVICE — SUT MONOCRYL 5-0 P-3 UND 18

## (undated) DEVICE — SUT 2/0 30IN SILK BLK BRAI

## (undated) DEVICE — SUT SILK 0 STRANDS 30IN BLK

## (undated) DEVICE — SYR 30CC LUER LOCK

## (undated) DEVICE — SUPPORT ULNA NERVE PROTECTOR

## (undated) DEVICE — DRAPE C-ARM/MOBILE XRAY 44X80

## (undated) DEVICE — ELECTRODE EXTENDED BLADE

## (undated) DEVICE — SUT SILK 2-0 STRANDS 30IN

## (undated) DEVICE — NDL HYPO REG 25G X 1 1/2

## (undated) DEVICE — SUT CTD VICRYL 3-0 CR/SH